# Patient Record
Sex: MALE | Race: WHITE | Employment: OTHER | URBAN - METROPOLITAN AREA
[De-identification: names, ages, dates, MRNs, and addresses within clinical notes are randomized per-mention and may not be internally consistent; named-entity substitution may affect disease eponyms.]

---

## 2017-08-02 ENCOUNTER — TELEPHONE (OUTPATIENT)
Dept: CARDIOLOGY CLINIC | Age: 67
End: 2017-08-02

## 2017-08-04 ENCOUNTER — OFFICE VISIT (OUTPATIENT)
Dept: CARDIOLOGY CLINIC | Age: 67
End: 2017-08-04

## 2017-08-04 VITALS
OXYGEN SATURATION: 92 % | BODY MASS INDEX: 42.66 KG/M2 | HEART RATE: 82 BPM | HEIGHT: 72 IN | WEIGHT: 315 LBS | DIASTOLIC BLOOD PRESSURE: 60 MMHG | SYSTOLIC BLOOD PRESSURE: 120 MMHG

## 2017-08-04 DIAGNOSIS — I50.9 CHF (CONGESTIVE HEART FAILURE), NYHA CLASS IV, UNSPECIFIED FAILURE CHRONICITY, UNSPECIFIED TYPE (HCC): ICD-10-CM

## 2017-08-04 DIAGNOSIS — I10 ESSENTIAL HYPERTENSION: Primary | ICD-10-CM

## 2017-08-04 DIAGNOSIS — Z95.1 S/P CABG X 2: ICD-10-CM

## 2017-08-04 DIAGNOSIS — E78.2 MIXED HYPERLIPIDEMIA: ICD-10-CM

## 2017-08-04 DIAGNOSIS — I25.10 CORONARY ARTERY DISEASE INVOLVING NATIVE CORONARY ARTERY OF NATIVE HEART WITHOUT ANGINA PECTORIS: ICD-10-CM

## 2017-08-04 PROCEDURE — 99214 OFFICE O/P EST MOD 30 MIN: CPT | Performed by: INTERNAL MEDICINE

## 2017-08-04 PROCEDURE — G8598 ASA/ANTIPLAT THER USED: HCPCS | Performed by: INTERNAL MEDICINE

## 2017-08-04 PROCEDURE — 3017F COLORECTAL CA SCREEN DOC REV: CPT | Performed by: INTERNAL MEDICINE

## 2017-08-04 PROCEDURE — G8427 DOCREV CUR MEDS BY ELIG CLIN: HCPCS | Performed by: INTERNAL MEDICINE

## 2017-08-04 PROCEDURE — 1036F TOBACCO NON-USER: CPT | Performed by: INTERNAL MEDICINE

## 2017-08-04 PROCEDURE — 4040F PNEUMOC VAC/ADMIN/RCVD: CPT | Performed by: INTERNAL MEDICINE

## 2017-08-04 PROCEDURE — G8417 CALC BMI ABV UP PARAM F/U: HCPCS | Performed by: INTERNAL MEDICINE

## 2017-08-04 PROCEDURE — 1123F ACP DISCUSS/DSCN MKR DOCD: CPT | Performed by: INTERNAL MEDICINE

## 2017-08-04 RX ORDER — PANTOPRAZOLE SODIUM 40 MG/1
40 GRANULE, DELAYED RELEASE ORAL
COMMUNITY

## 2017-08-04 RX ORDER — ACETAMINOPHEN 160 MG
TABLET,DISINTEGRATING ORAL
COMMUNITY
End: 2018-10-18

## 2017-08-04 RX ORDER — ASCORBIC ACID 500 MG
1000 TABLET ORAL DAILY
COMMUNITY

## 2017-08-04 RX ORDER — RAMIPRIL 1.25 MG/1
1.25 CAPSULE ORAL DAILY
COMMUNITY

## 2017-08-04 RX ORDER — ATORVASTATIN CALCIUM 40 MG/1
40 TABLET, FILM COATED ORAL DAILY
Qty: 30 TABLET | Refills: 3 | Status: SHIPPED | OUTPATIENT
Start: 2017-08-04

## 2017-08-04 RX ORDER — CLOPIDOGREL BISULFATE 75 MG/1
75 TABLET ORAL DAILY
COMMUNITY

## 2017-08-04 RX ORDER — PIOGLITAZONEHYDROCHLORIDE 30 MG/1
30 TABLET ORAL DAILY
COMMUNITY
End: 2019-05-09 | Stop reason: CLARIF

## 2017-08-22 ENCOUNTER — HOSPITAL ENCOUNTER (OUTPATIENT)
Dept: NON INVASIVE DIAGNOSTICS | Age: 67
Discharge: HOME | End: 2017-08-22
Attending: FAMILY MEDICINE | Admitting: FAMILY MEDICINE

## 2017-08-22 DIAGNOSIS — R06.09 OTHER DYSPNEA AND RESPIRATORY ABNORMALITY: ICD-10-CM

## 2017-08-22 DIAGNOSIS — R09.89 OTHER DYSPNEA AND RESPIRATORY ABNORMALITY: ICD-10-CM

## 2017-08-22 LAB
A/G RATIO: 1.1 (ref 1.1–2.2)
ALBUMIN SERPL-MCNC: 3.8 G/DL (ref 3.4–5)
ALP BLD-CCNC: 64 U/L (ref 40–129)
ALT SERPL-CCNC: 15 U/L (ref 10–40)
ANION GAP SERPL CALCULATED.3IONS-SCNC: 9 MMOL/L (ref 3–16)
AST SERPL-CCNC: 13 U/L (ref 15–37)
BILIRUB SERPL-MCNC: 0.4 MG/DL (ref 0–1)
BUN BLDV-MCNC: 17 MG/DL (ref 7–20)
CALCIUM SERPL-MCNC: 8.7 MG/DL (ref 8.3–10.6)
CHLORIDE BLD-SCNC: 94 MMOL/L (ref 99–110)
CHOLESTEROL, FASTING: 107 MG/DL (ref 0–199)
CO2: 37 MMOL/L (ref 21–32)
CREAT SERPL-MCNC: 1.2 MG/DL (ref 0.8–1.3)
GFR AFRICAN AMERICAN: >60
GFR NON-AFRICAN AMERICAN: >60
GLOBULIN: 3.6 G/DL
GLUCOSE FASTING: 143 MG/DL (ref 70–99)
HDLC SERPL-MCNC: 34 MG/DL (ref 40–60)
LDL CHOLESTEROL CALCULATED: 47 MG/DL
LV EF: 40 %
LVEF MODALITY: NORMAL
POTASSIUM SERPL-SCNC: 4.5 MMOL/L (ref 3.5–5.1)
SODIUM BLD-SCNC: 140 MMOL/L (ref 136–145)
TOTAL PROTEIN: 7.4 G/DL (ref 6.4–8.2)
TRIGLYCERIDE, FASTING: 129 MG/DL (ref 0–150)
VLDLC SERPL CALC-MCNC: 26 MG/DL

## 2017-08-23 ENCOUNTER — TELEPHONE (OUTPATIENT)
Dept: CARDIOLOGY CLINIC | Age: 67
End: 2017-08-23

## 2017-11-03 ENCOUNTER — OFFICE VISIT (OUTPATIENT)
Dept: CARDIOLOGY CLINIC | Age: 67
End: 2017-11-03

## 2017-11-03 VITALS
OXYGEN SATURATION: 90 % | SYSTOLIC BLOOD PRESSURE: 120 MMHG | WEIGHT: 315 LBS | DIASTOLIC BLOOD PRESSURE: 64 MMHG | HEART RATE: 82 BPM | HEIGHT: 72 IN | BODY MASS INDEX: 42.66 KG/M2

## 2017-11-03 DIAGNOSIS — Z95.1 S/P CABG X 2: ICD-10-CM

## 2017-11-03 DIAGNOSIS — E78.2 MIXED HYPERLIPIDEMIA: ICD-10-CM

## 2017-11-03 DIAGNOSIS — J44.9 CHRONIC OBSTRUCTIVE PULMONARY DISEASE, UNSPECIFIED COPD TYPE (HCC): ICD-10-CM

## 2017-11-03 DIAGNOSIS — G47.30 SLEEP APNEA, UNSPECIFIED TYPE: ICD-10-CM

## 2017-11-03 DIAGNOSIS — I10 ESSENTIAL HYPERTENSION: Primary | ICD-10-CM

## 2017-11-03 PROCEDURE — 4040F PNEUMOC VAC/ADMIN/RCVD: CPT | Performed by: INTERNAL MEDICINE

## 2017-11-03 PROCEDURE — G8598 ASA/ANTIPLAT THER USED: HCPCS | Performed by: INTERNAL MEDICINE

## 2017-11-03 PROCEDURE — 3023F SPIROM DOC REV: CPT | Performed by: INTERNAL MEDICINE

## 2017-11-03 PROCEDURE — 99214 OFFICE O/P EST MOD 30 MIN: CPT | Performed by: INTERNAL MEDICINE

## 2017-11-03 PROCEDURE — 1123F ACP DISCUSS/DSCN MKR DOCD: CPT | Performed by: INTERNAL MEDICINE

## 2017-11-03 PROCEDURE — G8926 SPIRO NO PERF OR DOC: HCPCS | Performed by: INTERNAL MEDICINE

## 2017-11-03 PROCEDURE — G8484 FLU IMMUNIZE NO ADMIN: HCPCS | Performed by: INTERNAL MEDICINE

## 2017-11-03 PROCEDURE — G8427 DOCREV CUR MEDS BY ELIG CLIN: HCPCS | Performed by: INTERNAL MEDICINE

## 2017-11-03 PROCEDURE — G8417 CALC BMI ABV UP PARAM F/U: HCPCS | Performed by: INTERNAL MEDICINE

## 2017-11-03 PROCEDURE — 1036F TOBACCO NON-USER: CPT | Performed by: INTERNAL MEDICINE

## 2017-11-03 PROCEDURE — 3017F COLORECTAL CA SCREEN DOC REV: CPT | Performed by: INTERNAL MEDICINE

## 2017-11-03 RX ORDER — TORSEMIDE 20 MG/1
20 TABLET ORAL 2 TIMES DAILY
Qty: 30 TABLET | Refills: 0 | Status: SHIPPED
Start: 2017-11-03 | End: 2017-11-03 | Stop reason: DRUGHIGH

## 2017-11-03 RX ORDER — HYDROCODONE BITARTRATE AND ACETAMINOPHEN 10; 325 MG/1; MG/1
1 TABLET ORAL EVERY 6 HOURS PRN
COMMUNITY
End: 2019-05-09 | Stop reason: CLARIF

## 2017-11-03 RX ORDER — TORSEMIDE 20 MG/1
20 TABLET ORAL 2 TIMES DAILY
Qty: 30 TABLET | Refills: 0 | Status: SHIPPED
Start: 2017-11-03 | End: 2021-03-05 | Stop reason: SDUPTHER
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
## 2017-11-03 NOTE — PATIENT INSTRUCTIONS
Plan:  1. Continue taking all other medications as prescribed  2. Lipid, CMP - Fasting - in 9 months   2.  Follow up with me in 9 months
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
## 2018-02-20 ENCOUNTER — HOSPITAL ENCOUNTER (OUTPATIENT)
Dept: OTHER | Age: 68
Discharge: HOME | End: 2018-02-20
Attending: INTERNAL MEDICINE | Admitting: INTERNAL MEDICINE

## 2018-02-20 ENCOUNTER — OFFICE VISIT (OUTPATIENT)
Dept: PULMONOLOGY | Age: 68
End: 2018-02-20

## 2018-02-20 VITALS
SYSTOLIC BLOOD PRESSURE: 102 MMHG | TEMPERATURE: 98 F | RESPIRATION RATE: 16 BRPM | OXYGEN SATURATION: 91 % | HEIGHT: 72 IN | DIASTOLIC BLOOD PRESSURE: 52 MMHG | HEART RATE: 117 BPM

## 2018-02-20 DIAGNOSIS — I27.81 COR PULMONALE (HCC): ICD-10-CM

## 2018-02-20 DIAGNOSIS — G47.10 HYPERSOMNIA: ICD-10-CM

## 2018-02-20 DIAGNOSIS — J44.9 CHRONIC OBSTRUCTIVE PULMONARY DISEASE, UNSPECIFIED COPD TYPE (HCC): ICD-10-CM

## 2018-02-20 DIAGNOSIS — G47.33 OSA (OBSTRUCTIVE SLEEP APNEA): ICD-10-CM

## 2018-02-20 DIAGNOSIS — J44.9 CHRONIC OBSTRUCTIVE PULMONARY DISEASE, UNSPECIFIED COPD TYPE (HCC): Primary | ICD-10-CM

## 2018-02-20 DIAGNOSIS — J96.11 CHRONIC HYPOXEMIC RESPIRATORY FAILURE (HCC): ICD-10-CM

## 2018-02-20 PROCEDURE — 3017F COLORECTAL CA SCREEN DOC REV: CPT | Performed by: INTERNAL MEDICINE

## 2018-02-20 PROCEDURE — 4040F PNEUMOC VAC/ADMIN/RCVD: CPT | Performed by: INTERNAL MEDICINE

## 2018-02-20 PROCEDURE — 99244 OFF/OP CNSLTJ NEW/EST MOD 40: CPT | Performed by: INTERNAL MEDICINE

## 2018-02-20 PROCEDURE — G8417 CALC BMI ABV UP PARAM F/U: HCPCS | Performed by: INTERNAL MEDICINE

## 2018-02-20 PROCEDURE — G8484 FLU IMMUNIZE NO ADMIN: HCPCS | Performed by: INTERNAL MEDICINE

## 2018-02-20 PROCEDURE — G8427 DOCREV CUR MEDS BY ELIG CLIN: HCPCS | Performed by: INTERNAL MEDICINE

## 2018-02-20 PROCEDURE — G8926 SPIRO NO PERF OR DOC: HCPCS | Performed by: INTERNAL MEDICINE

## 2018-02-20 PROCEDURE — 3023F SPIROM DOC REV: CPT | Performed by: INTERNAL MEDICINE

## 2018-02-20 RX ORDER — PREDNISONE 10 MG/1
10 TABLET ORAL DAILY
COMMUNITY
End: 2019-05-09 | Stop reason: CLARIF

## 2018-02-20 RX ORDER — KETOCONAZOLE 20 MG/G
CREAM TOPICAL DAILY
COMMUNITY
End: 2018-10-18

## 2018-02-20 RX ORDER — INSULIN GLARGINE 100 [IU]/ML
40 INJECTION, SOLUTION SUBCUTANEOUS NIGHTLY
COMMUNITY
End: 2018-04-12 | Stop reason: ALTCHOICE

## 2018-02-20 RX ORDER — IPRATROPIUM BROMIDE AND ALBUTEROL SULFATE 2.5; .5 MG/3ML; MG/3ML
1 SOLUTION RESPIRATORY (INHALATION) EVERY 4 HOURS
Qty: 360 ML | Refills: 5 | Status: SHIPPED | OUTPATIENT
Start: 2018-02-20 | End: 2019-05-09

## 2018-02-20 RX ORDER — ASPIRIN 325 MG
325 TABLET ORAL DAILY
COMMUNITY
End: 2020-02-17

## 2018-02-20 RX ORDER — LEVOFLOXACIN 500 MG/1
500 TABLET, FILM COATED ORAL DAILY
COMMUNITY
End: 2019-05-09 | Stop reason: CLARIF

## 2018-02-20 ASSESSMENT — SLEEP AND FATIGUE QUESTIONNAIRES
HOW LIKELY ARE YOU TO NOD OFF OR FALL ASLEEP WHEN YOU ARE A PASSENGER IN A CAR FOR AN HOUR WITHOUT A BREAK: 2
HOW LIKELY ARE YOU TO NOD OFF OR FALL ASLEEP WHILE SITTING QUIETLY AFTER LUNCH WITHOUT ALCOHOL: 3
HOW LIKELY ARE YOU TO NOD OFF OR FALL ASLEEP WHILE SITTING AND TALKING TO SOMEONE: 0
HOW LIKELY ARE YOU TO NOD OFF OR FALL ASLEEP WHILE SITTING INACTIVE IN A PUBLIC PLACE: 0
HOW LIKELY ARE YOU TO NOD OFF OR FALL ASLEEP WHILE LYING DOWN TO REST IN THE AFTERNOON WHEN CIRCUMSTANCES PERMIT: 3
HOW LIKELY ARE YOU TO NOD OFF OR FALL ASLEEP WHILE WATCHING TV: 3
ESS TOTAL SCORE: 11
NECK CIRCUMFERENCE (INCHES): 22.75
HOW LIKELY ARE YOU TO NOD OFF OR FALL ASLEEP IN A CAR, WHILE STOPPED FOR A FEW MINUTES IN TRAFFIC: 0
HOW LIKELY ARE YOU TO NOD OFF OR FALL ASLEEP WHILE SITTING AND READING: 0

## 2018-02-20 NOTE — COMMUNICATION BODY
Assessment:       · Severe COPD  · Chronic hypoxemic respiratory failure  · Cor pulmonale  · Severe CHRISTOFER on BiPAP with 5LPM    · Hypersomnia   · History of Tracheostomy 10/2013  · CHF (EF 40%) CAD post CABG  · 1-2 ppd for 45 years- quit 2010        Plan:       · 6MW- Patient not able to perform PFTs  · CXR PA and lateral   · Symbicort 160/4.5 2 puffs BID   · DuoNeb QID PRN   · D/C Brovana   · Obtain old records for prior PSG/PAP titration. · 3L O2 rest and 5L exertion. Advised to titrate O2 using her pulse oximeter- target O2 sat 90-92%. · Advised to use BiPAP 6-8 hrs at night and during naps. · Replacement of mask, tubing, head straps every 3-6 months or sooner if damaged. · Follow up BIPAP compliance and pressure adjustment if needed  · Sleep hygiene  · Avoid sedatives, alcohol and caffeinated drinks at bed time. · No driving motorized vehicles or operating heavy machinery while fatigue, drowsy or sleepy. · Weight loss is also recommended as a long-term intervention. · Complications of CHRISTOFER if not treated were discussed with patient patient to include systemic hypertension, pulmonary hypertension, cardiovascular morbidities, car accidents and all cause mortality.

## 2018-02-20 NOTE — PATIENT INSTRUCTIONS
Please keep all of your future appointments scheduled by [facility] Rene JUAREZ [facility] office. Sleep Hygiene. .. Tips for better sleep. .. Avoid naps. This will ensure you are sleepy at bedtime. If you have to take a nap, sleep less than 1 hour, before 3 pm.  Sleep only when sleepy; this reduces the time you are awake in bed. Regular exercise is recommended to help you deepen your sleep, but not within 4-6 hours of your bedtime. Timing of exercise is important, aim to exercise early in the morning or early afternoon. A light snack may help you fall asleep. Warm milk and foods high in the amino acid tryptophan, such as bananas, may help you to sleep  Be sure to avoid heavy, spicy or sugary foods 4-6 hours before bedtime and avoid at snack time. Stay away from stimulants such as caffeine and nicotine for at least 4-6 hours before bed. Stimulants can interfere with your ability to fall asleep. Caffeine is found in tea, cola, coffee, cocoa and chocolate and is best avoided at bedtime. Nicotine is found in tobacco products. Avoid alcohol 4-6 hours before bedtime. Alcohol has an immediate sleep-inducing effect, after a few hours when alcohol levels fall there is a stimulant or wake-up effect and will cause fragmented sleep. Sleep rituals are important. Give your body clues it is time to slow down and sleep. Examples include; yoga, deep breathing, listen to relaxing music, a hot bath or a few minutes of reading. Have a fixed bedtime and awakening time, Even on weekends! You will feel better keeping a regular sleep cycle, even if you are retired or not working. Get into your favorite sleep position. If not asleep in 30 minutes, get up and do something boring until you feel sleepy. Remember not to expose yourself to bright lights such as TV, phone or tablet screens. Only use your bed for sleeping. Do not use your bed as an office, workroom or recreation room. Use comfortable bedding. Uncomfortable bedding can prevent good sleep. Ensure your bedroom is quiet and comfortable. A cooler room along with enough blankets to stay warm is recommended. If your room is too noisy, try a white noise machine. If too bright, try black out shades or an eye mask. Dont take worries to bed. Leave worries about work, school etc. behind you when you go to bed. Some people find it helpful to assign a worry period in the evening or late afternoon to write down your worries and get them out of your system.

## 2018-02-20 NOTE — PROGRESS NOTES
[facility]                                                                  CHIEF COMPLAINT: COPD and CHRISTOFER    Consulting provider: Jack Nava M.D.    HPI:   Patient with history of severe COPD and obstructive sleep apnea followed by Dr. VERDE [facility].  Last seen in 2015. Admitted 3 week ago 1/23 for PNA and COPD AE. He was discharged on BiPAP. Before was using CPAP on and off, not very compliant. Now very compliant with his BiPAP, uses it 5-7 hrs. Mask an pressure better than the CPAP. Goes to bed 2 am and wakes up 10 am. Sleep onset 60 min. Takes 1 nap/day for about 30-60 min. Snoring, witnessed apnea and hypersomnia if not using BiPAP. ESS 11     Patient with COPD uses Symbicort. Dyspnea on exertion, able to walk only 50 feet. Dyspnea worse with exertion and better with resting. Associated with cough and no sputum. No hemoptysis. Has been on O2 >4 years. Quit smoking 2010, smoked  Up to 2 ppd for 45 years       Past Medical History:   Diagnosis Date    CAD (coronary artery disease)     MI    CHF (congestive heart failure) (HCC)     Chronic kidney disease     COPD (chronic obstructive pulmonary disease) ([address]     Diabetes mellitus ([address]     DVT of leg (deep venous thrombosis) ([address]     Hyperlipidemia     Hypertension     MRSA infection within last 3 months     Pneumonia     Unspecified diseases of blood and blood-forming organs        Past Surgical History:        Procedure Laterality Date    ANKLE FRACTURE SURGERY Left 2/22/15    ORIF; LEFT ANKLE OPEN REDUCTION INTERNAL FIXATION    BACK SURGERY      CARDIAC SURGERY      2 VESSEL CABG    CORONARY ARTERY BYPASS GRAFT      DIAGNOSTIC CARDIAC CATH LAB PROCEDURE      FRACTURE SURGERY      LEG SURGERY      VEIN REMOVED FROM L  LEG  PLACED IN RT LEG    [facility]  [facility]       Allergies:  has No Known Allergies.   Social History:    TOBACCO:   reports that he quit smoking about 7 years ago. His smoking use included Cigarettes. He has a 80.00 pack-year smoking history. He quit smokeless tobacco use about 5 years ago. His smokeless tobacco use included Chew. ETOH:   reports that he does not drink alcohol. Family History:       Problem Relation Age of Onset   [facility] Cancer Mother     Heart Failure Father     Emphysema Sister        Current Medications:    Current Outpatient Prescriptions:     ketoconazole (NIZORAL) 2 % cream, Apply topically daily Apply topically daily. , Disp: , Rfl:     mupirocin (BACTROBAN) 2 % nasal ointment, by Nasal route 2 times daily Take by Nasal route 2 times daily. , Disp: , Rfl:     betamethasone valerate (VALISONE) 0.1 % cream, Apply topically 2 times daily Apply topically 2 times daily. , Disp: , Rfl:     aspirin 325 MG tablet, Take 325 mg by mouth daily, Disp: , Rfl:     levofloxacin (LEVAQUIN) 500 MG tablet, Take 500 mg by mouth daily, Disp: , Rfl:     predniSONE (DELTASONE) 10 MG tablet, Take 10 mg by mouth daily, Disp: , Rfl:     insulin glargine (LANTUS) 100 UNIT/ML injection vial, Inject 40 Units into the skin nightly, Disp: , Rfl:     Budesonide-Formoterol Fumarate (SYMBICORT IN), Inhale into the lungs, Disp: , Rfl:     torsemide (DEMADEX) 20 MG tablet, Take 1 tablet by mouth 2 times daily Take 3 tablets twice daily, Disp: 30 tablet, Rfl: 0    Ascorbic Acid (VITAMIN C) 500 MG tablet, Take 1,000 mg by mouth daily, Disp: , Rfl:     Cholecalciferol (VITAMIN D3) 2000 units CAPS, Take by mouth, Disp: , Rfl:     clopidogrel (PLAVIX) 75 MG tablet, Take 75 mg by mouth daily, Disp: , Rfl:     pantoprazole sodium (PROTONIX) 40 MG PACK packet, Take 40 mg by mouth 2 times daily (before meals), Disp: , Rfl:     ramipril (ALTACE) 1.25 MG capsule, Take 1.25 mg by mouth daily, Disp: , Rfl:     atorvastatin (LIPITOR) 40 MG tablet, Take 1 tablet by mouth daily, Disp: 30 tablet, Rfl: 3    ferrous sulfate 325 (65 FE) MG tablet, Take 325 mg by mouth daily (with breakfast), Disp: , Rfl:     docusate sodium (COLACE) 100 MG capsule, Take 100 mg by mouth daily, Disp: , Rfl:     spironolactone (ALDACTONE) 25 MG tablet, Take 25 mg by mouth 2 times daily, Disp: , Rfl:     Arformoterol Tartrate (BROVANA IN), Inhale into the lungs 2 times daily. , Disp: , Rfl:     oxyCODONE HCl ER 10 MG CR tablet, Take 1 tablet by mouth every 12 hours. , Disp: 4 tablet, Rfl: 0    glimepiride (AMARYL) 2 MG tablet, Take 1 tablet by mouth 2 times daily (with meals). (Patient taking differently: Take 2 mg by mouth 2 times daily ), Disp: 1 tablet, Rfl: 0    ipratropium-albuterol (DUONEB) 0.5-2.5 (3) MG/3ML SOLN nebulizer solution, Inhale 3 mLs into the lungs every 4 hours. , Disp: 360 mL, Rfl: 3    metoprolol (TOPROL-XL) 25 MG XL tablet, Take 25 mg by mouth daily. , Disp: , Rfl:     amitriptyline (ELAVIL) 25 MG tablet, 25 mg nightly., Disp: , Rfl:     gabapentin (NEURONTIN) 800 MG tablet, 800 mg 3 times daily. , Disp: , Rfl:     tamsulosin (FLOMAX) 0.4 MG capsule, Take 0.4 mg by mouth daily. , Disp: , Rfl:     albuterol (PROVENTIL HFA;VENTOLIN HFA) 108 (90 BASE) MCG/ACT inhaler, Inhale 2 puffs into the lungs every 6 hours as needed for Wheezing., Disp: , Rfl:     potassium chloride SA (K-DUR;KLOR-CON) 10 MEQ tablet, Take 10 mEq by mouth 2 times daily , Disp: , Rfl:     aspirin 81 MG tablet, Take 81 mg by mouth daily, Disp: , Rfl:     HYDROcodone-acetaminophen (NORCO)  MG per tablet, Take 1 tablet by mouth every 6 hours as needed for Pain ., Disp: , Rfl:     pioglitazone (ACTOS) 30 MG tablet, Take 30 mg by mouth daily, Disp: , Rfl:     omeprazole (PRILOSEC) 20 MG capsule, Take 20 mg by mouth 2 tabs daily, Disp: , Rfl:     BUDESONIDE IN, Inhale into the lungs 2 times daily. , Disp: , Rfl:     triamcinolone (KENALOG) 0.025 % cream, Apply topically every 4 hours as needed.  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   Mild concentric left ventricular hypertrophy.   No regional wall motion abnormalities are seen.   Grade I diastolic dysfunction with normal filling pressure. Assessment:       · Severe COPD  · Chronic hypoxemic respiratory failure  · Cor pulmonale  · Severe CHRISTOFER on BiPAP with 5LPM    · Hypersomnia   · History of Tracheostomy 10/2013  · CHF (EF 40%) CAD post CABG  · 1-2 ppd for 45 years- quit 2010        Plan:       · 6MW- Patient not able to perform PFTs  · CXR PA and lateral   · Symbicort 160/4.5 2 puffs BID   · Trial of DuoNeb QID PRN   · D/C Brovana   · Obtain old records for prior PSG/PAP titration. · 3L O2 rest and 5L exertion. Advised to titrate O2 using her pulse oximeter- target O2 sat 90-92%. · Advised to use BiPAP 6-8 hrs at night and during naps. · Replacement of mask, tubing, head straps every 3-6 months or sooner if damaged. · Follow up BIPAP compliance and pressure adjustment if needed  · Sleep hygiene  · Avoid sedatives, alcohol and caffeinated drinks at bed time. · No driving motorized vehicles or operating heavy machinery while fatigue, drowsy or sleepy. · Weight loss is also recommended as a long-term intervention. · Complications of CHRISTOFER if not treated were discussed with patient patient to include systemic hypertension, pulmonary hypertension, cardiovascular morbidities, car accidents and all cause mortality.

## 2018-03-01 ENCOUNTER — HOSPITAL ENCOUNTER (OUTPATIENT)
Dept: PULMONOLOGY | Age: 68
Discharge: HOME | End: 2018-03-01
Attending: INTERNAL MEDICINE | Admitting: INTERNAL MEDICINE

## 2018-03-01 ENCOUNTER — OFFICE VISIT (OUTPATIENT)
Dept: PULMONOLOGY | Age: 68
End: 2018-03-01

## 2018-03-01 VITALS
SYSTOLIC BLOOD PRESSURE: 106 MMHG | HEIGHT: 72 IN | WEIGHT: 315 LBS | DIASTOLIC BLOOD PRESSURE: 72 MMHG | HEART RATE: 94 BPM | TEMPERATURE: 98.6 F | BODY MASS INDEX: 42.66 KG/M2 | OXYGEN SATURATION: 95 % | RESPIRATION RATE: 20 BRPM

## 2018-03-01 DIAGNOSIS — J44.9 CHRONIC OBSTRUCTIVE PULMONARY DISEASE (HCC): ICD-10-CM

## 2018-03-01 DIAGNOSIS — G47.33 OSA (OBSTRUCTIVE SLEEP APNEA): ICD-10-CM

## 2018-03-01 DIAGNOSIS — R93.89 ABNORMAL CXR: Primary | ICD-10-CM

## 2018-03-01 DIAGNOSIS — J96.11 CHRONIC HYPOXEMIC RESPIRATORY FAILURE (HCC): ICD-10-CM

## 2018-03-01 DIAGNOSIS — J44.9 STAGE 3 SEVERE COPD BY GOLD CLASSIFICATION (HCC): ICD-10-CM

## 2018-03-01 PROCEDURE — 3023F SPIROM DOC REV: CPT | Performed by: INTERNAL MEDICINE

## 2018-03-01 PROCEDURE — 4040F PNEUMOC VAC/ADMIN/RCVD: CPT | Performed by: INTERNAL MEDICINE

## 2018-03-01 PROCEDURE — 3017F COLORECTAL CA SCREEN DOC REV: CPT | Performed by: INTERNAL MEDICINE

## 2018-03-01 PROCEDURE — G8926 SPIRO NO PERF OR DOC: HCPCS | Performed by: INTERNAL MEDICINE

## 2018-03-01 PROCEDURE — 99214 OFFICE O/P EST MOD 30 MIN: CPT | Performed by: INTERNAL MEDICINE

## 2018-03-01 PROCEDURE — G8598 ASA/ANTIPLAT THER USED: HCPCS | Performed by: INTERNAL MEDICINE

## 2018-03-01 PROCEDURE — 1123F ACP DISCUSS/DSCN MKR DOCD: CPT | Performed by: INTERNAL MEDICINE

## 2018-03-01 PROCEDURE — 1036F TOBACCO NON-USER: CPT | Performed by: INTERNAL MEDICINE

## 2018-03-01 PROCEDURE — G8417 CALC BMI ABV UP PARAM F/U: HCPCS | Performed by: INTERNAL MEDICINE

## 2018-03-01 PROCEDURE — G8427 DOCREV CUR MEDS BY ELIG CLIN: HCPCS | Performed by: INTERNAL MEDICINE

## 2018-03-01 PROCEDURE — G8484 FLU IMMUNIZE NO ADMIN: HCPCS | Performed by: INTERNAL MEDICINE

## 2018-03-01 RX ORDER — BUDESONIDE AND FORMOTEROL FUMARATE DIHYDRATE 160; 4.5 UG/1; UG/1
2 AEROSOL RESPIRATORY (INHALATION) 2 TIMES DAILY
Qty: 1 INHALER | Refills: 5 | Status: SHIPPED | OUTPATIENT
Start: 2018-03-01 | End: 2018-10-18 | Stop reason: SDUPTHER

## 2018-03-01 ASSESSMENT — SLEEP AND FATIGUE QUESTIONNAIRES
NECK CIRCUMFERENCE (INCHES): 22.25
HOW LIKELY ARE YOU TO NOD OFF OR FALL ASLEEP WHILE SITTING INACTIVE IN A PUBLIC PLACE: 1
ESS TOTAL SCORE: 14
HOW LIKELY ARE YOU TO NOD OFF OR FALL ASLEEP WHILE WATCHING TV: 3
HOW LIKELY ARE YOU TO NOD OFF OR FALL ASLEEP WHILE LYING DOWN TO REST IN THE AFTERNOON WHEN CIRCUMSTANCES PERMIT: 3
HOW LIKELY ARE YOU TO NOD OFF OR FALL ASLEEP WHILE SITTING QUIETLY AFTER LUNCH WITHOUT ALCOHOL: 2
HOW LIKELY ARE YOU TO NOD OFF OR FALL ASLEEP WHEN YOU ARE A PASSENGER IN A CAR FOR AN HOUR WITHOUT A BREAK: 1
HOW LIKELY ARE YOU TO NOD OFF OR FALL ASLEEP IN A CAR, WHILE STOPPED FOR A FEW MINUTES IN TRAFFIC: 1
HOW LIKELY ARE YOU TO NOD OFF OR FALL ASLEEP WHILE SITTING AND READING: 2
HOW LIKELY ARE YOU TO NOD OFF OR FALL ASLEEP WHILE SITTING AND TALKING TO SOMEONE: 1

## 2018-03-01 NOTE — PROGRESS NOTES
[facility] Pulmonary, Critical Care and Sleep Specialists                                                                  CHIEF COMPLAINT: COPD and CHRISTOFER        HPI:   6MW walked only 100 feet and require O2 3LPM. Occasional cough with clear sputum. Patient is compliant with inhaled bronchodilators and O2. Overall feels better from last week. Uses BiPAP every night. Goes to bed 2 am and wakes 10 am. Sleep onset of 60 min. 1 nap/day for about 60 min. From prior visit:   Patient with history of severe COPD and obstructive sleep apnea followed by Dr. VERDE [facility].  Last seen in 2015. Admitted 3 week ago 1/23 for PNA and COPD AE. He was discharged on BiPAP. Before was using CPAP on and off, not very compliant. Now very compliant with his BiPAP, uses it 5-7 hrs. Mask an pressure better than the CPAP. Goes to bed 2 am and wakes up 10 am. Sleep onset 60 min. Takes 1 nap/day for about 30-60 min. Snoring, witnessed apnea and hypersomnia if not using BiPAP. ESS 11     Patient with COPD uses Symbicort. Dyspnea on exertion, able to walk only 50 feet. Dyspnea worse with exertion and better with resting. Associated with cough and no sputum. No hemoptysis. Has been on O2 >4 years.  Quit smoking 2010, smoked  Up to 2 ppd for 45 years       Past Medical History:   Diagnosis Date    CAD (coronary artery disease)     MI    [facility]est[facility]     Chronic kidney disease     COPD [facility]ch[facility] obstructive pulmonary disease) [facility]     Diabetes mellitus ([facility] [address]     DVT of leg (deep venous thrombosis) ([facility]     Hyperlipidemia     Hypertension     MRSA infection within last 3 months     Pneumonia     Unspecified diseases of blood and blood-forming organs        Past Surgical History:        Procedure Laterality Date    ANKLE FRACTURE SURGERY Left 2/22/15    ORIF; LEFT ANKLE OPEN REDUCTION INTERNAL FIXATION    BACK SURGERY      CARDIAC SURGERY      2 VESSEL CABG    CORONARY ARTERY BYPASS GRAFT      DIAGNOSTIC CARDIAC CATH LAB PROCEDURE      FRACTURE SURGERY      LEG SURGERY      VEIN REMOVED FROM L  LEG  PLACED IN RT LEG    LEG SURGERY  2016 [address]       Allergies:  has No Known Allergies. Social History:    TOBACCO:   reports that he quit smoking about 7 years ago. His smoking use included Cigarettes. He has a 80.00 pack-year smoking history. He quit smokeless tobacco use about 5 years ago. His smokeless tobacco use included Chew. ETOH:   reports that he does not drink alcohol. Family History:       Problem Relation Age of Onset   Argelia Maldonado Cancer Mother     Heart Failure Father     Emphysema Sister        Current Medications:    Current Outpatient Prescriptions:     ketoconazole (NIZORAL) 2 % cream, Apply topically daily Apply topically daily. , Disp: , Rfl:     mupirocin (BACTROBAN) 2 % nasal ointment, by Nasal route 2 times daily Take by Nasal route 2 times daily. , Disp: , Rfl:     betamethasone valerate (VALISONE) 0.1 % cream, Apply topically 2 times daily Apply topically 2 times daily. , Disp: , Rfl:     aspirin 325 MG tablet, Take 325 mg by mouth daily, Disp: , Rfl:     levofloxacin (LEVAQUIN) 500 MG tablet, Take 500 mg by mouth daily, Disp: , Rfl:     predniSONE (DELTASONE) 10 MG tablet, Take 10 mg by mouth daily, Disp: , Rfl:     insulin glargine (LANTUS) 100 UNIT/ML injection vial, Inject 40 Units into the skin nightly, Disp: , Rfl:     ipratropium-albuterol (DUONEB) 0.5-2.5 (3) MG/3ML SOLN nebulizer solution, Inhale 3 mLs into the lungs every 4 hours, Disp: 360 mL, Rfl: 5    aspirin 81 MG tablet, Take 81 mg by mouth daily, Disp: , Rfl:     HYDROcodone-acetaminophen (NORCO)  MG per tablet, Take 1 tablet by mouth every 6 hours as needed for Pain ., Disp: , Rfl:     Budesonide-Formoterol Fumarate (SYMBICORT IN), Inhale into the lungs, Disp: , Rfl:     torsemide (DEMADEX) 20 MG tablet, Take 1 tablet by mouth 2 times daily Take 3 tablets twice daily, Disp: 30 tablet, Rfl: 0    Ascorbic Acid (VITAMIN C) 500 MG tablet, Take 1,000 mg by mouth daily, Disp: , Rfl:     Cholecalciferol (VITAMIN D3) 2000 units CAPS, Take by mouth, Disp: , Rfl:     clopidogrel (PLAVIX) 75 MG tablet, Take 75 mg by mouth daily, Disp: , Rfl:     pantoprazole sodium (PROTONIX) 40 MG PACK packet, Take 40 mg by mouth 2 times daily (before meals), Disp: , Rfl:     pioglitazone (ACTOS) 30 MG tablet, Take 30 mg by mouth daily, Disp: , Rfl:     ramipril (ALTACE) 1.25 MG capsule, Take 1.25 mg by mouth daily, Disp: , Rfl:     atorvastatin (LIPITOR) 40 MG tablet, Take 1 tablet by mouth daily, Disp: 30 tablet, Rfl: 3    ferrous sulfate 325 (65 FE) MG tablet, Take 325 mg by mouth daily (with breakfast), Disp: , Rfl:     docusate sodium (COLACE) 100 MG capsule, Take 100 mg by mouth daily, Disp: , Rfl:     omeprazole (PRILOSEC) 20 MG capsule, Take 20 mg by mouth 2 tabs daily, Disp: , Rfl:     spironolactone (ALDACTONE) 25 MG tablet, Take 25 mg by mouth 2 times daily, Disp: , Rfl:     BUDESONIDE IN, Inhale into the lungs 2 times daily. , Disp: , Rfl:     triamcinolone (KENALOG) 0.025 % cream, Apply topically every 4 hours as needed. Apply Topically, Disp: , Rfl:     oxyCODONE HCl ER 10 MG CR tablet, Take 1 tablet by mouth every 12 hours. , Disp: 4 tablet, Rfl: 0    glimepiride (AMARYL) 2 MG tablet, Take 1 tablet by mouth 2 times daily (with meals). (Patient taking differently: Take 2 mg by mouth 2 times daily ), Disp: 1 tablet, Rfl: 0    metoprolol (TOPROL-XL) 25 MG XL tablet, Take 25 mg by mouth daily. , Disp: , Rfl:     amitriptyline (ELAVIL) 25 MG tablet, 25 mg nightly., Disp: , Rfl:     gabapentin (NEURONTIN) 800 MG tablet, 800 mg 3 times daily. , Disp: , Rfl:     tamsulosin (FLOMAX) 0.4 MG capsule, Take 0.4 mg by mouth daily. , Disp: , Rfl:     albuterol (PROVENTIL HFA;VENTOLIN HFA) 108 (90 BASE) MCG/ACT inhaler, Inhale 2 puffs into the lungs every 6 hours as needed for resolving PNA. We'll document resolution  · History of Tracheostomy 10/2013  · CHF (EF 40%) CAD post CABG  · 1-2 ppd for 45 years- quit 2010        Plan:       · CXR PA and lateral in 6 weeks   · Symbicort 160/4.5 2 puffs BID   · DuoNeb QID PRN   · Obtain old records for sleep studies  · 3L O2 rest and 5L exertion. Advised to titrate O2 using her pulse oximeter- target O2 sat 90-92%. · Advised to use BiPAP 6-8 hrs at night and during naps. · Replacement of mask, tubing, head straps every 3-6 months or sooner if damaged. · Follow up BIPAP compliance and pressure adjustment if needed  · Sleep hygiene  · Avoid sedatives, alcohol and caffeinated drinks at bed time. · No driving motorized vehicles or operating heavy machinery while fatigue, drowsy or sleepy.

## 2018-03-03 NOTE — PROCEDURES
[address]                  [address] [address]                                PULMONARY FUNCTION    PATIENT NAME: Allison Patel                :        1950  MED REC NO:   [id number]                          ROOM:  ACCOUNT NO:   [de-identified]                          ADMIT DATE: 2018  PROVIDER:     Pamela Schulz MD    DATE OF PROCEDURE:  2018    ATTENDING:  Zaynab Zavala MD    INDICATION:  COPD. FINDINGS:  Six minute walk test was conducted per [employer] protocol. The patient ambulated 100 feet. The patient stopped after two minutes due  to back pain and leg pain and shortness of breath. The patient 's resting  SpO2 is 87% on room air. Saturated to 3 L per minute while walking during  the shortened test.  The resting heart rate was 92 and the heart rate  maximum was 103.         Moshe Calles MD    D: 2018 16:09:28       T: 2018 0:50:08     [email or web address]  Job#: [id number]     Doc#: [id number]    CC:

## 2018-04-12 ENCOUNTER — OFFICE VISIT (OUTPATIENT)
Dept: PULMONOLOGY | Age: 68
End: 2018-04-12

## 2018-04-12 ENCOUNTER — HOSPITAL ENCOUNTER (OUTPATIENT)
Dept: OTHER | Age: 68
Discharge: HOME | End: 2018-04-12
Attending: INTERNAL MEDICINE | Admitting: INTERNAL MEDICINE

## 2018-04-12 ENCOUNTER — TELEPHONE (OUTPATIENT)
Dept: PULMONOLOGY | Age: 68
End: 2018-04-12

## 2018-04-12 VITALS
TEMPERATURE: 98.7 F | HEIGHT: 72 IN | DIASTOLIC BLOOD PRESSURE: 76 MMHG | SYSTOLIC BLOOD PRESSURE: 118 MMHG | OXYGEN SATURATION: 96 % | HEART RATE: 84 BPM | BODY MASS INDEX: 42.66 KG/M2 | WEIGHT: 315 LBS | RESPIRATION RATE: 20 BRPM

## 2018-04-12 DIAGNOSIS — R93.89 ABNORMAL CXR: ICD-10-CM

## 2018-04-12 DIAGNOSIS — J44.9 COPD, SEVERE (HCC): Primary | ICD-10-CM

## 2018-04-12 DIAGNOSIS — J96.11 CHRONIC HYPOXEMIC RESPIRATORY FAILURE (HCC): ICD-10-CM

## 2018-04-12 DIAGNOSIS — G47.33 SEVERE OBSTRUCTIVE SLEEP APNEA: ICD-10-CM

## 2018-04-12 DIAGNOSIS — J44.9 STAGE 3 SEVERE COPD BY GOLD CLASSIFICATION (HCC): Primary | ICD-10-CM

## 2018-04-12 DIAGNOSIS — R93.89 ABNORMAL CXR (CHEST X-RAY): ICD-10-CM

## 2018-04-12 PROCEDURE — 3023F SPIROM DOC REV: CPT | Performed by: INTERNAL MEDICINE

## 2018-04-12 PROCEDURE — G8427 DOCREV CUR MEDS BY ELIG CLIN: HCPCS | Performed by: INTERNAL MEDICINE

## 2018-04-12 PROCEDURE — G8417 CALC BMI ABV UP PARAM F/U: HCPCS | Performed by: INTERNAL MEDICINE

## 2018-04-12 PROCEDURE — G8598 ASA/ANTIPLAT THER USED: HCPCS | Performed by: INTERNAL MEDICINE

## 2018-04-12 PROCEDURE — G8926 SPIRO NO PERF OR DOC: HCPCS | Performed by: INTERNAL MEDICINE

## 2018-04-12 PROCEDURE — 1123F ACP DISCUSS/DSCN MKR DOCD: CPT | Performed by: INTERNAL MEDICINE

## 2018-04-12 PROCEDURE — 4040F PNEUMOC VAC/ADMIN/RCVD: CPT | Performed by: INTERNAL MEDICINE

## 2018-04-12 PROCEDURE — 1036F TOBACCO NON-USER: CPT | Performed by: INTERNAL MEDICINE

## 2018-04-12 PROCEDURE — 3017F COLORECTAL CA SCREEN DOC REV: CPT | Performed by: INTERNAL MEDICINE

## 2018-04-12 PROCEDURE — 99214 OFFICE O/P EST MOD 30 MIN: CPT | Performed by: INTERNAL MEDICINE

## 2018-04-12 ASSESSMENT — SLEEP AND FATIGUE QUESTIONNAIRES
HOW LIKELY ARE YOU TO NOD OFF OR FALL ASLEEP WHEN YOU ARE A PASSENGER IN A CAR FOR AN HOUR WITHOUT A BREAK: 1
NECK CIRCUMFERENCE (INCHES): 22.25
HOW LIKELY ARE YOU TO NOD OFF OR FALL ASLEEP WHILE SITTING AND TALKING TO SOMEONE: 1
HOW LIKELY ARE YOU TO NOD OFF OR FALL ASLEEP WHILE LYING DOWN TO REST IN THE AFTERNOON WHEN CIRCUMSTANCES PERMIT: 2
HOW LIKELY ARE YOU TO NOD OFF OR FALL ASLEEP WHILE WATCHING TV: 2
HOW LIKELY ARE YOU TO NOD OFF OR FALL ASLEEP WHILE SITTING QUIETLY AFTER LUNCH WITHOUT ALCOHOL: 1
ESS TOTAL SCORE: 12
HOW LIKELY ARE YOU TO NOD OFF OR FALL ASLEEP IN A CAR, WHILE STOPPED FOR A FEW MINUTES IN TRAFFIC: 2
HOW LIKELY ARE YOU TO NOD OFF OR FALL ASLEEP WHILE SITTING AND READING: 2
HOW LIKELY ARE YOU TO NOD OFF OR FALL ASLEEP WHILE SITTING INACTIVE IN A PUBLIC PLACE: 1

## 2018-05-05 ENCOUNTER — HOSPITAL ENCOUNTER (OUTPATIENT)
Dept: CT IMAGING | Facility: MEDICAL CENTER | Age: 68
Discharge: HOME | End: 2018-05-05

## 2018-05-05 DIAGNOSIS — R93.89 ABNORMAL CXR: ICD-10-CM

## 2018-10-18 ENCOUNTER — OFFICE VISIT (OUTPATIENT)
Dept: PULMONOLOGY | Age: 68
End: 2018-10-18
Payer: COMMERCIAL

## 2018-10-18 VITALS
OXYGEN SATURATION: 94 % | SYSTOLIC BLOOD PRESSURE: 124 MMHG | HEART RATE: 93 BPM | DIASTOLIC BLOOD PRESSURE: 68 MMHG | WEIGHT: 315 LBS | TEMPERATURE: 98.4 F | BODY MASS INDEX: 42.66 KG/M2 | HEIGHT: 72 IN | RESPIRATION RATE: 18 BRPM

## 2018-10-18 DIAGNOSIS — Z87.891 PERSONAL HISTORY OF TOBACCO USE: ICD-10-CM

## 2018-10-18 DIAGNOSIS — I27.81 COR PULMONALE (HCC): ICD-10-CM

## 2018-10-18 DIAGNOSIS — J44.1 COPD WITH ACUTE EXACERBATION (HCC): Primary | ICD-10-CM

## 2018-10-18 DIAGNOSIS — J96.11 CHRONIC HYPOXEMIC RESPIRATORY FAILURE (HCC): ICD-10-CM

## 2018-10-18 DIAGNOSIS — G47.33 SEVERE OBSTRUCTIVE SLEEP APNEA: ICD-10-CM

## 2018-10-18 PROCEDURE — G8427 DOCREV CUR MEDS BY ELIG CLIN: HCPCS | Performed by: INTERNAL MEDICINE

## 2018-10-18 PROCEDURE — 1036F TOBACCO NON-USER: CPT | Performed by: INTERNAL MEDICINE

## 2018-10-18 PROCEDURE — G8484 FLU IMMUNIZE NO ADMIN: HCPCS | Performed by: INTERNAL MEDICINE

## 2018-10-18 PROCEDURE — G8417 CALC BMI ABV UP PARAM F/U: HCPCS | Performed by: INTERNAL MEDICINE

## 2018-10-18 PROCEDURE — G8598 ASA/ANTIPLAT THER USED: HCPCS | Performed by: INTERNAL MEDICINE

## 2018-10-18 PROCEDURE — 3017F COLORECTAL CA SCREEN DOC REV: CPT | Performed by: INTERNAL MEDICINE

## 2018-10-18 PROCEDURE — 1101F PT FALLS ASSESS-DOCD LE1/YR: CPT | Performed by: INTERNAL MEDICINE

## 2018-10-18 PROCEDURE — G8926 SPIRO NO PERF OR DOC: HCPCS | Performed by: INTERNAL MEDICINE

## 2018-10-18 PROCEDURE — G0296 VISIT TO DETERM LDCT ELIG: HCPCS | Performed by: INTERNAL MEDICINE

## 2018-10-18 PROCEDURE — 3023F SPIROM DOC REV: CPT | Performed by: INTERNAL MEDICINE

## 2018-10-18 PROCEDURE — 1123F ACP DISCUSS/DSCN MKR DOCD: CPT | Performed by: INTERNAL MEDICINE

## 2018-10-18 PROCEDURE — 99214 OFFICE O/P EST MOD 30 MIN: CPT | Performed by: INTERNAL MEDICINE

## 2018-10-18 PROCEDURE — 4040F PNEUMOC VAC/ADMIN/RCVD: CPT | Performed by: INTERNAL MEDICINE

## 2018-10-18 RX ORDER — BUDESONIDE AND FORMOTEROL FUMARATE DIHYDRATE 160; 4.5 UG/1; UG/1
2 AEROSOL RESPIRATORY (INHALATION) 2 TIMES DAILY
Qty: 1 INHALER | Refills: 6 | Status: SHIPPED | OUTPATIENT
Start: 2018-10-18 | End: 2019-01-17 | Stop reason: SDUPTHER

## 2018-10-18 ASSESSMENT — SLEEP AND FATIGUE QUESTIONNAIRES
HOW LIKELY ARE YOU TO NOD OFF OR FALL ASLEEP WHILE SITTING INACTIVE IN A PUBLIC PLACE: 2
HOW LIKELY ARE YOU TO NOD OFF OR FALL ASLEEP WHILE SITTING QUIETLY AFTER LUNCH WITHOUT ALCOHOL: 2
HOW LIKELY ARE YOU TO NOD OFF OR FALL ASLEEP WHEN YOU ARE A PASSENGER IN A CAR FOR AN HOUR WITHOUT A BREAK: 0
HOW LIKELY ARE YOU TO NOD OFF OR FALL ASLEEP WHILE SITTING AND READING: 2
HOW LIKELY ARE YOU TO NOD OFF OR FALL ASLEEP WHILE SITTING AND TALKING TO SOMEONE: 0
HOW LIKELY ARE YOU TO NOD OFF OR FALL ASLEEP IN A CAR, WHILE STOPPED FOR A FEW MINUTES IN TRAFFIC: 0
HOW LIKELY ARE YOU TO NOD OFF OR FALL ASLEEP WHILE LYING DOWN TO REST IN THE AFTERNOON WHEN CIRCUMSTANCES PERMIT: 0
HOW LIKELY ARE YOU TO NOD OFF OR FALL ASLEEP WHILE WATCHING TV: 2
NECK CIRCUMFERENCE (INCHES): 22.25
ESS TOTAL SCORE: 8

## 2018-10-18 NOTE — PATIENT INSTRUCTIONS
better sleep. .. Avoid naps. This will ensure you are sleepy at bedtime. If you have to take a nap, sleep less than 1 hour, before 3 pm.  Sleep only when sleepy; this reduces the time you are awake in bed. Regular exercise is recommended to help you deepen your sleep, but not within 4-6 hours of your bedtime. Timing of exercise is important, aim to exercise early in the morning or early afternoon. A light snack may help you fall asleep. Warm milk and foods high in the amino acid tryptophan, such as bananas, may help you to sleep  Be sure to avoid heavy, spicy or sugary foods 4-6 hours before bedtime and avoid at snack time. Stay away from stimulants such as caffeine and nicotine for at least 4-6 hours before bed. Stimulants can interfere with your ability to fall asleep. Caffeine is found in tea, cola, coffee, cocoa and chocolate and is best avoided at bedtime. Nicotine is found in tobacco products. Avoid alcohol 4-6 hours before bedtime. Alcohol has an immediate sleep-inducing effect, after a few hours when alcohol levels fall there is a stimulant or wake-up effect and will cause fragmented sleep. Sleep rituals are important. Give your body clues it is time to slow down and sleep. Examples include; yoga, deep breathing, listen to relaxing music, a hot bath or a few minutes of reading. Have a fixed bedtime and awakening time, Even on weekends! You will feel better keeping a regular sleep cycle, even if you are retired or not working. Get into your favorite sleep position. If not asleep in 30 minutes, get up and do something boring until you feel sleepy. Remember not to expose yourself to bright lights such as TV, phone or tablet screens. Only use your bed for sleeping. Do not use your bed as an office, workroom or recreation room. Use comfortable bedding. Uncomfortable bedding can prevent good sleep. Ensure your bedroom is quiet and comfortable.  A cooler room along with enough blankets to stay warm

## 2018-11-21 ENCOUNTER — HOSPITAL ENCOUNTER (OUTPATIENT)
Age: 68
Discharge: HOME | End: 2018-11-21
Payer: COMMERCIAL

## 2018-11-21 DIAGNOSIS — I48.19 PERSISTENT ATRIAL FIBRILLATION (HCC): ICD-10-CM

## 2018-11-21 DIAGNOSIS — E78.2 MIXED HYPERLIPIDEMIA: ICD-10-CM

## 2018-11-21 DIAGNOSIS — I25.10 CORONARY ARTERY DISEASE INVOLVING NATIVE CORONARY ARTERY OF NATIVE HEART WITHOUT ANGINA PECTORIS: Primary | ICD-10-CM

## 2018-11-21 DIAGNOSIS — I10 ESSENTIAL HYPERTENSION: ICD-10-CM

## 2018-11-21 DIAGNOSIS — I25.10 CORONARY ARTERY DISEASE INVOLVING NATIVE CORONARY ARTERY OF NATIVE HEART WITHOUT ANGINA PECTORIS: ICD-10-CM

## 2018-11-21 LAB
A/G RATIO: 1.5 (ref 1.1–2.2)
ALBUMIN SERPL-MCNC: 4.3 G/DL (ref 3.4–5)
ALP BLD-CCNC: 59 U/L (ref 40–129)
ALT SERPL-CCNC: 21 U/L (ref 10–40)
ANION GAP SERPL CALCULATED.3IONS-SCNC: 13 MMOL/L (ref 3–16)
AST SERPL-CCNC: 16 U/L (ref 15–37)
BILIRUB SERPL-MCNC: <0.2 MG/DL (ref 0–1)
BUN BLDV-MCNC: 33 MG/DL (ref 7–20)
CALCIUM SERPL-MCNC: 9.3 MG/DL (ref 8.3–10.6)
CHLORIDE BLD-SCNC: 90 MMOL/L (ref 99–110)
CHOLESTEROL, FASTING: 110 MG/DL (ref 0–199)
CO2: 31 MMOL/L (ref 21–32)
CREAT SERPL-MCNC: 1.4 MG/DL (ref 0.8–1.3)
GFR AFRICAN AMERICAN: >60
GFR NON-AFRICAN AMERICAN: 51
GLOBULIN: 2.9 G/DL
GLUCOSE FASTING: 165 MG/DL (ref 70–99)
HDLC SERPL-MCNC: 29 MG/DL (ref 40–60)
LDL CHOLESTEROL CALCULATED: 44 MG/DL
POTASSIUM SERPL-SCNC: 4.6 MMOL/L (ref 3.5–5.1)
SODIUM BLD-SCNC: 134 MMOL/L (ref 136–145)
TOTAL PROTEIN: 7.2 G/DL (ref 6.4–8.2)
TRIGLYCERIDE, FASTING: 187 MG/DL (ref 0–150)
VLDLC SERPL CALC-MCNC: 37 MG/DL

## 2018-11-21 PROCEDURE — 80061 LIPID PANEL: CPT

## 2018-11-21 PROCEDURE — 36415 COLL VENOUS BLD VENIPUNCTURE: CPT

## 2018-11-21 PROCEDURE — 80053 COMPREHEN METABOLIC PANEL: CPT

## 2018-11-26 ENCOUNTER — TELEPHONE (OUTPATIENT)
Dept: CARDIOLOGY CLINIC | Age: 68
End: 2018-11-26

## 2018-11-26 NOTE — TELEPHONE ENCOUNTER
Created telephone encounter. Per Pt HIPAA from can leave results on machine. LMOM relaying message per [facility] regarding labs results. Pt to call the office with any concerns.

## 2018-11-26 NOTE — TELEPHONE ENCOUNTER
----- Message from Jorge Chavez MD sent at 11/22/2018  7:26 AM EST -----  Overall stable labs. Good lipid labs overall as well.  CPM

## 2018-12-03 NOTE — PROGRESS NOTES
11/21/2018    CL 90 (L) 11/21/2018    CO2 31 11/21/2018     Assessment:     1. CAD (coronary artery disease and mild ischemic cardiomyopathy:  Most recent LHC/RHC 10/3/14 showed severe native CAD with patent LIMA-LAD and SVG-PDA with no need for PCI. Mean PAP=37mmHg with wedge=25mmHg. Most recent ECHO 8/22/17 showed heart muscle strength is mildly weakened, EF 40%. Appears stable overall and there are no concerning symptoms for angina currently. Continue ramipril and toprol XL and aldactone for LV dysfunction. 2. Hyperlipidemia: I personally reviewed most recent labs from 11/21/18  showing well controlled lipids LDL 44 except chronically, mildly low HDL 29 and will continue current medical regimen. 3. HTN (hypertension): Well controlled and will continue current medical regimen. 4. S/P CABG x 2:  See #1 above. 5. LE edema: Will continue demadex 40mg BID. Overall baseline. Plan:  1. Continue taking all other medications as prescribed  2. Medications reviewed and refilled as warranted  3. Risk factor modification was discussed including the importance and management of lipids, BP, diet, exercise, and weight loss  4. Follow up with me in 12 months   5. Keep feet elevated while siting and educated on low salt diet  6. Check bmp in 2 weeks given mildly worsened BUN/Cr=33/1.4  (17/1.2 in August 2017). Cost of prescription medications and patient compliance have been reviewed with patient. All questions answered. Thank you for allowing me to participate in the care of this individual.    Joon Bolaños.  Venita Woodall M.D., [facility]

## 2018-12-04 ENCOUNTER — OFFICE VISIT (OUTPATIENT)
Dept: CARDIOLOGY CLINIC | Age: 68
End: 2018-12-04
Payer: COMMERCIAL

## 2018-12-04 VITALS
SYSTOLIC BLOOD PRESSURE: 134 MMHG | HEART RATE: 96 BPM | WEIGHT: 315 LBS | BODY MASS INDEX: 42.66 KG/M2 | OXYGEN SATURATION: 93 % | HEIGHT: 72 IN | DIASTOLIC BLOOD PRESSURE: 78 MMHG

## 2018-12-04 DIAGNOSIS — I25.5 ISCHEMIC CARDIOMYOPATHY: ICD-10-CM

## 2018-12-04 DIAGNOSIS — I25.10 CORONARY ARTERY DISEASE INVOLVING NATIVE CORONARY ARTERY OF NATIVE HEART WITHOUT ANGINA PECTORIS: ICD-10-CM

## 2018-12-04 DIAGNOSIS — I10 ESSENTIAL HYPERTENSION: ICD-10-CM

## 2018-12-04 DIAGNOSIS — E78.2 MIXED HYPERLIPIDEMIA: ICD-10-CM

## 2018-12-04 DIAGNOSIS — I48.19 PERSISTENT ATRIAL FIBRILLATION (HCC): Primary | ICD-10-CM

## 2018-12-04 PROCEDURE — 3017F COLORECTAL CA SCREEN DOC REV: CPT | Performed by: INTERNAL MEDICINE

## 2018-12-04 PROCEDURE — 1036F TOBACCO NON-USER: CPT | Performed by: INTERNAL MEDICINE

## 2018-12-04 PROCEDURE — G8417 CALC BMI ABV UP PARAM F/U: HCPCS | Performed by: INTERNAL MEDICINE

## 2018-12-04 PROCEDURE — 99214 OFFICE O/P EST MOD 30 MIN: CPT | Performed by: INTERNAL MEDICINE

## 2018-12-04 PROCEDURE — 4040F PNEUMOC VAC/ADMIN/RCVD: CPT | Performed by: INTERNAL MEDICINE

## 2018-12-04 PROCEDURE — G8484 FLU IMMUNIZE NO ADMIN: HCPCS | Performed by: INTERNAL MEDICINE

## 2018-12-04 PROCEDURE — 1123F ACP DISCUSS/DSCN MKR DOCD: CPT | Performed by: INTERNAL MEDICINE

## 2018-12-04 PROCEDURE — G8598 ASA/ANTIPLAT THER USED: HCPCS | Performed by: INTERNAL MEDICINE

## 2018-12-04 PROCEDURE — G8427 DOCREV CUR MEDS BY ELIG CLIN: HCPCS | Performed by: INTERNAL MEDICINE

## 2018-12-04 PROCEDURE — 1101F PT FALLS ASSESS-DOCD LE1/YR: CPT | Performed by: INTERNAL MEDICINE

## 2018-12-04 NOTE — COMMUNICATION BODY
11/21/2018    CL 90 (L) 11/21/2018    CO2 31 11/21/2018     Assessment:     1. CAD (coronary artery disease and mild ischemic cardiomyopathy:  Most recent LHC/RHC 10/3/14 showed severe native CAD with patent LIMA-LAD and SVG-PDA with no need for PCI. Mean PAP=37mmHg with wedge=25mmHg. Most recent ECHO 8/22/17 showed heart muscle strength is mildly weakened, EF 40%. Appears stable overall and there are no concerning symptoms for angina currently. Continue ramipril and toprol XL and aldactone for LV dysfunction. 2. Hyperlipidemia: I personally reviewed most recent labs from 11/21/18  showing well controlled lipids LDL 44 except chronically, mildly low HDL 29 and will continue current medical regimen. 3. HTN (hypertension): Well controlled and will continue current medical regimen. 4. S/P CABG x 2:  See #1 above. 5. LE edema: Will continue demadex 40mg BID. Overall baseline. Plan:  1. Continue taking all other medications as prescribed  2. Medications reviewed and refilled as warranted  3. Risk factor modification was discussed including the importance and management of lipids, BP, diet, exercise, and weight loss  4. Follow up with me in 12 months   5. Keep feet elevated while siting and educated on low salt diet  6. Check bmp in 2 weeks given mildly worsened BUN/Cr=33/1.4  (17/1.2 in August 2017). Cost of prescription medications and patient compliance have been reviewed with patient. All questions answered. Thank you for allowing me to participate in the care of this individual.    [place] .  Alfredo Wisdom M.D., [facility]

## 2018-12-15 LAB
AGE TIME: 66 YRS
ANION GAP SERPL CALCULATED.3IONS-SCNC: 9 MMOL/L
BASIC METABOLIC PANEL: ABNORMAL
BUN BLDV-MCNC: 25 MG/DL
CALCIUM SERPL-MCNC: 9.1 MG/DL
CHLORIDE BLD-SCNC: 97 MMOL/L
CO2: 34.1 MMOL/L
CREAT SERPL-MCNC: 1.65 MG/DL
GFR AFRICAN AMERICAN: 51 ML/MIN
GFR NON-AFRICAN AMERICAN: 42 ML/MIN
GLUCOSE BLD-MCNC: 151 MG/DL
POTASSIUM SERPL-SCNC: 5 MMOL/L
SODIUM BLD-SCNC: 140 MMOL/L

## 2018-12-17 ENCOUNTER — TELEPHONE (OUTPATIENT)
Dept: CARDIOLOGY CLINIC | Age: 68
End: 2018-12-17

## 2018-12-17 DIAGNOSIS — N28.9 RENAL INSUFFICIENCY: ICD-10-CM

## 2018-12-17 DIAGNOSIS — N17.9 ACUTE RENAL FAILURE, UNSPECIFIED ACUTE RENAL FAILURE TYPE (HCC): Primary | ICD-10-CM

## 2019-01-17 ENCOUNTER — OFFICE VISIT (OUTPATIENT)
Dept: PULMONOLOGY | Age: 69
End: 2019-01-17
Payer: COMMERCIAL

## 2019-01-17 VITALS
OXYGEN SATURATION: 91 % | RESPIRATION RATE: 20 BRPM | HEIGHT: 72 IN | BODY MASS INDEX: 42.66 KG/M2 | WEIGHT: 315 LBS | SYSTOLIC BLOOD PRESSURE: 117 MMHG | HEART RATE: 97 BPM | DIASTOLIC BLOOD PRESSURE: 56 MMHG | TEMPERATURE: 98.2 F

## 2019-01-17 DIAGNOSIS — G47.33 SEVERE OBSTRUCTIVE SLEEP APNEA: ICD-10-CM

## 2019-01-17 DIAGNOSIS — J44.9 COPD, SEVERE (HCC): Primary | ICD-10-CM

## 2019-01-17 DIAGNOSIS — I27.81 COR PULMONALE (HCC): ICD-10-CM

## 2019-01-17 DIAGNOSIS — J96.11 CHRONIC HYPOXEMIC RESPIRATORY FAILURE (HCC): ICD-10-CM

## 2019-01-17 PROCEDURE — 1101F PT FALLS ASSESS-DOCD LE1/YR: CPT | Performed by: INTERNAL MEDICINE

## 2019-01-17 PROCEDURE — 3023F SPIROM DOC REV: CPT | Performed by: INTERNAL MEDICINE

## 2019-01-17 PROCEDURE — G8482 FLU IMMUNIZE ORDER/ADMIN: HCPCS | Performed by: INTERNAL MEDICINE

## 2019-01-17 PROCEDURE — G8598 ASA/ANTIPLAT THER USED: HCPCS | Performed by: INTERNAL MEDICINE

## 2019-01-17 PROCEDURE — 1036F TOBACCO NON-USER: CPT | Performed by: INTERNAL MEDICINE

## 2019-01-17 PROCEDURE — 4040F PNEUMOC VAC/ADMIN/RCVD: CPT | Performed by: INTERNAL MEDICINE

## 2019-01-17 PROCEDURE — 3017F COLORECTAL CA SCREEN DOC REV: CPT | Performed by: INTERNAL MEDICINE

## 2019-01-17 PROCEDURE — 1123F ACP DISCUSS/DSCN MKR DOCD: CPT | Performed by: INTERNAL MEDICINE

## 2019-01-17 PROCEDURE — G8427 DOCREV CUR MEDS BY ELIG CLIN: HCPCS | Performed by: INTERNAL MEDICINE

## 2019-01-17 PROCEDURE — G8417 CALC BMI ABV UP PARAM F/U: HCPCS | Performed by: INTERNAL MEDICINE

## 2019-01-17 PROCEDURE — G8926 SPIRO NO PERF OR DOC: HCPCS | Performed by: INTERNAL MEDICINE

## 2019-01-17 PROCEDURE — 99214 OFFICE O/P EST MOD 30 MIN: CPT | Performed by: INTERNAL MEDICINE

## 2019-01-17 RX ORDER — DOXYCYCLINE HYCLATE 100 MG
100 TABLET ORAL 2 TIMES DAILY
Qty: 10 TABLET | Refills: 0 | Status: SHIPPED | OUTPATIENT
Start: 2019-01-17 | End: 2019-01-22

## 2019-01-17 RX ORDER — POLYETHYLENE GLYCOL 3350 17 G/17G
17 POWDER, FOR SOLUTION ORAL DAILY PRN
COMMUNITY

## 2019-01-17 RX ORDER — BUDESONIDE AND FORMOTEROL FUMARATE DIHYDRATE 160; 4.5 UG/1; UG/1
2 AEROSOL RESPIRATORY (INHALATION) 2 TIMES DAILY
Qty: 1 INHALER | Refills: 6 | Status: SHIPPED | OUTPATIENT
Start: 2019-01-17 | End: 2019-05-09 | Stop reason: SDUPTHER

## 2019-01-17 RX ORDER — TADALAFIL 5 MG/1
5 TABLET ORAL PRN
COMMUNITY
End: 2020-02-17 | Stop reason: ALTCHOICE

## 2019-01-17 RX ORDER — PREDNISONE 10 MG/1
TABLET ORAL
Qty: 30 TABLET | Refills: 0 | Status: SHIPPED | OUTPATIENT
Start: 2019-01-17 | End: 2019-01-27

## 2019-01-17 RX ORDER — ACETAMINOPHEN 160 MG
TABLET,DISINTEGRATING ORAL DAILY
COMMUNITY

## 2019-01-17 RX ORDER — AMITRIPTYLINE HYDROCHLORIDE 25 MG/1
25 TABLET, FILM COATED ORAL NIGHTLY
COMMUNITY
End: 2019-05-09 | Stop reason: CLARIF

## 2019-01-17 ASSESSMENT — SLEEP AND FATIGUE QUESTIONNAIRES
HOW LIKELY ARE YOU TO NOD OFF OR FALL ASLEEP WHILE SITTING AND TALKING TO SOMEONE: 0
HOW LIKELY ARE YOU TO NOD OFF OR FALL ASLEEP IN A CAR, WHILE STOPPED FOR A FEW MINUTES IN TRAFFIC: 0
NECK CIRCUMFERENCE (INCHES): 21
ESS TOTAL SCORE: 11
HOW LIKELY ARE YOU TO NOD OFF OR FALL ASLEEP WHILE LYING DOWN TO REST IN THE AFTERNOON WHEN CIRCUMSTANCES PERMIT: 3
HOW LIKELY ARE YOU TO NOD OFF OR FALL ASLEEP WHILE SITTING AND READING: 2
HOW LIKELY ARE YOU TO NOD OFF OR FALL ASLEEP WHEN YOU ARE A PASSENGER IN A CAR FOR AN HOUR WITHOUT A BREAK: 0
HOW LIKELY ARE YOU TO NOD OFF OR FALL ASLEEP WHILE SITTING INACTIVE IN A PUBLIC PLACE: 2
HOW LIKELY ARE YOU TO NOD OFF OR FALL ASLEEP WHILE WATCHING TV: 2
HOW LIKELY ARE YOU TO NOD OFF OR FALL ASLEEP WHILE SITTING QUIETLY AFTER LUNCH WITHOUT ALCOHOL: 2

## 2019-05-09 ENCOUNTER — OFFICE VISIT (OUTPATIENT)
Dept: PULMONOLOGY | Age: 69
End: 2019-05-09
Payer: COMMERCIAL

## 2019-05-09 ENCOUNTER — TELEPHONE (OUTPATIENT)
Dept: PULMONOLOGY | Age: 69
End: 2019-05-09

## 2019-05-09 ENCOUNTER — HOSPITAL ENCOUNTER (OUTPATIENT)
Dept: CT IMAGING | Age: 69
Discharge: HOME | End: 2019-05-09
Payer: COMMERCIAL

## 2019-05-09 VITALS
RESPIRATION RATE: 16 BRPM | DIASTOLIC BLOOD PRESSURE: 80 MMHG | WEIGHT: 315 LBS | OXYGEN SATURATION: 95 % | TEMPERATURE: 98.2 F | SYSTOLIC BLOOD PRESSURE: 134 MMHG | HEART RATE: 92 BPM | HEIGHT: 72 IN | BODY MASS INDEX: 42.66 KG/M2

## 2019-05-09 DIAGNOSIS — G47.33 SEVERE OBSTRUCTIVE SLEEP APNEA: ICD-10-CM

## 2019-05-09 DIAGNOSIS — I27.81 COR PULMONALE (HCC): ICD-10-CM

## 2019-05-09 DIAGNOSIS — Z87.891 PERSONAL HISTORY OF TOBACCO USE: ICD-10-CM

## 2019-05-09 DIAGNOSIS — J44.9 COPD, SEVERE (HCC): Primary | ICD-10-CM

## 2019-05-09 DIAGNOSIS — J96.11 CHRONIC HYPOXEMIC RESPIRATORY FAILURE (HCC): ICD-10-CM

## 2019-05-09 DIAGNOSIS — Z87.891 PERSONAL HISTORY OF TOBACCO USE: Primary | ICD-10-CM

## 2019-05-09 DIAGNOSIS — J44.9 CHRONIC OBSTRUCTIVE PULMONARY DISEASE, UNSPECIFIED COPD TYPE (HCC): ICD-10-CM

## 2019-05-09 PROCEDURE — G8417 CALC BMI ABV UP PARAM F/U: HCPCS | Performed by: INTERNAL MEDICINE

## 2019-05-09 PROCEDURE — G0297 LDCT FOR LUNG CA SCREEN: HCPCS

## 2019-05-09 PROCEDURE — 1123F ACP DISCUSS/DSCN MKR DOCD: CPT | Performed by: INTERNAL MEDICINE

## 2019-05-09 PROCEDURE — 4040F PNEUMOC VAC/ADMIN/RCVD: CPT | Performed by: INTERNAL MEDICINE

## 2019-05-09 PROCEDURE — G8926 SPIRO NO PERF OR DOC: HCPCS | Performed by: INTERNAL MEDICINE

## 2019-05-09 PROCEDURE — 3023F SPIROM DOC REV: CPT | Performed by: INTERNAL MEDICINE

## 2019-05-09 PROCEDURE — 3017F COLORECTAL CA SCREEN DOC REV: CPT | Performed by: INTERNAL MEDICINE

## 2019-05-09 PROCEDURE — G8598 ASA/ANTIPLAT THER USED: HCPCS | Performed by: INTERNAL MEDICINE

## 2019-05-09 PROCEDURE — 99214 OFFICE O/P EST MOD 30 MIN: CPT | Performed by: INTERNAL MEDICINE

## 2019-05-09 PROCEDURE — G8427 DOCREV CUR MEDS BY ELIG CLIN: HCPCS | Performed by: INTERNAL MEDICINE

## 2019-05-09 PROCEDURE — 1036F TOBACCO NON-USER: CPT | Performed by: INTERNAL MEDICINE

## 2019-05-09 RX ORDER — IPRATROPIUM BROMIDE AND ALBUTEROL SULFATE 2.5; .5 MG/3ML; MG/3ML
1 SOLUTION RESPIRATORY (INHALATION) EVERY 6 HOURS PRN
Qty: 120 VIAL | Refills: 6 | Status: SHIPPED | OUTPATIENT
Start: 2019-05-09 | End: 2021-12-16 | Stop reason: SDUPTHER

## 2019-05-09 RX ORDER — CALCITRIOL 0.25 UG/1
0.25 CAPSULE, LIQUID FILLED ORAL DAILY
COMMUNITY

## 2019-05-09 RX ORDER — BUDESONIDE AND FORMOTEROL FUMARATE DIHYDRATE 160; 4.5 UG/1; UG/1
2 AEROSOL RESPIRATORY (INHALATION) 2 TIMES DAILY
Qty: 1 INHALER | Refills: 6 | Status: SHIPPED | OUTPATIENT
Start: 2019-05-09 | End: 2020-05-28 | Stop reason: SDUPTHER

## 2019-05-09 ASSESSMENT — SLEEP AND FATIGUE QUESTIONNAIRES
NECK CIRCUMFERENCE (INCHES): 19.5
HOW LIKELY ARE YOU TO NOD OFF OR FALL ASLEEP WHILE SITTING INACTIVE IN A PUBLIC PLACE: 0
HOW LIKELY ARE YOU TO NOD OFF OR FALL ASLEEP WHILE SITTING QUIETLY AFTER LUNCH WITHOUT ALCOHOL: 2
HOW LIKELY ARE YOU TO NOD OFF OR FALL ASLEEP WHEN YOU ARE A PASSENGER IN A CAR FOR AN HOUR WITHOUT A BREAK: 0
ESS TOTAL SCORE: 9
HOW LIKELY ARE YOU TO NOD OFF OR FALL ASLEEP WHILE WATCHING TV: 2
HOW LIKELY ARE YOU TO NOD OFF OR FALL ASLEEP WHILE SITTING AND TALKING TO SOMEONE: 0
HOW LIKELY ARE YOU TO NOD OFF OR FALL ASLEEP WHILE LYING DOWN TO REST IN THE AFTERNOON WHEN CIRCUMSTANCES PERMIT: 3
HOW LIKELY ARE YOU TO NOD OFF OR FALL ASLEEP IN A CAR, WHILE STOPPED FOR A FEW MINUTES IN TRAFFIC: 0
HOW LIKELY ARE YOU TO NOD OFF OR FALL ASLEEP WHILE SITTING AND READING: 2

## 2019-05-09 NOTE — PROGRESS NOTES
[facility] Pulmonary, Critical Care and Sleep Specialists                                                                  CHIEF COMPLAINT: follow up CT COPD and CHRISTOFER         HPI:   CT chest reviewed by me and noted below. Results were dicussed with patient and multiple good questions were answered. Breathing is the same   No cough   No hemoptysis   Uses Neb once a week   No smoking   Patient is compliant with inhaled bronchodilators and O2. Uses BiPAP every night for 5-6 hrs. Mask and pressure are okay. No nodding off when driving. Goes to bed 2 am and wakes 10 am. Sleep onset of 30 min. 1 nap/day for about 1 hr.  ESS 9. Past Medical History:   Diagnosis Date    CAD (coronary artery disease)     MI    CHF (congestive heart failure) (HCC)     Chronic kidney disease     COPD (chronic obstructive pulmonary disease) ([address]     Diabetes mellitus ([address]     DVT of leg (deep venous thrombosis) ([address]     Hyperlipidemia     Hypertension     MRSA infection within last 3 months     Pneumonia     Unspecified diseases of blood and blood-forming organs        Past Surgical History:        Procedure Laterality Date    ANKLE FRACTURE SURGERY Left 2/22/15    ORIF; LEFT ANKLE OPEN REDUCTION INTERNAL FIXATION    BACK SURGERY      CARDIAC SURGERY      2 VESSEL CABG    CORONARY ARTERY BYPASS GRAFT      DIAGNOSTIC CARDIAC CATH LAB PROCEDURE      FRACTURE SURGERY      LEG SURGERY      VEIN REMOVED FROM L  LEG  PLACED IN RT LEG    LEG SURGERY  2016    [facility]       Allergies:  has No Known Allergies. Social History:    TOBACCO:   reports that he quit smoking about 9 years ago. His smoking use included cigarettes. He has a 80.00 pack-year smoking history. He quit smokeless tobacco use about 7 years ago. His smokeless tobacco use included chew. ETOH:   reports that he does not drink alcohol.       Family History:       Problem Relation Age of Onset    Cancer Mother  Heart Failure Father     Emphysema Sister        Current Medications:    Current Outpatient Medications:     vitamin A 76854 units capsule, Take 10,000 Units by mouth daily, Disp: , Rfl:     calcitRIOL (ROCALTROL) 0.25 MCG capsule, Take 0.25 mcg by mouth daily, Disp: , Rfl:     tadalafil (CIALIS) 5 MG tablet, Take 5 mg by mouth as needed for Erectile Dysfunction, Disp: , Rfl:     CALCIUM-MAGNESIUM-ZINC PO, Take by mouth daily, Disp: , Rfl:     Cholecalciferol (VITAMIN D3) 2000 units CAPS, Take by mouth daily, Disp: , Rfl:     Docusate Calcium (STOOL SOFTENER PO), Take by mouth daily, Disp: , Rfl:     metFORMIN (GLUCOPHAGE) 500 MG tablet, Take 500 mg by mouth 3 times daily, Disp: , Rfl:     polyethylene glycol (GLYCOLAX) packet, Take 17 g by mouth daily as needed for Constipation, Disp: , Rfl:     Dulaglutide (TRULICITY) 1.5 BA/0.6JT SOPN, Inject into the skin once a week, Disp: , Rfl:     budesonide-formoterol (SYMBICORT) 160-4.5 MCG/ACT AERO, Inhale 2 puffs into the lungs 2 times daily, Disp: 1 Inhaler, Rfl: 6    insulin glargine (BASAGLAR KWIKPEN) 100 UNIT/ML injection pen, Basaglar KwikPen U-100 Insulin 100 unit/mL (3 mL) subcutaneous, Disp: , Rfl:     betamethasone valerate (VALISONE) 0.1 % cream, Apply topically 2 times daily Apply topically 2 times daily. , Disp: , Rfl:     aspirin 325 MG tablet, Take 325 mg by mouth daily, Disp: , Rfl:     ipratropium-albuterol (DUONEB) 0.5-2.5 (3) MG/3ML SOLN nebulizer solution, Inhale 3 mLs into the lungs every 4 hours, Disp: 360 mL, Rfl: 5    torsemide (DEMADEX) 20 MG tablet, Take 1 tablet by mouth 2 times daily Take 3 tablets twice daily (Patient taking differently: Take 40 mg by mouth 2 times daily Take 2 tablets twice daily), Disp: 30 tablet, Rfl: 0    Ascorbic Acid (VITAMIN C) 500 MG tablet, Take 1,000 mg by mouth daily, Disp: , Rfl:     clopidogrel (PLAVIX) 75 MG tablet, Take 75 mg by mouth daily, Disp: , Rfl:     pantoprazole sodium (PROTONIX) 40 MG PACK packet, Take 40 mg by mouth 2 times daily (before meals), Disp: , Rfl:     ramipril (ALTACE) 1.25 MG capsule, Take 1.25 mg by mouth daily, Disp: , Rfl:     atorvastatin (LIPITOR) 40 MG tablet, Take 1 tablet by mouth daily, Disp: 30 tablet, Rfl: 3    ferrous sulfate 325 (65 FE) MG tablet, Take 325 mg by mouth daily (with breakfast), Disp: , Rfl:     oxyCODONE HCl ER 10 MG CR tablet, Take 1 tablet by mouth every 12 hours. , Disp: 4 tablet, Rfl: 0    metoprolol (TOPROL-XL) 25 MG XL tablet, Take 25 mg by mouth daily. , Disp: , Rfl:     albuterol (PROVENTIL HFA;VENTOLIN HFA) 108 (90 BASE) MCG/ACT inhaler, Inhale 2 puffs into the lungs every 6 hours as needed for Wheezing., Disp: , Rfl:     potassium chloride SA (K-DUR;KLOR-CON) 10 MEQ tablet, Take 10 mEq by mouth 2 times daily , Disp: , Rfl:     BUDESONIDE IN, Inhale into the lungs 2 times daily. , Disp: , Rfl:       Objective:   PHYSICAL EXAM:    Blood pressure 134/80, pulse 92, temperature 98.2 °F (36.8 °C), temperature source Oral, resp. rate 16, height 6' (1.829 m), weight (!) 334 lb (151.5 kg), SpO2 95 %.' on 3LPM   Gen: No distress. Obese. BMI of 45.3  Eyes: PERRL. No sclera icterus. No conjunctival injection. ENT: No discharge. Pharynx clear. Mallampati class IV. Neck: Trachea midline. No obvious mass. Neck circumference 19.5\"  Resp: No accessory muscle use. No crackles. No wheezes. No rhonchi. No dullness on percussion. Good air entry. CV: Regular rate. Regular rhythm. No murmur or rub. LE edema. GI: Non-tender. Non-distended. No hernia. Skin: Warm and dry. No nodule on exposed extremities. Lymph: No cervical LAD. No supraclavicular LAD. M/S: No cyanosis. No joint deformity. No clubbing. Neuro: Awake. Alert. Moves all four extremities. Psych: Oriented x 3. No anxiety. DATA reviewed by me:     CT chest 5/5/18 Bilateral linear opacity-atelectasis versus scarring  No suspicious pulmonary nodules.   Continued active surveillance

## 2019-09-30 ENCOUNTER — TELEPHONE (OUTPATIENT)
Dept: CARDIOLOGY CLINIC | Age: 69
End: 2019-09-30

## 2019-10-10 ENCOUNTER — TELEPHONE (OUTPATIENT)
Dept: CARDIOLOGY CLINIC | Age: 69
End: 2019-10-10

## 2019-11-08 ENCOUNTER — TELEPHONE (OUTPATIENT)
Dept: PULMONOLOGY | Age: 69
End: 2019-11-08

## 2019-12-31 ENCOUNTER — TELEPHONE (OUTPATIENT)
Dept: CARDIOLOGY CLINIC | Age: 69
End: 2019-12-31

## 2019-12-31 NOTE — TELEPHONE ENCOUNTER
Rowena Seymour called to give her phone # in case pt would like to reach out to her and the [facility] CHF team. She has been unsuccessful at reaching the pt. [phone]

## 2020-02-17 ENCOUNTER — OFFICE VISIT (OUTPATIENT)
Dept: CARDIOLOGY CLINIC | Age: 70
End: 2020-02-17
Payer: COMMERCIAL

## 2020-02-17 VITALS
SYSTOLIC BLOOD PRESSURE: 108 MMHG | HEIGHT: 72 IN | BODY MASS INDEX: 42.66 KG/M2 | OXYGEN SATURATION: 96 % | DIASTOLIC BLOOD PRESSURE: 56 MMHG | WEIGHT: 315 LBS | HEART RATE: 86 BPM

## 2020-02-17 PROCEDURE — 4040F PNEUMOC VAC/ADMIN/RCVD: CPT | Performed by: INTERNAL MEDICINE

## 2020-02-17 PROCEDURE — G8417 CALC BMI ABV UP PARAM F/U: HCPCS | Performed by: INTERNAL MEDICINE

## 2020-02-17 PROCEDURE — 99214 OFFICE O/P EST MOD 30 MIN: CPT | Performed by: INTERNAL MEDICINE

## 2020-02-17 PROCEDURE — 1036F TOBACCO NON-USER: CPT | Performed by: INTERNAL MEDICINE

## 2020-02-17 PROCEDURE — G8427 DOCREV CUR MEDS BY ELIG CLIN: HCPCS | Performed by: INTERNAL MEDICINE

## 2020-02-17 PROCEDURE — 1123F ACP DISCUSS/DSCN MKR DOCD: CPT | Performed by: INTERNAL MEDICINE

## 2020-02-17 PROCEDURE — 3017F COLORECTAL CA SCREEN DOC REV: CPT | Performed by: INTERNAL MEDICINE

## 2020-02-17 PROCEDURE — G8484 FLU IMMUNIZE NO ADMIN: HCPCS | Performed by: INTERNAL MEDICINE

## 2020-02-17 RX ORDER — EMPAGLIFLOZIN 25 MG/1
TABLET, FILM COATED ORAL
COMMUNITY
Start: 2019-12-20 | End: 2021-03-05

## 2020-02-17 RX ORDER — ASPIRIN 81 MG/1
81 TABLET ORAL DAILY
Qty: 90 TABLET | Refills: 1
Start: 2020-02-17 | End: 2021-03-05 | Stop reason: DRUGHIGH

## 2020-02-17 RX ORDER — AMITRIPTYLINE HYDROCHLORIDE 25 MG/1
25 TABLET, FILM COATED ORAL NIGHTLY
COMMUNITY

## 2020-02-17 RX ORDER — METOLAZONE 5 MG/1
5 TABLET ORAL PRN
Status: SHIPPED | COMMUNITY
Start: 2020-02-17 | End: 2021-03-05

## 2020-02-17 RX ORDER — METOLAZONE 5 MG/1
5 TABLET ORAL
COMMUNITY
End: 2020-02-17 | Stop reason: DRUGHIGH

## 2020-02-17 RX ORDER — ALLOPURINOL 300 MG/1
TABLET ORAL
COMMUNITY

## 2020-02-17 NOTE — LETTER
[address] [place]  [address] [id number]  [address] [address]  Phone: [phone]  Fax: [phone]    Jannis Cranker, MD        February 17, 2020    [address]  [address] [id number]      To whom it may concern: It is my medical opinion that Madalynn Grade is considered an intermediate risk clinically for a lower risk surgery. Proceed as planned and ok to hold plavix for least amount of time per physician performing procedure. If you have any questions or concerns, please don't hesitate to call.     Sincerely,        Jannis Cranker, MD

## 2020-02-17 NOTE — LETTER
family history includes Cancer in his mother; Emphysema in his sister; Heart Failure in his father.      Home Medications:  Outpatient Encounter Medications as of 2/17/2020   Medication Sig Dispense Refill    metOLazone (ZAROXOLYN) 5 MG tablet Take 5 mg by mouth twice a week      amitriptyline (ELAVIL) 25 MG tablet Take 25 mg by mouth nightly      allopurinol (ZYLOPRIM) 300 MG tablet allopurinol 300 mg tablet      JARDIANCE 25 MG tablet TAKE 1 TABLET BY MOUTH ONCE DAILY      calcitRIOL (ROCALTROL) 0.25 MCG capsule Take 0.25 mcg by mouth daily      ipratropium-albuterol (DUONEB) 0.5-2.5 (3) MG/3ML SOLN nebulizer solution Inhale 3 mLs into the lungs every 6 hours as needed for Shortness of Breath DX COPD J44.9 120 vial 6    budesonide-formoterol (SYMBICORT) 160-4.5 MCG/ACT AERO Inhale 2 puffs into the lungs 2 times daily 1 Inhaler 6    Cholecalciferol (VITAMIN D3) 2000 units CAPS Take by mouth daily      Docusate Calcium (STOOL SOFTENER PO) Take by mouth daily      metFORMIN (GLUCOPHAGE) 500 MG tablet Take 500 mg by mouth 3 times daily      polyethylene glycol (GLYCOLAX) packet Take 17 g by mouth daily as needed for Constipation      insulin glargine (BASAGLAR KWIKPEN) 100 UNIT/ML injection pen Basaglar KwikPen U-100 Insulin 100 unit/mL (3 mL) subcutaneous      aspirin 325 MG tablet Take 325 mg by mouth daily      torsemide (DEMADEX) 20 MG tablet Take 1 tablet by mouth 2 times daily Take 3 tablets twice daily (Patient taking differently: Take 40 mg by mouth 2 times daily Take 2 tablets twice daily) 30 tablet 0    Ascorbic Acid (VITAMIN C) 500 MG tablet Take 1,000 mg by mouth daily      clopidogrel (PLAVIX) 75 MG tablet Take 75 mg by mouth daily      pantoprazole sodium (PROTONIX) 40 MG PACK packet Take 40 mg by mouth 2 times daily (before meals)      ramipril (ALTACE) 1.25 MG capsule Take 1.25 mg by mouth daily      atorvastatin (LIPITOR) 40 MG tablet Take 1 tablet by mouth daily 30 tablet 3  ferrous sulfate 325 (65 FE) MG tablet Take 325 mg by mouth daily (with breakfast)      oxyCODONE HCl ER 10 MG CR tablet Take 1 tablet by mouth every 12 hours. 4 tablet 0    metoprolol (TOPROL-XL) 25 MG XL tablet Take 25 mg by mouth daily.  albuterol (PROVENTIL HFA;VENTOLIN HFA) 108 (90 BASE) MCG/ACT inhaler Inhale 2 puffs into the lungs every 6 hours as needed for Wheezing.  potassium chloride SA (K-DUR;KLOR-CON) 10 MEQ tablet Take 10 mEq by mouth 2 times daily       [DISCONTINUED] vitamin A 86653 units capsule Take 10,000 Units by mouth daily      [DISCONTINUED] tadalafil (CIALIS) 5 MG tablet Take 5 mg by mouth as needed for Erectile Dysfunction      [DISCONTINUED] CALCIUM-MAGNESIUM-ZINC PO Take by mouth daily      [DISCONTINUED] Dulaglutide (TRULICITY) 1.5 EP/2.1QI SOPN Inject into the skin once a week      [DISCONTINUED] betamethasone valerate (VALISONE) 0.1 % cream Apply topically 2 times daily Apply topically 2 times daily.  [DISCONTINUED] BUDESONIDE IN Inhale into the lungs 2 times daily. No facility-administered encounter medications on file as of 2/17/2020. Allergies:  Patient has no known allergies. Review of Systems:   · Constitutional: there has been no unanticipated weight loss. There's been no change in energy level, sleep pattern, or activity level. · Eyes: No visual changes or diplopia. No scleral icterus. · ENT: No Headaches, hearing loss or vertigo. No mouth sores or sore throat. · Cardiovascular: Reviewed in HPI  · Respiratory: No cough or wheezing, no sputum production. No hematemesis. · Gastrointestinal: No abdominal pain, appetite loss, blood in stools. No change in bowel or bladder habits. · Genitourinary: No dysuria, trouble voiding, or hematuria. · Musculoskeletal:  No gait disturbance, weakness or joint complaints. · Integumentary: No rash or pruritis.   · Neurological: No headache, diplopia, change in muscle strength, numbness or tingling. No change in gait, balance, coordination, mood, affect, memory, mentation, behavior. · Psychiatric: No anxiety, no depression. · Endocrine: No malaise, fatigue or temperature intolerance. No excessive thirst, fluid intake, or urination. No tremor. · Hematologic/Lymphatic: No abnormal bruising or bleeding, blood clots or swollen lymph nodes. · Allergic/Immunologic: No nasal congestion or hives. Physical Examination:    Vitals:    02/17/20 1355   BP: (!) 108/56   Pulse: 86   SpO2: 96%        Constitutional and General Appearance: NAD; obese in wheelchair  Respiratory:  · Normal excursion and expansion without use of accessory muscles  · Resp Auscultation: soft crackles right base otherwise normal breath sounds  Cardiovascular:  · The apical impulses not displaced  · Heart tones are crisp and normal  · Cervical veins are not engorged  · The carotid upstroke is normal in amplitude and contour without delay or bruit  · Normal S1S2, No S3, ? soft LEFTY   · Peripheral pulses are symmetrical and full  · There is no clubbing, cyanosis of the extremities.   · 1+ BLE edema; thick skin   · Femoral Arteries: 2+ and equal  · Pedal Pulses: 2+ and equal   Abdomen:  · No masses or tenderness  · Liver/Spleen: No Abnormalities Noted  Neurological/Psychiatric:  · Alert and oriented in all spheres  · Moves all extremities well  · Exhibits normal gait balance and coordination  · No abnormalities of mood, affect, memory, mentation, or behavior are noted    Lab Results   Component Value Date    CHOL 103 05/29/2015    CHOL 107 10/03/2014     Lab Results   Component Value Date    TRIG 154 (H) 05/29/2015    TRIG 114 10/03/2014     Lab Results   Component Value Date    HDL 29 (L) 11/21/2018    HDL 34 (L) 08/22/2017    HDL 34 (L) 05/29/2015     Lab Results   Component Value Date    LDLCALC 44 11/21/2018    LDLCALC 47 08/22/2017    LDLCALC 52 10/03/2014     Lab Results   Component Value Date    LABVLDL 37 11/21/2018 LABVLDL 26 08/22/2017    LABVLDL 23 10/03/2014    VLDL 31 05/29/2015     Lab Results   Component Value Date    CREATININE 1.65 (H) 12/15/2018    BUN 25 (H) 12/15/2018     12/15/2018    K 5.0 12/15/2018    CL 97 (L) 12/15/2018    CO2 34.1 (H) 12/15/2018     Assessment:     1. CAD (coronary artery disease and mild ischemic cardiomyopathy:  Most recent LHC/RHC 10/3/14 showed severe native CAD with patent LIMA-LAD and SVG-PDA with no need for PCI. Most recent ECHO 8/22/17 showed heart muscle strength is mildly weakened, EF 40%. Continue ramipril and toprol XL for LV dysfunction. There are no concerning symptoms for angina currently. 2. Hyperlipidemia: I personally reviewed most recent labs from 11/21/18  showing well controlled lipids LDL 44 except chronically, mildly low HDL 29 and will continue current medical regimen. 3. HTN (hypertension): Well controlled and will continue current medical regimen. 4. S/P CABG x 2:  See #1 above. 5. LE edema: Will continue demadex 60mg BID. Overall baseline. Plan:  1. Meds reviewed. Refills with PCP per pt request.   2. Ok to take aspirin 81mg (in place of current adult dose 325mg) to decrease bleeding risk since you are on plavix. 3. Will call to obtain labs from PCP. 4. Follow up with me in 1 year   5. It is my medical opinion that Sury Silverman is considered an intermediate risk clinically for a lower risk type of surgery. Proceed as planned and ok to hold plavix for least amount of time per physician performing procedure. This note was scribed in the presence of Kandis Hitchcock MD by Kwame Wells, RN    I, Dr. Lawrence Correa, personally performed the services described in this documentation, as scribed by the above signed scribe in my presence. It is both accurate and complete to my knowledge.  I agree with the details independently gathered by the clinical support staff, while the remaining

## 2020-02-17 NOTE — PROGRESS NOTES
nebulizer solution Inhale 3 mLs into the lungs every 6 hours as needed for Shortness of Breath DX COPD J44.9 120 vial 6    budesonide-formoterol (SYMBICORT) 160-4.5 MCG/ACT AERO Inhale 2 puffs into the lungs 2 times daily 1 Inhaler 6    Cholecalciferol (VITAMIN D3) 2000 units CAPS Take by mouth daily      Docusate Calcium (STOOL SOFTENER PO) Take by mouth daily      metFORMIN (GLUCOPHAGE) 500 MG tablet Take 500 mg by mouth 3 times daily      polyethylene glycol (GLYCOLAX) packet Take 17 g by mouth daily as needed for Constipation      insulin glargine (BASAGLAR KWIKPEN) 100 UNIT/ML injection pen Basaglar KwikPen U-100 Insulin 100 unit/mL (3 mL) subcutaneous      aspirin 325 MG tablet Take 325 mg by mouth daily      torsemide (DEMADEX) 20 MG tablet Take 1 tablet by mouth 2 times daily Take 3 tablets twice daily (Patient taking differently: Take 40 mg by mouth 2 times daily Take 2 tablets twice daily) 30 tablet 0    Ascorbic Acid (VITAMIN C) 500 MG tablet Take 1,000 mg by mouth daily      clopidogrel (PLAVIX) 75 MG tablet Take 75 mg by mouth daily      pantoprazole sodium (PROTONIX) 40 MG PACK packet Take 40 mg by mouth 2 times daily (before meals)      ramipril (ALTACE) 1.25 MG capsule Take 1.25 mg by mouth daily      atorvastatin (LIPITOR) 40 MG tablet Take 1 tablet by mouth daily 30 tablet 3    ferrous sulfate 325 (65 FE) MG tablet Take 325 mg by mouth daily (with breakfast)      oxyCODONE HCl ER 10 MG CR tablet Take 1 tablet by mouth every 12 hours. 4 tablet 0    metoprolol (TOPROL-XL) 25 MG XL tablet Take 25 mg by mouth daily.  albuterol (PROVENTIL HFA;VENTOLIN HFA) 108 (90 BASE) MCG/ACT inhaler Inhale 2 puffs into the lungs every 6 hours as needed for Wheezing.       potassium chloride SA (K-DUR;KLOR-CON) 10 MEQ tablet Take 10 mEq by mouth 2 times daily       [DISCONTINUED] vitamin A 40194 units capsule Take 10,000 Units by mouth daily      [DISCONTINUED] tadalafil (CIALIS) 5 MG tablet without use of accessory muscles  · Resp Auscultation: soft crackles right base otherwise normal breath sounds  Cardiovascular:  · The apical impulses not displaced  · Heart tones are crisp and normal  · Cervical veins are not engorged  · The carotid upstroke is normal in amplitude and contour without delay or bruit  · Normal S1S2, No S3, ? soft LEFTY   · Peripheral pulses are symmetrical and full  · There is no clubbing, cyanosis of the extremities. · 1+ BLE edema; thick skin   · Femoral Arteries: 2+ and equal  · Pedal Pulses: 2+ and equal   Abdomen:  · No masses or tenderness  · Liver/Spleen: No Abnormalities Noted  Neurological/Psychiatric:  · Alert and oriented in all spheres  · Moves all extremities well  · Exhibits normal gait balance and coordination  · No abnormalities of mood, affect, memory, mentation, or behavior are noted    Lab Results   Component Value Date    CHOL 103 05/29/2015    CHOL 107 10/03/2014     Lab Results   Component Value Date    TRIG 154 (H) 05/29/2015    TRIG 114 10/03/2014     Lab Results   Component Value Date    HDL 29 (L) 11/21/2018    HDL 34 (L) 08/22/2017    HDL 34 (L) 05/29/2015     Lab Results   Component Value Date    LDLCALC 44 11/21/2018    LDLCALC 47 08/22/2017    LDLCALC 52 10/03/2014     Lab Results   Component Value Date    LABVLDL 37 11/21/2018    LABVLDL 26 08/22/2017    LABVLDL 23 10/03/2014    VLDL 31 05/29/2015     Lab Results   Component Value Date    CREATININE 1.65 (H) 12/15/2018    BUN 25 (H) 12/15/2018     12/15/2018    K 5.0 12/15/2018    CL 97 (L) 12/15/2018    CO2 34.1 (H) 12/15/2018     Assessment:     1. CAD (coronary artery disease and mild ischemic cardiomyopathy:  Most recent LHC/RHC 10/3/14 showed severe native CAD with patent LIMA-LAD and SVG-PDA with no need for PCI. Most recent ECHO 8/22/17 showed heart muscle strength is mildly weakened, EF 40%. Continue ramipril and toprol XL for LV dysfunction.  There are no concerning symptoms for angina currently. 2. Hyperlipidemia: I personally reviewed most recent labs from 11/21/18  showing well controlled lipids LDL 44 except chronically, mildly low HDL 29 and will continue current medical regimen. 3. HTN (hypertension): Well controlled and will continue current medical regimen. 4. S/P CABG x 2:  See #1 above. 5. LE edema: Will continue demadex 60mg BID. Overall baseline. Plan:  1. Meds reviewed. Refills with PCP per pt request.   2. Ok to take aspirin 81mg (in place of current adult dose 325mg) to decrease bleeding risk since you are on plavix. 3. Will call to obtain labs from PCP. 4. Follow up with me in 1 year   5. It is my medical opinion that Juliano Sheppard is considered an intermediate risk clinically for a lower risk type of surgery. Proceed as planned and ok to hold plavix for least amount of time per physician performing procedure. This note was scribed in the presence of Dana Marie MD by Macy Goodwin RN    I, Dr. Jelly Ortiz, personally performed the services described in this documentation, as scribed by the above signed scribe in my presence. It is both accurate and complete to my knowledge. I agree with the details independently gathered by the clinical support staff, while the remaining scribed note accurately describes my personal service to the patient. Cost of prescription medications and patient compliance have been reviewed with patient. All questions answered. Thank you for allowing me to participate in the care of this individual.    Sarah Correa.  Roberto Patel M.D., [facility]

## 2020-05-28 ENCOUNTER — VIRTUAL VISIT (OUTPATIENT)
Dept: PULMONOLOGY | Age: 70
End: 2020-05-28
Payer: MEDICARE

## 2020-05-28 ENCOUNTER — TELEPHONE (OUTPATIENT)
Dept: PULMONOLOGY | Age: 70
End: 2020-05-28

## 2020-05-28 ENCOUNTER — HOSPITAL ENCOUNTER (OUTPATIENT)
Dept: CT IMAGING | Age: 70
Discharge: HOME | End: 2020-05-28
Payer: MEDICARE

## 2020-05-28 VITALS — BODY MASS INDEX: 42.66 KG/M2 | HEIGHT: 72 IN | WEIGHT: 315 LBS

## 2020-05-28 PROCEDURE — G0297 LDCT FOR LUNG CA SCREEN: HCPCS

## 2020-05-28 PROCEDURE — 99214 OFFICE O/P EST MOD 30 MIN: CPT | Performed by: INTERNAL MEDICINE

## 2020-05-28 RX ORDER — BUDESONIDE AND FORMOTEROL FUMARATE DIHYDRATE 160; 4.5 UG/1; UG/1
2 AEROSOL RESPIRATORY (INHALATION) 2 TIMES DAILY
Qty: 1 INHALER | Refills: 6 | Status: SHIPPED | OUTPATIENT
Start: 2020-05-28 | End: 2021-06-03 | Stop reason: SDUPTHER

## 2020-05-28 RX ORDER — PREDNISONE 10 MG/1
TABLET ORAL
Qty: 30 TABLET | Refills: 0 | Status: SHIPPED | OUTPATIENT
Start: 2020-05-28 | End: 2020-06-07

## 2020-05-28 RX ORDER — DOXYCYCLINE HYCLATE 100 MG
100 TABLET ORAL 2 TIMES DAILY
Qty: 10 TABLET | Refills: 0 | Status: SHIPPED | OUTPATIENT
Start: 2020-05-28 | End: 2020-06-02

## 2020-05-28 ASSESSMENT — SLEEP AND FATIGUE QUESTIONNAIRES
HOW LIKELY ARE YOU TO NOD OFF OR FALL ASLEEP WHILE SITTING AND TALKING TO SOMEONE: 0
HOW LIKELY ARE YOU TO NOD OFF OR FALL ASLEEP WHEN YOU ARE A PASSENGER IN A CAR FOR AN HOUR WITHOUT A BREAK: 0
ESS TOTAL SCORE: 6
HOW LIKELY ARE YOU TO NOD OFF OR FALL ASLEEP WHILE SITTING INACTIVE IN A PUBLIC PLACE: 0
HOW LIKELY ARE YOU TO NOD OFF OR FALL ASLEEP WHILE WATCHING TV: 2
HOW LIKELY ARE YOU TO NOD OFF OR FALL ASLEEP WHILE SITTING AND READING: 2
HOW LIKELY ARE YOU TO NOD OFF OR FALL ASLEEP IN A CAR, WHILE STOPPED FOR A FEW MINUTES IN TRAFFIC: 0
HOW LIKELY ARE YOU TO NOD OFF OR FALL ASLEEP WHILE SITTING QUIETLY AFTER LUNCH WITHOUT ALCOHOL: 0
HOW LIKELY ARE YOU TO NOD OFF OR FALL ASLEEP WHILE LYING DOWN TO REST IN THE AFTERNOON WHEN CIRCUMSTANCES PERMIT: 2

## 2020-05-28 NOTE — TELEPHONE ENCOUNTER
Need to call [place] for BIPAP comp      5/28/2020    Assessment:       · Severe COPD   · Chronic hypoxemic respiratory failure  · Cor pulmonale  · Pulmonary nodules- stable on repeat CT 5/28/2020. · Severe CHRISTOFER on BiPAP 17/12 cmH2O with 5LPM. Optimal compliance and efficacy upon review today. · History of Tracheostomy 10/2013  · CHF (EF 40%) CAD post CABG  · 1-2 ppd for 45 years- quit 2010- unremarkable LDCT 5/9/2019  · Wheel chair since 2015         Plan:       · Continue Symbicort 160/4.5 2 puffs BID and DuoNeb QID PRN   · 3L O2 rest and 5L exertion. Advised to titrate O2 using her pulse oximeter- target O2 sat 90-92%. · Prednisone taper and Doxycycline 100 mg BID x 5 days for COPD AE self management if needed. · Patient is up to date with Pneumococcal vaccine and influenza vaccine   · CT chest, low dose protocol, screening for lung cancer 5/2021. Risks, benefits and alternatives including doing nothing were discussed with patient. · Obtain BiPAP download   · Advised to use BiPAP 6-8 hrs at night and during naps. · Replacement of mask, tubing, head straps every 3-6 months or sooner if damaged. · Follow up BIPAP compliance and pressure adjustment if needed  · Sleep hygiene  · Avoid sedatives, alcohol and caffeinated drinks at bed time. · No driving motorized vehicles or operating heavy machinery while fatigue, drowsy or sleepy. · Weight loss is also recommended as a long-term intervention.     · Follow up in 6 months

## 2020-05-28 NOTE — PROGRESS NOTES
chew.  ETOH:   reports no history of alcohol use.       Family History:       Problem Relation Age of Onset    Cancer Mother     Heart Failure Father     Emphysema Sister        Current Medications:    Current Outpatient Medications:     amitriptyline (ELAVIL) 25 MG tablet, Take 25 mg by mouth nightly, Disp: , Rfl:     allopurinol (ZYLOPRIM) 300 MG tablet, allopurinol 300 mg tablet, Disp: , Rfl:     JARDIANCE 25 MG tablet, TAKE 1 TABLET BY MOUTH ONCE DAILY, Disp: , Rfl:     metOLazone (ZAROXOLYN) 5 MG tablet, Take 1 tablet by mouth as needed, Disp: , Rfl:     aspirin EC 81 MG EC tablet, Take 1 tablet by mouth daily, Disp: 90 tablet, Rfl: 1    calcitRIOL (ROCALTROL) 0.25 MCG capsule, Take 0.25 mcg by mouth daily, Disp: , Rfl:     ipratropium-albuterol (DUONEB) 0.5-2.5 (3) MG/3ML SOLN nebulizer solution, Inhale 3 mLs into the lungs every 6 hours as needed for Shortness of Breath DX COPD J44.9, Disp: 120 vial, Rfl: 6    budesonide-formoterol (SYMBICORT) 160-4.5 MCG/ACT AERO, Inhale 2 puffs into the lungs 2 times daily, Disp: 1 Inhaler, Rfl: 6    Cholecalciferol (VITAMIN D3) 2000 units CAPS, Take by mouth daily, Disp: , Rfl:     Docusate Calcium (STOOL SOFTENER PO), Take by mouth daily, Disp: , Rfl:     metFORMIN (GLUCOPHAGE) 500 MG tablet, Take 500 mg by mouth 3 times daily, Disp: , Rfl:     polyethylene glycol (GLYCOLAX) packet, Take 17 g by mouth daily as needed for Constipation, Disp: , Rfl:     insulin glargine (BASAGLAR KWIKPEN) 100 UNIT/ML injection pen, Basaglar KwikPen U-100 Insulin 100 unit/mL (3 mL) subcutaneous, Disp: , Rfl:     torsemide (DEMADEX) 20 MG tablet, Take 1 tablet by mouth 2 times daily Take 3 tablets twice daily (Patient taking differently: Take 40 mg by mouth 2 times daily Take 2 tablets twice daily), Disp: 30 tablet, Rfl: 0    Ascorbic Acid (VITAMIN C) 500 MG tablet, Take 1,000 mg by mouth daily, Disp: , Rfl:     clopidogrel (PLAVIX) 75 MG tablet, Take 75 mg by mouth daily, Disp: , Rfl:     pantoprazole sodium (PROTONIX) 40 MG PACK packet, Take 40 mg by mouth 2 times daily (before meals), Disp: , Rfl:     ramipril (ALTACE) 1.25 MG capsule, Take 1.25 mg by mouth daily, Disp: , Rfl:     atorvastatin (LIPITOR) 40 MG tablet, Take 1 tablet by mouth daily, Disp: 30 tablet, Rfl: 3    ferrous sulfate 325 (65 FE) MG tablet, Take 325 mg by mouth daily (with breakfast), Disp: , Rfl:     oxyCODONE HCl ER 10 MG CR tablet, Take 1 tablet by mouth every 12 hours. , Disp: 4 tablet, Rfl: 0    metoprolol (TOPROL-XL) 25 MG XL tablet, Take 25 mg by mouth daily. , Disp: , Rfl:     metolazone (ZAROXOLYN) 2.5 MG tablet, Take 1 tablet by mouth daily as needed for up to 30 days. For weight gain of 2 lbs and worsening leg swelling, Disp: 30 tablet, Rfl: 3    albuterol (PROVENTIL HFA;VENTOLIN HFA) 108 (90 BASE) MCG/ACT inhaler, Inhale 2 puffs into the lungs every 6 hours as needed for Wheezing., Disp: , Rfl:     potassium chloride SA (K-DUR;KLOR-CON) 10 MEQ tablet, Take 10 mEq by mouth 2 times daily , Disp: , Rfl:       Objective:   PHYSICAL EXAM:    Height 6' (1.829 m), weight (!) 344 lb (156 kg). '   O2 Sat:94% on 3LPM   HR:  BP:  RR:  Temperature:  Neck size:  inches   Mallampati class IV. Constitutional:  No acute distress. Appears well developed and nourished. Eyes: No sclera icterus. EOM intact. No visible discharge. HENT: Head is normocephalic and atraumatic. Mucus membranes are moist and the tongue appears normal. Normal appearing nose. External Ears normal.   Neck: No visualized mass. Danice Jose is midline   Resp: No accessory muscle use. Respiratory effort normal. No visualized signs of difficulty breathing or respiratory distress. Cardiovascular: No LE edema. Musculoskeletal: Normal gait with no signs of ataxia. Normal range of motion of the neck. Skin: No significant exanthematous lesions or discoloration noted on facial skin    Neuro: Awake. Alert. Able to follow commands.   No facial

## 2020-05-28 NOTE — TELEPHONE ENCOUNTER
limited to the following:    Your Provider(s) may not able to provide medical treatment for your particular condition and you may be required to seek alternative healthcare or emergency care services.  The electronic systems or other security protocols or safeguards used in the Service could fail, causing a breach of privacy of your medical or other information.  Given regulatory requirements in certain jurisdictions, your Provider(s) diagnosis and/or treatment options, especially pertaining to certain prescriptions, may be limited. Acceptance   1. You understand that Services will be provided via Telehealth. This process involves the use of HIPAA compliant and secure, real-time audio-visual interfacing with a qualified and appropriately trained provider located at [facility]. 2. You understand that, under no circumstances, will this session be recorded. 3. You understand that the Provider(s) at [facility] and other clinical participants will be party to the information obtained during the Telehealth session in accordance with best medical practices. 4. You understand that the information obtained during the Telehealth session will be used to help determine the most appropriate treatment options. 5. You understand that You have the right to revoke this consent at any point in time. 6. You understand that Telehealth is voluntary, and that continued treatment is not dependent upon consent. 7. You understand that, in the event of non-consent to Telehealth services and/or technical difficulties, you will obtain services as typically provided in the absence of Telehealth technology. 8. You understand that this consent will be kept in Your medical record. 9. No potential benefits from the use of Telehealth or specific results can be guaranteed. Your condition may not be cured or improved and, in some cases, may get worse.    10. There are limitations in the provision of medical care and treatment via Telehealth and the Service and you may not be able to receive diagnosis and/or treatment through the Service for every condition for which you seek diagnosis and/or treatment. 11. There are potential risks to the use of Telehealth, including but not limited to the risks described in this Telehealth Consent. 12. Your Provider(s) have discussed the use of Telehealth and the Service with you, including the benefits and risks of such and you have provided oral consent to your Provider(s) for the use of Telehealth and the Service. 15. You understand that it is your duty to provide your Provider(s) truthful, accurate and complete information, including all relevant information regarding care that you may have received or may be receiving from other healthcare providers outside of the Service. 14. You understand that each of your Provider(s) may determine in his or sole discretion that your condition is not suitable for diagnosis and/or treatment using the Service, and that you may need to seek medical care and treatment a specialist or other healthcare provider, outside of the Service. 15. You understand that you are fully responsible for payment for all services provided by Provider(s) or through use of the Service and that you may not be able to use third-party insurance. 16. You represent that (a) you have read this Telehealth Consent carefully, (b) you understand the risks and benefits of the Service and the use of Telehealth in the medical care and treatment provided to you by Provider(s) using the Service, and (c) you have the legal capacity and authority to provide this consent for yourself and/or the minor for which you are consenting under applicable federal and state laws, including laws relating to the age of [de-identified] and/or parental/guardian consent.    17. You give your informed consent to the use of Telehealth by Provider(s) using the Service under

## 2020-12-02 ENCOUNTER — TELEPHONE (OUTPATIENT)
Dept: PULMONOLOGY | Age: 70
End: 2020-12-02

## 2020-12-02 NOTE — TELEPHONE ENCOUNTER
Patient cancelled appointment on 12/3/20 with Dr. Paolo Abreu for 6 month fua. Reason: pt has an appt at 3 same day    Patient did not reschedule appointment. Appointment rescheduled for pt states he'll just keep his already scheduled appt in June. Last OV 5/28/20      Assessment:       · Severe COPD   · Chronic hypoxemic respiratory failure  · Cor pulmonale  · Pulmonary nodules- stable on repeat CT 5/28/2020. · Severe CHRISTOFER on BiPAP 17/12 cmH2O with 5LPM. Optimal compliance and efficacy upon review today. · History of Tracheostomy 10/2013  · CHF (EF 40%) CAD post CABG  · 1-2 ppd for 45 years- quit 2010- unremarkable LDCT 5/9/2019  · Wheel chair since 2015         Plan:       · Continue Symbicort 160/4.5 2 puffs BID and DuoNeb QID PRN   · 3L O2 rest and 5L exertion. Advised to titrate O2 using her pulse oximeter- target O2 sat 90-92%. · Prednisone taper and Doxycycline 100 mg BID x 5 days for COPD AE self management if needed. · Patient is up to date with Pneumococcal vaccine and influenza vaccine   · CT chest, low dose protocol, screening for lung cancer 5/2021. Risks, benefits and alternatives including doing nothing were discussed with patient. · Obtain BiPAP download   · Advised to use BiPAP 6-8 hrs at night and during naps. · Replacement of mask, tubing, head straps every 3-6 months or sooner if damaged. · Follow up BIPAP compliance and pressure adjustment if needed  · Sleep hygiene  · Avoid sedatives, alcohol and caffeinated drinks at bed time. · No driving motorized vehicles or operating heavy machinery while fatigue, drowsy or sleepy. · Weight loss is also recommended as a long-term intervention.     · Follow up in 6 months
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
## 2021-03-05 ENCOUNTER — OFFICE VISIT (OUTPATIENT)
Dept: CARDIOLOGY CLINIC | Age: 71
End: 2021-03-05
Payer: MEDICARE

## 2021-03-05 VITALS
WEIGHT: 315 LBS | HEART RATE: 96 BPM | BODY MASS INDEX: 42.66 KG/M2 | OXYGEN SATURATION: 99 % | SYSTOLIC BLOOD PRESSURE: 130 MMHG | HEIGHT: 72 IN | DIASTOLIC BLOOD PRESSURE: 60 MMHG

## 2021-03-05 DIAGNOSIS — E78.2 MIXED HYPERLIPIDEMIA: ICD-10-CM

## 2021-03-05 DIAGNOSIS — Z87.891 HISTORY OF TOBACCO ABUSE: ICD-10-CM

## 2021-03-05 DIAGNOSIS — I25.10 CORONARY ARTERY DISEASE INVOLVING NATIVE CORONARY ARTERY OF NATIVE HEART WITHOUT ANGINA PECTORIS: Primary | ICD-10-CM

## 2021-03-05 DIAGNOSIS — I25.5 ISCHEMIC CARDIOMYOPATHY: ICD-10-CM

## 2021-03-05 DIAGNOSIS — I10 ESSENTIAL HYPERTENSION: ICD-10-CM

## 2021-03-05 PROCEDURE — G8484 FLU IMMUNIZE NO ADMIN: HCPCS | Performed by: INTERNAL MEDICINE

## 2021-03-05 PROCEDURE — 3017F COLORECTAL CA SCREEN DOC REV: CPT | Performed by: INTERNAL MEDICINE

## 2021-03-05 PROCEDURE — 1036F TOBACCO NON-USER: CPT | Performed by: INTERNAL MEDICINE

## 2021-03-05 PROCEDURE — 99214 OFFICE O/P EST MOD 30 MIN: CPT | Performed by: INTERNAL MEDICINE

## 2021-03-05 PROCEDURE — 1123F ACP DISCUSS/DSCN MKR DOCD: CPT | Performed by: INTERNAL MEDICINE

## 2021-03-05 PROCEDURE — G8417 CALC BMI ABV UP PARAM F/U: HCPCS | Performed by: INTERNAL MEDICINE

## 2021-03-05 PROCEDURE — 4040F PNEUMOC VAC/ADMIN/RCVD: CPT | Performed by: INTERNAL MEDICINE

## 2021-03-05 PROCEDURE — 93000 ELECTROCARDIOGRAM COMPLETE: CPT | Performed by: INTERNAL MEDICINE

## 2021-03-05 PROCEDURE — G8427 DOCREV CUR MEDS BY ELIG CLIN: HCPCS | Performed by: INTERNAL MEDICINE

## 2021-03-05 RX ORDER — TORSEMIDE 20 MG/1
40 TABLET ORAL 2 TIMES DAILY
Qty: 90 TABLET | Refills: 3
Start: 2021-03-05 | End: 2022-08-15 | Stop reason: SDUPTHER

## 2021-03-05 RX ORDER — METOLAZONE 5 MG/1
5 TABLET ORAL PRN
Qty: 30 TABLET | Refills: 3 | Status: SHIPPED | OUTPATIENT
Start: 2021-03-05 | End: 2022-05-31
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
## 2021-03-05 NOTE — PATIENT INSTRUCTIONS
Plan:  1. Meds reviewed. Refills with PCP per pt request    2. OK to take metolazone 5mg a day as needed up to 3 times a week for increase swelling, weight gain, or shortness of breath. New Rx sent to pharmacy. 3. No recent labs. Will obtain labs from Dr. Arlyn Levy for review  4. Follow up with me in 3 months  5.  Recommend COVID vaccine when available to you

## 2021-03-11 ENCOUNTER — HOSPITAL ENCOUNTER (OUTPATIENT)
Age: 71
Discharge: HOME | End: 2021-03-11
Payer: MEDICARE

## 2021-03-11 DIAGNOSIS — I25.10 CORONARY ARTERY DISEASE INVOLVING NATIVE CORONARY ARTERY OF NATIVE HEART WITHOUT ANGINA PECTORIS: ICD-10-CM

## 2021-03-11 DIAGNOSIS — I25.5 ISCHEMIC CARDIOMYOPATHY: ICD-10-CM

## 2021-03-11 LAB
A/G RATIO: 1.2 (ref 1.1–2.2)
ALBUMIN SERPL-MCNC: 3.9 G/DL (ref 3.4–5)
ALP BLD-CCNC: 71 U/L (ref 40–129)
ALT SERPL-CCNC: 13 U/L (ref 10–40)
ANION GAP SERPL CALCULATED.3IONS-SCNC: 9 MMOL/L (ref 3–16)
AST SERPL-CCNC: 12 U/L (ref 15–37)
BILIRUB SERPL-MCNC: 0.3 MG/DL (ref 0–1)
BUN BLDV-MCNC: 35 MG/DL (ref 7–20)
CALCIUM SERPL-MCNC: 9.5 MG/DL (ref 8.3–10.6)
CHLORIDE BLD-SCNC: 94 MMOL/L (ref 99–110)
CO2: 38 MMOL/L (ref 21–32)
CREAT SERPL-MCNC: 1.6 MG/DL (ref 0.8–1.3)
GFR AFRICAN AMERICAN: 52
GFR NON-AFRICAN AMERICAN: 43
GLOBULIN: 3.2 G/DL
GLUCOSE BLD-MCNC: 146 MG/DL (ref 70–99)
HCT VFR BLD CALC: 30.4 % (ref 40.5–52.5)
HEMOGLOBIN: 9.6 G/DL (ref 13.5–17.5)
MCH RBC QN AUTO: 28.1 PG (ref 26–34)
MCHC RBC AUTO-ENTMCNC: 31.7 G/DL (ref 31–36)
MCV RBC AUTO: 88.6 FL (ref 80–100)
PDW BLD-RTO: 17.5 % (ref 12.4–15.4)
PLATELET # BLD: 204 K/UL (ref 135–450)
PMV BLD AUTO: 7.3 FL (ref 5–10.5)
POTASSIUM SERPL-SCNC: 4.5 MMOL/L (ref 3.5–5.1)
RBC # BLD: 3.43 M/UL (ref 4.2–5.9)
SODIUM BLD-SCNC: 141 MMOL/L (ref 136–145)
TOTAL PROTEIN: 7.1 G/DL (ref 6.4–8.2)
WBC # BLD: 10.7 K/UL (ref 4–11)

## 2021-03-11 PROCEDURE — 80053 COMPREHEN METABOLIC PANEL: CPT

## 2021-03-11 PROCEDURE — 36415 COLL VENOUS BLD VENIPUNCTURE: CPT

## 2021-03-11 PROCEDURE — 85027 COMPLETE CBC AUTOMATED: CPT

## 2021-03-11 PROCEDURE — 80061 LIPID PANEL: CPT

## 2021-03-12 LAB
CHOLESTEROL, FASTING: 108 MG/DL (ref 0–199)
HDLC SERPL-MCNC: 26 MG/DL (ref 40–60)
LDL CHOLESTEROL CALCULATED: 49 MG/DL
TRIGLYCERIDE, FASTING: 165 MG/DL (ref 0–150)
VLDLC SERPL CALC-MCNC: 33 MG/DL

## 2021-06-03 ENCOUNTER — VIRTUAL VISIT (OUTPATIENT)
Dept: PULMONOLOGY | Age: 71
End: 2021-06-03
Payer: MEDICARE

## 2021-06-03 ENCOUNTER — HOSPITAL ENCOUNTER (OUTPATIENT)
Dept: CT IMAGING | Age: 71
Discharge: HOME | End: 2021-06-03
Payer: MEDICARE

## 2021-06-03 ENCOUNTER — TELEPHONE (OUTPATIENT)
Dept: PULMONOLOGY | Age: 71
End: 2021-06-03

## 2021-06-03 DIAGNOSIS — Z87.891 HISTORY OF TOBACCO USE: Primary | ICD-10-CM

## 2021-06-03 DIAGNOSIS — J44.1 COPD EXACERBATION (HCC): ICD-10-CM

## 2021-06-03 DIAGNOSIS — Z87.891 PERSONAL HISTORY OF TOBACCO USE: ICD-10-CM

## 2021-06-03 DIAGNOSIS — R91.8 PULMONARY NODULES: ICD-10-CM

## 2021-06-03 DIAGNOSIS — J96.11 CHRONIC HYPOXEMIC RESPIRATORY FAILURE (HCC): ICD-10-CM

## 2021-06-03 PROCEDURE — 99443 PR PHYS/QHP TELEPHONE EVALUATION 21-30 MIN: CPT | Performed by: INTERNAL MEDICINE

## 2021-06-03 PROCEDURE — 71271 CT THORAX LUNG CANCER SCR C-: CPT

## 2021-06-03 RX ORDER — PREDNISONE 10 MG/1
TABLET ORAL
Qty: 30 TABLET | Refills: 0 | Status: SHIPPED | OUTPATIENT
Start: 2021-06-03 | End: 2021-06-15

## 2021-06-03 RX ORDER — BUDESONIDE AND FORMOTEROL FUMARATE DIHYDRATE 160; 4.5 UG/1; UG/1
2 AEROSOL RESPIRATORY (INHALATION) 2 TIMES DAILY
Qty: 1 INHALER | Refills: 6 | Status: SHIPPED | OUTPATIENT
Start: 2021-06-03 | End: 2021-12-16 | Stop reason: SDUPTHER

## 2021-06-03 ASSESSMENT — SLEEP AND FATIGUE QUESTIONNAIRES
HOW LIKELY ARE YOU TO NOD OFF OR FALL ASLEEP WHILE LYING DOWN TO REST IN THE AFTERNOON WHEN CIRCUMSTANCES PERMIT: 2
HOW LIKELY ARE YOU TO NOD OFF OR FALL ASLEEP WHILE SITTING QUIETLY AFTER LUNCH WITHOUT ALCOHOL: 2
HOW LIKELY ARE YOU TO NOD OFF OR FALL ASLEEP WHILE SITTING AND READING: 2
HOW LIKELY ARE YOU TO NOD OFF OR FALL ASLEEP WHEN YOU ARE A PASSENGER IN A CAR FOR AN HOUR WITHOUT A BREAK: 2
HOW LIKELY ARE YOU TO NOD OFF OR FALL ASLEEP WHILE WATCHING TV: 2
HOW LIKELY ARE YOU TO NOD OFF OR FALL ASLEEP WHILE SITTING AND TALKING TO SOMEONE: 3
HOW LIKELY ARE YOU TO NOD OFF OR FALL ASLEEP IN A CAR, WHILE STOPPED FOR A FEW MINUTES IN TRAFFIC: 3
ESS TOTAL SCORE: 18
HOW LIKELY ARE YOU TO NOD OFF OR FALL ASLEEP WHILE SITTING INACTIVE IN A PUBLIC PLACE: 2

## 2021-06-03 NOTE — TELEPHONE ENCOUNTER
Within this Telehealth Consent, the terms you and yours refer to the person using the Telehealth Service (Service), or in the case of a use of the Service by or on behalf of a minor, you and yours refer to and include (i) the parent or legal guardian who provides consent to the use of the Service by such minor or uses the Service on behalf of such minor, and (ii) the minor for whom consent is being provided or on whose behalf the Service is being utilized. When using Service, you will be consulting with your health care providers via the use of Telehealth.   Telehealth involves the delivery of healthcare services using electronic communications, information technology or other means between a healthcare provider and a patient who are not in the same physical location. Telehealth may be used for diagnosis, treatment, follow-up and/or patient education, and may include, but is not limited to, one or more of the following:    Electronic transmission of medical records, photo images, personal health information or other data between a patient and a healthcare provider    Interactions between a patient and healthcare provider via audio, video and/or data communications    Use of output data from medical devices, sound and video files    Anticipated Benefits   The use of Telehealth by your Provider(s) through the Service may have the following possible benefits:    Making it easier and more efficient for you to access medical care and treatment for the conditions treated by such Provider(s) utilizing the Service    Allowing you to obtain medical care and treatment by Provider(s) at times that are convenient for you    Enabling you to interact with Provider(s) without the necessity of an in-office appointment     Possible Risks   While the use of Telehealth can provide potential benefits for you, there are also potential risks associated with the use of Telehealth.  These risks include, but may not be limited to the following:    Your Provider(s) may not able to provide medical treatment for your particular condition and you may be required to seek alternative healthcare or emergency care services.  The electronic systems or other security protocols or safeguards used in the Service could fail, causing a breach of privacy of your medical or other information.  Given regulatory requirements in certain jurisdictions, your Provider(s) diagnosis and/or treatment options, especially pertaining to certain prescriptions, may be limited. Acceptance   1. You understand that Services will be provided via Telehealth. This process involves the use of HIPAA compliant and secure, real-time audio-visual interfacing with a qualified and appropriately trained provider located at [facility]. 2. You understand that, under no circumstances, will this session be recorded. 3. You understand that the Provider(s) at [facility] and other clinical participants will be party to the information obtained during the Telehealth session in accordance with best medical practices. 4. You understand that the information obtained during the Telehealth session will be used to help determine the most appropriate treatment options. 5. You understand that You have the right to revoke this consent at any point in time. 6. You understand that Telehealth is voluntary, and that continued treatment is not dependent upon consent. 7. You understand that, in the event of non-consent to Telehealth services and/or technical difficulties, you will obtain services as typically provided in the absence of Telehealth technology. 8. You understand that this consent will be kept in Your medical record. 9. No potential benefits from the use of Telehealth or specific results can be guaranteed. Your condition may not be cured or improved and, in some cases, may get worse.    10. There are limitations in the provision of medical care and treatment via Telehealth and the Service and you may not be able to receive diagnosis and/or treatment through the Service for every condition for which you seek diagnosis and/or treatment. 11. There are potential risks to the use of Telehealth, including but not limited to the risks described in this Telehealth Consent. 12. Your Provider(s) have discussed the use of Telehealth and the Service with you, including the benefits and risks of such and you have provided oral consent to your Provider(s) for the use of Telehealth and the Service. 15. You understand that it is your duty to provide your Provider(s) truthful, accurate and complete information, including all relevant information regarding care that you may have received or may be receiving from other healthcare providers outside of the Service. 14. You understand that each of your Provider(s) may determine in his or sole discretion that your condition is not suitable for diagnosis and/or treatment using the Service, and that you may need to seek medical care and treatment a specialist or other healthcare provider, outside of the Service. 15. You understand that you are fully responsible for payment for all services provided by Provider(s) or through use of the Service and that you may not be able to use third-party insurance. 16. You represent that (a) you have read this Telehealth Consent carefully, (b) you understand the risks and benefits of the Service and the use of Telehealth in the medical care and treatment provided to you by Provider(s) using the Service, and (c) you have the legal capacity and authority to provide this consent for yourself and/or the minor for which you are consenting under applicable federal and state laws, including laws relating to the age of [de-identified] and/or parental/guardian consent.    17. You give your informed consent to the use of Telehealth by Provider(s) using the Service under the terms described in the Terms of Service and this Telehealth Consent. The patient was read the following statement and has consented to the visit as of 6/3/21. The patient has been scheduled for their first telehealth visit on 6/3/21 with .

## 2021-06-03 NOTE — PROGRESS NOTES
[facility]  TELEHEALTH EVALUATION: Service performed was Audio ([place] and not a face-to-face visit         CHIEF COMPLAINT: Follow-up CT chest/COPD        HPI:   CT chest reviewed by me and noted below. Results were dicussed with patient and multiple good questions were answered. Feels more SOB lately   Uses Neb 2-3 times/day   Some cough with white sputum  No hemoptysis   O2- 3LPM - sat is 92%     Uses BiPAP every night. Uses full face mask. + dry mouth at times. Uses BiPAP every night for 5-7 hrs. No nodding off when driving. Goes to bed 2 am and wakes 10 am. Sleep onset of 30 min. Past Medical History:   Diagnosis Date    CAD (coronary artery disease)     MI    [facility]     Chronic kidney disease     COPD (chronic obstructive pulmonary disease) ([address]     Diabetes mellitus ([address]     DVT of leg (deep venous thrombosis) ([address]     Hyperlipidemia     Hypertension     MRSA infection within last 3 months     Pneumonia     Unspecified diseases of blood and blood-forming organs        Past Surgical History:        Procedure Laterality Date    ANKLE FRACTURE SURGERY Left 2/22/15    ORIF; LEFT ANKLE OPEN REDUCTION INTERNAL FIXATION    BACK SURGERY      CARDIAC SURGERY      2 VESSEL CABG    CORONARY ARTERY BYPASS GRAFT      DIAGNOSTIC CARDIAC CATH LAB PROCEDURE      FRACTURE SURGERY      LEG SURGERY      VEIN REMOVED FROM L  LEG  PLACED IN RT LEG    LEG SURGERY  2016    [facility]       Allergies:  has No Known Allergies. Social History:    TOBACCO:   reports that he quit smoking about 11 years ago. His smoking use included cigarettes. He has a 80.00 pack-year smoking history. He quit smokeless tobacco use about 9 years ago. His smokeless tobacco use included chew. ETOH:   reports no history of alcohol use.       Family History:       Problem Relation MG tablet, Take 1 tablet by mouth daily, Disp: 30 tablet, Rfl: 3    ferrous sulfate 325 (65 FE) MG tablet, Take 325 mg by mouth daily (with breakfast), Disp: , Rfl:     oxyCODONE HCl ER 10 MG CR tablet, Take 1 tablet by mouth every 12 hours. (Patient not taking: Reported on 3/5/2021), Disp: 4 tablet, Rfl: 0    metoprolol (TOPROL-XL) 25 MG XL tablet, Take 25 mg by mouth daily. , Disp: , Rfl:     albuterol (PROVENTIL HFA;VENTOLIN HFA) 108 (90 BASE) MCG/ACT inhaler, Inhale 2 puffs into the lungs every 6 hours as needed for Wheezing., Disp: , Rfl:     potassium chloride SA (K-DUR;KLOR-CON) 10 MEQ tablet, Take 10 mEq by mouth 2 times daily , Disp: , Rfl:       Objective:   PHYSICAL EXAM:    There were no vitals taken for this visit.'   Telephone visit not able to obtain physical exam               DATA reviewed by me:     CT chest 5/5/18   No suspicious pulmonary nodules. Continued active surveillance is  recommended including a low-dose lung cancer screening chest CT examination  in 12 months, as referenced below. CT chest 5/28/2020 imaging reviewed by me and showed  Unchanged solid subcentimeter pulmonary nodules    CT chest 6/3/21  images reviewed by me and showed:   Unchanged solid subcentimeter pulmonary nodules        Split-night 1/31/14 AHI 69 CPAP 15 some ear H2O with 3 L O2    BiPAP data 12/18-01/16 2019 reviewed by me. 6-7 hrs/night with 96% compliance and AHI of  0.2. BiPAP 17/12  BiPAP data 04/09-05/08 2019 reviewed by me. 4-5 hrs/night with 70% compliance and AHI of  0.1.   BiPAP 17/12      Assessment:       · Severe COPD with AE  · Chronic hypoxemic respiratory failure  · Cor pulmonale  · Pulmonary nodules stable on repeat CT 6/3/2021  · Severe CHRISTOFER on BiPAP 17/12 cmH2O with 5LPM. Heated tubing and full face mask   · History of Tracheostomy 10/2013  · CHF (EF 40%) CAD post CABG  · 1-2 ppd for 45 years- quit 2010- unremarkable LDCT 5/9/2019  · Wheel chair since 2015         Plan:       · Prednisone taper   · Refill on Symbicort 160/4.5 2 puffs BID   · Continue DuoNeb QID PRN   · 3L O2 rest and 5L exertion. Advised to titrate O2 using her pulse oximeter- target O2 sat 90-92%. · Patient is up to date with Covid, pneumococcal vaccine and influenza vaccine   · CT chest, low dose protocol, screening for lung cancer May 2022. Risks, benefits and alternatives including doing nothing were discussed with patient. · Continue BiPAP 6-8 hrs at night and during naps. · Replacement of mask, tubing, head straps every 3-6 months or sooner if damaged. · Follow up BIPAP compliance and pressure adjustment if needed  · Sleep hygiene  · Avoid sedatives, alcohol and caffeinated drinks at bed time. · No driving motorized vehicles or operating heavy machinery while fatigue, drowsy or sleepy. · Weight loss is also recommended as a long-term intervention. · Follow up in 6 months           Kimberly Perkins is a 73 y.o. male evaluated via telephone on 6/3/2021. Consent:  He and/or health care decision maker is aware that that he may receive a bill for this telephone service, depending on his insurance coverage, and has provided verbal consent to proceed: Yes       Documentation:  I communicated with the patient and/or health care decision maker about: See above   Details of this discussion including any medical advice provided: See above       I Affirm this is a Patient Initiated Episode with an Established Patient who has not had a related appointment within my department in the past 7 days or scheduled within the next 24 hours.     Total Time: 21-30 minutes     Note: not billable if this call serves to triage the patient into an appointment for the relevant concern      Adina Das MD

## 2021-06-11 ENCOUNTER — OFFICE VISIT (OUTPATIENT)
Dept: CARDIOLOGY CLINIC | Age: 71
End: 2021-06-11
Payer: MEDICARE

## 2021-06-11 VITALS
TEMPERATURE: 97.7 F | SYSTOLIC BLOOD PRESSURE: 132 MMHG | HEIGHT: 71 IN | HEART RATE: 86 BPM | BODY MASS INDEX: 44.1 KG/M2 | DIASTOLIC BLOOD PRESSURE: 60 MMHG | OXYGEN SATURATION: 93 % | WEIGHT: 315 LBS

## 2021-06-11 DIAGNOSIS — I10 ESSENTIAL HYPERTENSION: ICD-10-CM

## 2021-06-11 DIAGNOSIS — I25.10 CORONARY ARTERY DISEASE INVOLVING NATIVE CORONARY ARTERY OF NATIVE HEART WITHOUT ANGINA PECTORIS: ICD-10-CM

## 2021-06-11 DIAGNOSIS — Z99.89 OSA ON CPAP: ICD-10-CM

## 2021-06-11 DIAGNOSIS — J44.9 CHRONIC OBSTRUCTIVE PULMONARY DISEASE, UNSPECIFIED COPD TYPE (HCC): ICD-10-CM

## 2021-06-11 DIAGNOSIS — I48.19 PERSISTENT ATRIAL FIBRILLATION (HCC): Primary | ICD-10-CM

## 2021-06-11 DIAGNOSIS — I50.9 CONGESTIVE HEART FAILURE, NYHA CLASS 4, UNSPECIFIED CONGESTIVE HEART FAILURE TYPE (HCC): ICD-10-CM

## 2021-06-11 DIAGNOSIS — I25.5 ISCHEMIC CARDIOMYOPATHY: ICD-10-CM

## 2021-06-11 DIAGNOSIS — Z87.891 HISTORY OF TOBACCO ABUSE: ICD-10-CM

## 2021-06-11 DIAGNOSIS — G47.33 OSA ON CPAP: ICD-10-CM

## 2021-06-11 DIAGNOSIS — E78.2 MIXED HYPERLIPIDEMIA: ICD-10-CM

## 2021-06-11 PROCEDURE — 99214 OFFICE O/P EST MOD 30 MIN: CPT | Performed by: INTERNAL MEDICINE

## 2021-06-11 PROCEDURE — G8926 SPIRO NO PERF OR DOC: HCPCS | Performed by: INTERNAL MEDICINE

## 2021-06-11 PROCEDURE — 3017F COLORECTAL CA SCREEN DOC REV: CPT | Performed by: INTERNAL MEDICINE

## 2021-06-11 PROCEDURE — G8427 DOCREV CUR MEDS BY ELIG CLIN: HCPCS | Performed by: INTERNAL MEDICINE

## 2021-06-11 PROCEDURE — 1036F TOBACCO NON-USER: CPT | Performed by: INTERNAL MEDICINE

## 2021-06-11 PROCEDURE — 3023F SPIROM DOC REV: CPT | Performed by: INTERNAL MEDICINE

## 2021-06-11 PROCEDURE — 1123F ACP DISCUSS/DSCN MKR DOCD: CPT | Performed by: INTERNAL MEDICINE

## 2021-06-11 PROCEDURE — G8417 CALC BMI ABV UP PARAM F/U: HCPCS | Performed by: INTERNAL MEDICINE

## 2021-06-11 PROCEDURE — 4040F PNEUMOC VAC/ADMIN/RCVD: CPT | Performed by: INTERNAL MEDICINE

## 2021-06-11 RX ORDER — DULAGLUTIDE 1.5 MG/.5ML
1.5 INJECTION, SOLUTION SUBCUTANEOUS WEEKLY
COMMUNITY

## 2021-06-11 NOTE — PATIENT INSTRUCTIONS
1. Continue Aspirin (can cut in half or take a baby aspirin daily), Plavix, metoprolol, metolazone as needed, ramipril, and torsemide. 2. Bring copy of your labs with you at your next office visit. 3. No cardiac testing warranted at this time.    4. Follow up in 6 months

## 2021-06-11 NOTE — PROGRESS NOTES
[facility]   Cardiac Followup    Referring Provider:  Reynaldo Domínguez MD     Chief Complaint   Patient presents with    3 Month Follow-Up    Coronary Artery Disease    Congestive Heart Failure    Other     short of breath no more than usual      Subjective: Mr Nellie Perdue presents today for cardiology follow up of CAD s/p 2V CABG, CMP, HTN, HLD, CHF; c/o baseline SOB today    History of Present Illness:   Mr. Nellie Perdue is a 73 y.o. male here for routine cardiac f/u. Last OV 3/5/21 He has PMH of HTN, HLD, s/p back surgery 12/12, PVD s/p right fem-pop bypass surgery 10/10, hx CAD s/p 2V CABG in 2010, mild ischemic CM EF=40% Feb 2015, hx MI, severe COPD, and severe CHRISTOFER on Bipap. Carotid study 10/2012  Bilateral <50%. Most recent stress test 4/11 showed moderate perfusion defect involving the inferior wall of the LV from apex to base concerning for ischemia; small perfusion defect anteroseptal wall of the LV apex also concerning for ischemia. LVEF 51%. Note ECHO 10/2012 showed EF of 55% with grade I DD. Admitted 48 days for pneumonia in 10/13 at [facility] for Klebsiella PNA with complications including PEA cardiac arrest and tracheostomy temporarily. Also had 2 DVT in the LLE and placed on coumadin. Most recent LHC/RHC 10/3/14 showed severe native CAD with patent LIMA-LAD and SVG-PDA with no need for PCI. Mean PAP=37mmHg with wedge=25mmHg. He wears 3L O2 qd and uses Bipap during night along with 5L NC oxygen. He did NOT have chest pain prior to CABG and had SOB. Most recent EKG 3/5/21 NSR 93bpm; septal infarct; low voltage; nonspecific ST change (no change 4/15). Follows nephrologist, Dr. Marta Faulkner, in [place]. Most recent ECHO 3/26/21 showed 35-40% EF at [facility] (EF=40%in 8/17). Today he reports being more SOB and Dr. Linette Vyas started him on prednisone. He presents in wheelchair on 3L NC. He has gotten Yessenia Masoud vaccines. Takes medications as prescribed.  Diuretic regimen includes torsemide 40mg BID and metolazone 5mg PRN which he uses 1-2x per week. Tragically he lost his son in 2015 house fire while hospitalized at [facility]. He built a new house in [place]. He also lost 2 of his brothers in 2016    Past Medical History:   has a past medical history of CAD (coronary artery disease), CHF (congestive heart failure) ([facility] Chronic kidney disease, COPD (chronic obstructive pulmonary disease) ([facility] DVT of leg (deep venous thrombosis) ([facility] Hyperlipidemia, Hypertension, MRSA infection within last 3 months, Pneumonia, and Unspecified diseases of blood and blood-forming organs. Surgical History:   has a past surgical history that includes Cardiac surgery; Leg Surgery; Coronary artery bypass graft; Diagnostic Cardiac Cath Lab Procedure; Ankle fracture surgery (Left, 2/22/15); fracture surgery; back surgery; and Leg Surgery (2016). Social History:   reports that he quit smoking about 2010. His smoking use included cigarettes. He has a 80.00 pack-year smoking history. He quit smokeless tobacco use about 9 years ago. His smokeless tobacco use included chew. He reports that he does not drink alcohol and does not use drugs. Family History:  family history includes Cancer in his mother; Emphysema in his sister; Heart Failure in his father. Home Medications:  Outpatient Encounter Medications as of 6/11/2021   Medication Sig Dispense Refill    Dulaglutide (TRULICITY) 1.5 EN/0.8CD SOPN Inject 1.5 mg into the skin once a week      predniSONE (DELTASONE) 10 MG tablet Take 40 mg by mouth for 3 days 30 mg for 3 days 20 mg for 3 days 10 mg for 3 days.  30 tablet 0    budesonide-formoterol (SYMBICORT) 160-4.5 MCG/ACT AERO Inhale 2 puffs into the lungs 2 times daily 1 Inhaler 6    torsemide (DEMADEX) 20 MG tablet Take 2 tablets by mouth 2 times daily Take 2 tablets twice daily 90 tablet 3    metOLazone (ZAROXOLYN) 5 MG tablet Take 1 tablet by mouth as needed (swelling) 30 tablet 3  aspirin 325 MG EC tablet Take 1 tablet by mouth daily 90 tablet 3    amitriptyline (ELAVIL) 25 MG tablet Take 25 mg by mouth nightly      allopurinol (ZYLOPRIM) 300 MG tablet allopurinol 300 mg tablet      calcitRIOL (ROCALTROL) 0.25 MCG capsule Take 0.25 mcg by mouth daily      ipratropium-albuterol (DUONEB) 0.5-2.5 (3) MG/3ML SOLN nebulizer solution Inhale 3 mLs into the lungs every 6 hours as needed for Shortness of Breath DX COPD J44.9 120 vial 6    Cholecalciferol (VITAMIN D3) 2000 units CAPS Take by mouth daily      Docusate Calcium (STOOL SOFTENER PO) Take by mouth daily      polyethylene glycol (GLYCOLAX) packet Take 17 g by mouth daily as needed for Constipation      insulin glargine (BASAGLAR KWIKPEN) 100 UNIT/ML injection pen Basaglar KwikPen U-100 Insulin 100 unit/mL (3 mL) subcutaneous      Ascorbic Acid (VITAMIN C) 500 MG tablet Take 1,000 mg by mouth daily      clopidogrel (PLAVIX) 75 MG tablet Take 75 mg by mouth daily      pantoprazole sodium (PROTONIX) 40 MG PACK packet Take 40 mg by mouth 2 times daily (before meals)      atorvastatin (LIPITOR) 40 MG tablet Take 1 tablet by mouth daily 30 tablet 3    ferrous sulfate 325 (65 FE) MG tablet Take 325 mg by mouth daily (with breakfast)      oxyCODONE HCl ER 10 MG CR tablet Take 1 tablet by mouth every 12 hours. 4 tablet 0    metoprolol (TOPROL-XL) 25 MG XL tablet Take 25 mg by mouth daily.  albuterol (PROVENTIL HFA;VENTOLIN HFA) 108 (90 BASE) MCG/ACT inhaler Inhale 2 puffs into the lungs every 6 hours as needed for Wheezing.  potassium chloride SA (K-DUR;KLOR-CON) 10 MEQ tablet Take 10 mEq by mouth 2 times daily       metFORMIN (GLUCOPHAGE) 500 MG tablet Take 500 mg by mouth 3 times daily (Patient not taking: Reported on 6/3/2021)      ramipril (ALTACE) 1.25 MG capsule Take 1.25 mg by mouth daily (Patient not taking: Reported on 6/3/2021)       No facility-administered encounter medications on file as of 6/11/2021. Allergies:  Patient has no known allergies. Review of Systems:   · Constitutional: there has been no unanticipated weight loss. There's been no change in energy level, sleep pattern, or activity level. · Eyes: No visual changes or diplopia. No scleral icterus. · ENT: No Headaches, hearing loss or vertigo. No mouth sores or sore throat. · Cardiovascular: Reviewed in HPI  · Respiratory: No cough or wheezing, no sputum production. No hematemesis. · Gastrointestinal: No abdominal pain, appetite loss, blood in stools. No change in bowel or bladder habits. · Genitourinary: No dysuria, trouble voiding, or hematuria. · Musculoskeletal:  No gait disturbance, weakness or joint complaints. · Integumentary: No rash or pruritis. · Neurological: No headache, diplopia, change in muscle strength, numbness or tingling. No change in gait, balance, coordination, mood, affect, memory, mentation, behavior. · Psychiatric: No anxiety, no depression. · Endocrine: No malaise, fatigue or temperature intolerance. No excessive thirst, fluid intake, or urination. No tremor. · Hematologic/Lymphatic: No abnormal bruising or bleeding, blood clots or swollen lymph nodes. · Allergic/Immunologic: No nasal congestion or hives. Physical Examination:    Vitals:    06/11/21 1442   BP: 132/60   Pulse: 86   Temp: 97.7 °F (36.5 °C)   SpO2: 93%        Constitutional and General Appearance: NAD; obese in wheelchair  Respiratory:  · Normal excursion and expansion without use of accessory muscles  · Resp Auscultation: soft crackles bibasilar R>L otherwise normal breath sounds  Cardiovascular:  · The apical impulses not displaced  · Heart tones are crisp and normal  · Cervical veins are not engorged  · The carotid upstroke is normal in amplitude and contour without delay or bruit  · Normal S1S2, No S3, ? soft LEFTY, RRR  · Peripheral pulses are symmetrical and full  · There is no clubbing, cyanosis of the extremities.   · Firm 1+ BLE x 2:  See #1 above. 5. LE edema:  Improved from last OV. Chronic issue. Will continue demadex 40 BID and metolazone 5mg PRN maximum 3x per week. Note he follows nephrologist, Dr. Yaa Baptiste, in [place]. Plan:  1. Continue Aspirin (can cut in half or take a baby aspirin daily), Plavix, metoprolol, metolazone as needed, ramipril, and torsemide. 2. Bring copy of your labs with you at your next office visit. 3. No cardiac testing warranted at this time. 4. Follow up in 6 months    This note was scribed in the presence of Riccardo Senior MD by Carlos Weinstein RN. I, Dr. Riccardo Senior, personally performed the services described in this documentation, as scribed by the above signed scribe in my presence. It is both accurate and complete to my knowledge. I agree with the details independently gathered by the clinical support staff, while the remaining scribed note accurately describes my personal service to the patient. Cost of prescription medications and patient compliance have been reviewed with patient. All questions answered. Thank you for allowing me to participate in the care of this individual.    Erling Sicard.  Aletha Esposito M.D., [facility]

## 2021-07-01 ENCOUNTER — TELEPHONE (OUTPATIENT)
Dept: CARDIOLOGY CLINIC | Age: 71
End: 2021-07-01

## 2021-07-01 NOTE — TELEPHONE ENCOUNTER
Intermediate risk clinically for low risk procedure. Proceed as planned. OK to hold plavix. Ideally would recommend continuing baby aspirin but if GI doc insists on stopping both then minimize time off blood thinners. Thanks.

## 2021-07-01 NOTE — TELEPHONE ENCOUNTER
Robertokelley Atkins, 1950    Cardiac Risk Assessment    What type of procedure are you having? EGD/COLONOSCOPY WITH MAC    When is your procedure scheduled for?  7.15.21    Medications to be stopped. REQUESTING ASA AND PLAVIX TO BE STOPPED 5 DAYS PRIOR TO PROCEDURE. IS THIS OK? What physician is performing your procedure? DR. Maura Cortes [facility]    Phone Number:   [phone]    Fax number to send the letter:   [phone]    LAST OV 6.11.21  Assessment:      1. CAD (coronary artery disease and mild ischemic cardiomyopathy:  Most recent LHC/RHC 10/3/14 showed severe native CAD with patent LIMA-LAD and SVG-PDA with no need for PCI. Most recent ECHO 8/22/17 showed mildly weakened EF 40%. Continue ramipril and toprol XL for LV dysfcn. There are no concerning symptoms for angina currently. Continue DAPT given PVD, CAD, and diabetic making higher risk for CV events.       2. Hyperlipidemia: I personally reviewed most recent labs from 11/21/18  showing well controlled lipids LDL 44 except chronically, mildly low HDL 29 and will continue current medical regimen. He has labs drawn in [facility] and no recent labs in [facility] system. I will check labs and adjust accordingly.      3. HTN (hypertension): Well controlled and will continue current medical regimen. 4. S/P CABG x 2:  See #1 above.         5. LE edema:  Improved from last OV. Chronic issue. Will continue demadex 40 BID and metolazone 5mg PRN maximum 3x per week. Note he follows nephrologist, Dr. Aye Small, in [address] [place] [address]:  1. Continue Aspirin (can cut in half or take a baby aspirin daily), Plavix, metoprolol, metolazone as needed, ramipril, and torsemide. 2. Bring copy of your labs with you at your next office visit. 3. No cardiac testing warranted at this time.    4. Follow up in 6 months

## 2021-07-02 ENCOUNTER — TELEPHONE (OUTPATIENT)
Dept: PULMONOLOGY | Age: 71
End: 2021-07-02

## 2021-07-02 NOTE — TELEPHONE ENCOUNTER
Letter printed and faxed to number provided. Called pt and also let him know that since he is on Plavix then all he needs to take is the 81mg of ASA as well.  Mario GARRISON

## 2021-07-08 ENCOUNTER — OFFICE VISIT (OUTPATIENT)
Dept: PULMONOLOGY | Age: 71
End: 2021-07-08
Payer: MEDICARE

## 2021-07-08 VITALS
HEART RATE: 91 BPM | BODY MASS INDEX: 44.1 KG/M2 | DIASTOLIC BLOOD PRESSURE: 62 MMHG | SYSTOLIC BLOOD PRESSURE: 125 MMHG | OXYGEN SATURATION: 91 % | HEIGHT: 71 IN | TEMPERATURE: 94.5 F | WEIGHT: 315 LBS

## 2021-07-08 DIAGNOSIS — J96.11 CHRONIC HYPOXEMIC RESPIRATORY FAILURE (HCC): ICD-10-CM

## 2021-07-08 DIAGNOSIS — Z01.818 PREOPERATIVE CLEARANCE: Primary | ICD-10-CM

## 2021-07-08 DIAGNOSIS — J44.9 STAGE 3 SEVERE COPD BY GOLD CLASSIFICATION (HCC): ICD-10-CM

## 2021-07-08 PROCEDURE — G8926 SPIRO NO PERF OR DOC: HCPCS | Performed by: INTERNAL MEDICINE

## 2021-07-08 PROCEDURE — 4040F PNEUMOC VAC/ADMIN/RCVD: CPT | Performed by: INTERNAL MEDICINE

## 2021-07-08 PROCEDURE — 3017F COLORECTAL CA SCREEN DOC REV: CPT | Performed by: INTERNAL MEDICINE

## 2021-07-08 PROCEDURE — G8427 DOCREV CUR MEDS BY ELIG CLIN: HCPCS | Performed by: INTERNAL MEDICINE

## 2021-07-08 PROCEDURE — 99214 OFFICE O/P EST MOD 30 MIN: CPT | Performed by: INTERNAL MEDICINE

## 2021-07-08 PROCEDURE — 3023F SPIROM DOC REV: CPT | Performed by: INTERNAL MEDICINE

## 2021-07-08 PROCEDURE — G8417 CALC BMI ABV UP PARAM F/U: HCPCS | Performed by: INTERNAL MEDICINE

## 2021-07-08 PROCEDURE — 1036F TOBACCO NON-USER: CPT | Performed by: INTERNAL MEDICINE

## 2021-07-08 PROCEDURE — 1123F ACP DISCUSS/DSCN MKR DOCD: CPT | Performed by: INTERNAL MEDICINE

## 2021-07-08 RX ORDER — PREDNISONE 10 MG/1
TABLET ORAL
Qty: 30 TABLET | Refills: 0 | Status: SHIPPED | OUTPATIENT
Start: 2021-07-08 | End: 2021-07-20

## 2021-07-08 ASSESSMENT — SLEEP AND FATIGUE QUESTIONNAIRES
HOW LIKELY ARE YOU TO NOD OFF OR FALL ASLEEP WHILE SITTING AND TALKING TO SOMEONE: 0
HOW LIKELY ARE YOU TO NOD OFF OR FALL ASLEEP WHILE WATCHING TV: 1
HOW LIKELY ARE YOU TO NOD OFF OR FALL ASLEEP WHEN YOU ARE A PASSENGER IN A CAR FOR AN HOUR WITHOUT A BREAK: 0
HOW LIKELY ARE YOU TO NOD OFF OR FALL ASLEEP WHILE SITTING QUIETLY AFTER LUNCH WITHOUT ALCOHOL: 1
HOW LIKELY ARE YOU TO NOD OFF OR FALL ASLEEP IN A CAR, WHILE STOPPED FOR A FEW MINUTES IN TRAFFIC: 0
NECK CIRCUMFERENCE (INCHES): 22
HOW LIKELY ARE YOU TO NOD OFF OR FALL ASLEEP WHILE LYING DOWN TO REST IN THE AFTERNOON WHEN CIRCUMSTANCES PERMIT: 1
HOW LIKELY ARE YOU TO NOD OFF OR FALL ASLEEP WHILE SITTING INACTIVE IN A PUBLIC PLACE: 0
ESS TOTAL SCORE: 4
HOW LIKELY ARE YOU TO NOD OFF OR FALL ASLEEP WHILE SITTING AND READING: 1

## 2021-07-08 NOTE — PROGRESS NOTES
[facility]  TELEHEALTH EVALUATION: Service performed was Audio (During RGUM-32 public health emergency) and not a face-to-face visit         CHIEF COMPLAINT: preop clearance for coloscopy       HPI:   Patient is scheduled for Thursday next week colonoscopy for follow up polyps   Feels about the same   Uses Neb 2 times/day   No cough or sputum   O2- 3LPM - sat is 92%   Uses BiPAP every night, 5 hrs/night. Uses full face mask. No nodding off when driving. Goes to bed 2 am and wakes 10 am. Sleep onset of 30 min. ESS 4. Past Medical History:   Diagnosis Date    CAD (coronary artery disease)     MI    [facility]est[facility] failure) ([facility])     Chronic kidney disease     [facility] [address]     Diabetes mellitus [facility] [facility]     DVT of leg (deep venous thrombosis) ([facility] [address]     Hyperlipidemia     Hypertension     MRSA infection within last 3 months     Pneumonia     Unspecified diseases of blood and blood-forming organs        Past Surgical History:        Procedure Laterality Date    ANKLE FRACTURE SURGERY Left 2/22/15    ORIF; LEFT ANKLE OPEN REDUCTION INTERNAL FIXATION    BACK SURGERY      CARDIAC SURGERY      2 VESSEL CABG    CORONARY ARTERY BYPASS GRAFT      DIAGNOSTIC CARDIAC CATH LAB PROCEDURE      FRACTURE SURGERY      LEG SURGERY      VEIN REMOVED FROM L  LEG  PLACED IN RT LEG    LEG SURGERY  2016    [facility]       Allergies:  has No Known Allergies. Social History:    TOBACCO:   reports that he quit smoking about 11 years ago. His smoking use included cigarettes. He has a 80.00 pack-year smoking history. He quit smokeless tobacco use about 9 years ago. His smokeless tobacco use included chew. ETOH:   reports no history of alcohol use.       Family History:       Problem Relation Age of Onset    Cancer Mother     Heart Failure Father     Emphysema Sister        Current Medications:    Current Outpatient Medications:     predniSONE (DELTASONE) 10 MG tablet, Take 40 mg by mouth for 3 days 30 mg for 3 days 20 mg for 3 days 10 mg for 3 days. , Disp: 30 tablet, Rfl: 0    Dulaglutide (TRULICITY) 1.5 SA/9.5VA SOPN, Inject 1.5 mg into the skin once a week, Disp: , Rfl:     budesonide-formoterol (SYMBICORT) 160-4.5 MCG/ACT AERO, Inhale 2 puffs into the lungs 2 times daily, Disp: 1 Inhaler, Rfl: 6    torsemide (DEMADEX) 20 MG tablet, Take 2 tablets by mouth 2 times daily Take 2 tablets twice daily, Disp: 90 tablet, Rfl: 3    metOLazone (ZAROXOLYN) 5 MG tablet, Take 1 tablet by mouth as needed (swelling), Disp: 30 tablet, Rfl: 3    aspirin 325 MG EC tablet, Take 1 tablet by mouth daily (Patient taking differently: Take 81 mg by mouth daily ), Disp: 90 tablet, Rfl: 3    amitriptyline (ELAVIL) 25 MG tablet, Take 25 mg by mouth nightly, Disp: , Rfl:     allopurinol (ZYLOPRIM) 300 MG tablet, allopurinol 300 mg tablet, Disp: , Rfl:     calcitRIOL (ROCALTROL) 0.25 MCG capsule, Take 0.25 mcg by mouth daily, Disp: , Rfl:     ipratropium-albuterol (DUONEB) 0.5-2.5 (3) MG/3ML SOLN nebulizer solution, Inhale 3 mLs into the lungs every 6 hours as needed for Shortness of Breath DX COPD J44.9, Disp: 120 vial, Rfl: 6    Cholecalciferol (VITAMIN D3) 2000 units CAPS, Take by mouth daily, Disp: , Rfl:     polyethylene glycol (GLYCOLAX) packet, Take 17 g by mouth daily as needed for Constipation, Disp: , Rfl:     insulin glargine (BASAGLAR KWIKPEN) 100 UNIT/ML injection pen, Basaglar KwikPen U-100 Insulin 100 unit/mL (3 mL) subcutaneous, Disp: , Rfl:     Ascorbic Acid (VITAMIN C) 500 MG tablet, Take 1,000 mg by mouth daily, Disp: , Rfl:     clopidogrel (PLAVIX) 75 MG tablet, Take 75 mg by mouth daily, Disp: , Rfl:     pantoprazole sodium (PROTONIX) 40 MG PACK packet, Take 40 mg by mouth 2 times daily (before meals), Disp: , Rfl:     ramipril (ALTACE) 1.25 MG capsule, Take 1.25 mg by mouth daily , Disp: , Rfl:     atorvastatin (LIPITOR) 40 MG tablet, Take 1 tablet by mouth daily, Disp: 30 tablet, Rfl: 3    ferrous sulfate 325 (65 FE) MG tablet, Take 325 mg by mouth daily (with breakfast), Disp: , Rfl:     oxyCODONE HCl ER 10 MG CR tablet, Take 1 tablet by mouth every 12 hours. , Disp: 4 tablet, Rfl: 0    metoprolol (TOPROL-XL) 25 MG XL tablet, Take 25 mg by mouth daily. , Disp: , Rfl:     albuterol (PROVENTIL HFA;VENTOLIN HFA) 108 (90 BASE) MCG/ACT inhaler, Inhale 2 puffs into the lungs every 6 hours as needed for Wheezing., Disp: , Rfl:     potassium chloride SA (K-DUR;KLOR-CON) 10 MEQ tablet, Take 10 mEq by mouth 2 times daily , Disp: , Rfl:     Docusate Calcium (STOOL SOFTENER PO), Take by mouth daily, Disp: , Rfl:     metFORMIN (GLUCOPHAGE) 500 MG tablet, Take 500 mg by mouth 3 times daily (Patient not taking: Reported on 6/3/2021), Disp: , Rfl:       Objective:   PHYSICAL EXAM:    Blood pressure 125/62, pulse 91, temperature 94.5 °F (34.7 °C), height 5' 11\" (1.803 m), weight (!) 335 lb (152 kg), SpO2 91 %.' on 3LPM   Gen: No distress. Eyes: PERRL. No sclera icterus. No conjunctival injection. ENT: No discharge. Pharynx clear. Neck: Trachea midline. No obvious mass. Resp: No accessory muscle use. No crackles. Few wheezes. No rhonchi. No dullness on percussion. Good air entry. CV: Regular rate. Regular rhythm. No murmur or rub. No edema. GI: Non-tender. Non-distended. No hernia. Skin: Warm and dry. No nodule on exposed extremities. Lymph: No cervical LAD. No supraclavicular LAD. M/S: No cyanosis. No joint deformity. No clubbing. Neuro: Awake. Alert. Moves all four extremities. Psych: Oriented x 3. No anxiety. DATA reviewed by me:     CT chest 5/5/18   No suspicious pulmonary nodules. Continued active surveillance is  recommended including a low-dose lung cancer screening chest CT examination  in 12 months, as referenced below.     CT chest 5/28/2020  Unchanged solid subcentimeter pulmonary nodules    CT chest 6/3/21    Unchanged solid subcentimeter pulmonary nodules        Split-night 1/31/14 AHI 69 CPAP 15 some ear H2O with 3 L O2    BiPAP data 12/18-01/16 2019 reviewed by me. 6-7 hrs/night with 96% compliance and AHI of  0.2. BiPAP 17/12  BiPAP data 04/09-05/08 2019 reviewed by me. 4-5 hrs/night with 70% compliance and AHI of  0.1. BiPAP 17/12      Assessment:       · Preoperative clearance for colonoscopy  · Severe COPD   · Chronic hypoxemic respiratory failure  · Cor pulmonale  · Pulmonary nodules stable on repeat CT 6/3/2021  · Severe CHRISTOFER on BiPAP 17/12 cmH2O with 5LPM. Heated tubing and full face mask   · History of Tracheostomy 10/2013  · CHF (EF 40%) CAD post CABG  · 1-2 ppd for 45 years- quit 2010- unremarkable LDCT 5/9/2019  · Wheel chair since 2015         Plan:       · Continue Symbicort 160/4.5 2 puffs BID and DuoNeb QID PRN   · 3L O2 rest and 5L exertion. Advised to titrate O2 using her pulse oximeter- target O2 sat 90-92%. · Patient is up to date with Covid, pneumococcal vaccine and influenza vaccine   · CT chest, low dose protocol, screening for lung cancer May 2022. · Continue BiPAP 6-8 hrs at night and during naps. · Replacement of mask, tubing, head straps every 3-6 months or sooner if damaged. · Follow up BIPAP compliance and pressure adjustment if needed  · Sleep hygiene  · Avoid sedatives, alcohol and caffeinated drinks at bed time. · No driving motorized vehicles or operating heavy machinery while fatigue, drowsy or sleepy. · Weight loss is also recommended as a long-term intervention. · Follow up in 6 months         Upon examination and discussion I have determined that, from a pulmonary standpoint, patient is clear for surgery. Patient has increased risk of postoperative pulmonary complications based on age and history of COPD and CHRISTOFER.   Perioperative pulmonary complications were discussed with the patient including atelectasis, infection, prolonged mechanical ventilation, and exacerbation of underlying chronic lung disease. To minimize the perioperative pulmonary complications I recommend:   · Prednisone taper to optimize pulmonary function  · Continue O2, inhaled bronchodilators and BiPAP perioperatively-advised to take his inhalers and BiPAP with him. · Delay elective surgery and antibiotics if respiratory infection present    · Deep breathing exercises or incentive spirometry  · Minimize duration of anesthesia    · Early ambulation and DVT prophylaxis.

## 2021-09-03 ENCOUNTER — TELEPHONE (OUTPATIENT)
Dept: CARDIOLOGY CLINIC | Age: 71
End: 2021-09-03

## 2021-12-16 ENCOUNTER — VIRTUAL VISIT (OUTPATIENT)
Dept: PULMONOLOGY | Age: 71
End: 2021-12-16
Payer: MEDICARE

## 2021-12-16 DIAGNOSIS — Z98.890 HX OF TRACHEOSTOMY: ICD-10-CM

## 2021-12-16 DIAGNOSIS — I27.81 COR PULMONALE (CHRONIC) (HCC): ICD-10-CM

## 2021-12-16 DIAGNOSIS — J44.9 COPD, SEVERE (HCC): Primary | ICD-10-CM

## 2021-12-16 DIAGNOSIS — G47.33 SEVERE OBSTRUCTIVE SLEEP APNEA: ICD-10-CM

## 2021-12-16 DIAGNOSIS — J96.11 CHRONIC HYPOXEMIC RESPIRATORY FAILURE (HCC): ICD-10-CM

## 2021-12-16 DIAGNOSIS — R91.8 PULMONARY NODULES: ICD-10-CM

## 2021-12-16 PROCEDURE — 99442 PR PHYS/QHP TELEPHONE EVALUATION 11-20 MIN: CPT | Performed by: INTERNAL MEDICINE

## 2021-12-16 RX ORDER — IPRATROPIUM BROMIDE AND ALBUTEROL SULFATE 2.5; .5 MG/3ML; MG/3ML
1 SOLUTION RESPIRATORY (INHALATION) EVERY 6 HOURS PRN
Qty: 120 ML | Refills: 5 | Status: SHIPPED | OUTPATIENT
Start: 2021-12-16

## 2021-12-16 RX ORDER — DOXYCYCLINE HYCLATE 100 MG/1
100 CAPSULE ORAL 2 TIMES DAILY
Qty: 10 CAPSULE | Refills: 0 | Status: SHIPPED | OUTPATIENT
Start: 2021-12-16 | End: 2021-12-21

## 2021-12-16 RX ORDER — BUDESONIDE AND FORMOTEROL FUMARATE DIHYDRATE 160; 4.5 UG/1; UG/1
2 AEROSOL RESPIRATORY (INHALATION) 2 TIMES DAILY
Qty: 10.2 G | Refills: 5 | Status: SHIPPED | OUTPATIENT
Start: 2021-12-16

## 2021-12-16 RX ORDER — PREDNISONE 10 MG/1
TABLET ORAL
Qty: 30 TABLET | Refills: 0 | Status: SHIPPED | OUTPATIENT
Start: 2021-12-16 | End: 2021-12-28

## 2021-12-16 NOTE — PATIENT INSTRUCTIONS
Remember to bring all pulmonary medications to your next appointment with the office. Please keep all of your future appointments scheduled by [address], [facility] office. Out of respect for other patients and providers, you may be asked to reschedule your appointment if you arrive later than your scheduled appointment time. Appointments cancelled less than 24hrs in advance will be considered a no show. Patients with three missed appointments within 1 year or four missed appointments within 2 years can be dismissed from the practice.

## 2021-12-16 NOTE — PROGRESS NOTES
P Pulmonary, Critical Care [facility] Sleep Specialists    TELEHEALTH EVALUATION: Service performed was Audio (During [facility]-18 public health emergency) and not a face-to-face visit       CHIEF COMPLAINT: COPD      HPI:   Treated last week for COPD AE- completed steroids and Abx  Feeling better   Uses Neb 2 times/day   No cough   O2- 3.5 LPM - sat is 91%   Uses BiPAP every night, 5-8 hrs/night. Uses humidifier. Uses full face mask. No nodding off when driving. Goes to bed 2 am and wakes 10-11 am. Sleep onset of 30 min. Past Medical History:   Diagnosis Date    CAD (coronary artery disease)     MI    CHF (congestive heart failure) (HCC)     Chronic kidney disease     [facility] [address]     Diabetes mellitus [facility] [address]     DVT of leg (deep venous thrombosis) ([facility] [address]     Hyperlipidemia     Hypertension     MRSA infection within last 3 months     Pneumonia     Unspecified diseases of blood and blood-forming organs        Past Surgical History:        Procedure Laterality Date    ANKLE FRACTURE SURGERY Left 2/22/15    ORIF; LEFT ANKLE OPEN REDUCTION INTERNAL FIXATION    BACK SURGERY      CARDIAC SURGERY      2 VESSEL CABG    CORONARY ARTERY BYPASS GRAFT      DIAGNOSTIC CARDIAC CATH LAB PROCEDURE      FRACTURE SURGERY      LEG SURGERY      VEIN REMOVED FROM L  LEG  PLACED IN RT LEG    LE[facility] SURGERY  2016    [facility]       Allergies:  has No Known Allergies. Social History:    TOBACCO:   reports that he quit smoking about 11 years ago. His smoking use included cigarettes. He has a 80.00 pack-year smoking history. He quit smokeless tobacco use about 9 years ago. His smokeless tobacco use included chew. ETOH:   reports no history of alcohol use.       Family History:       Problem Relation Age of Onset    Cancer Mother     Heart Failure Father     Emphysema Sister        Current Medications:    Current Outpatient Medications:    325 (65 FE) MG tablet, Take 325 mg by mouth daily (with breakfast), Disp: , Rfl:     oxyCODONE HCl ER 10 MG CR tablet, Take 1 tablet by mouth every 12 hours. , Disp: 4 tablet, Rfl: 0    metoprolol (TOPROL-XL) 25 MG XL tablet, Take 25 mg by mouth daily. , Disp: , Rfl:     albuterol (PROVENTIL HFA;VENTOLIN HFA) 108 (90 BASE) MCG/ACT inhaler, Inhale 2 puffs into the lungs every 6 hours as needed for Wheezing., Disp: , Rfl:     potassium chloride SA (K-DUR;KLOR-CON) 10 MEQ tablet, Take 10 mEq by mouth 2 times daily , Disp: , Rfl:     Dulaglutide (TRULICITY) 1.5 XI/1.1CV SOPN, Inject 1.5 mg into the skin once a week (Patient not taking: Reported on 12/16/2021), Disp: , Rfl:       Objective:   PHYSICAL EXAM:    Telephone visit not able to obtain physical exam               DATA reviewed by me:     CT chest 5/5/18   No suspicious pulmonary nodules. Continued active surveillance is  recommended including a low-dose lung cancer screening chest CT examination  in 12 months, as referenced below. CT chest 5/28/2020  Unchanged solid subcentimeter pulmonary nodules    CT chest 6/3/21    Unchanged solid subcentimeter pulmonary nodules        Split-night 1/31/14 AHI 69 CPAP 15 some ear H2O with 3 L O2    BiPAP data 12/18-01/16 2019 reviewed by me. 6-7 hrs/night with 96% compliance and AHI of  0.2. BiPAP 17/12  BiPAP data 04/09-05/08 2019 reviewed by me. 4-5 hrs/night with 70% compliance and AHI of  0.1. BiPAP 17/12      Assessment:       · Severe COPD  · Chronic hypoxemic respiratory failure  · Cor pulmonale  · Pulmonary nodules- stable on repeat CT 6/3/2021  · Severe CHRISTOFER on BiPAP 17/12 cmH2O with 5LPM. Heated tubing and full face mask.   Compliant per patient's report  · History of Tracheostomy 10/2013  · CHF (EF 40%) CAD post CABG  · 1-2 ppd for 45 years- quit 2010- unremarkable LDCT 5/9/2019  · Wheel chair since 2015         Plan:       · Continue Symbicort 160/4.5 2 puffs BID and DuoNeb QID PRN   · 3L O2 rest and 5L exertion. Advised to titrate O2 using her pulse oximeter- target O2 sat 90-92%. · Patient is up to date with Covid, pneumococcal vaccine and influenza vaccine   · CT chest, low dose protocol, screening for lung cancer May 2022. · Prednisone taper and Doxycycline 100 mg BID x 5 days for COPD AE self management if needed. · Continue BiPAP 6-8 hrs at night and during naps. · Follow up in 6 months       Андрей James is a 73 y.o. male evaluated via telephone on 12/16/2021. Consent:  He and/or health care decision maker is aware that that he may receive a bill for this telephone service, depending on his insurance coverage, and has provided verbal consent to proceed: Yes       Documentation:  I communicated with the patient and/or health care decision maker about: See above   Details of this discussion including any medical advice provided: See above       I Affirm this is a Patient Initiated Episode with an Established Patient who has not had a related appointment within my department in the past 7 days or scheduled within the next 24 hours.     Total Time: 11-20 minutes     Note: not billable if this call serves to triage the patient into an appointment for the relevant concern      Jeremi Weller MD

## 2022-01-07 ENCOUNTER — TELEPHONE (OUTPATIENT)
Dept: PULMONOLOGY | Age: 72
End: 2022-01-07

## 2022-01-07 RX ORDER — PREDNISONE 10 MG/1
TABLET ORAL
Qty: 30 TABLET | Refills: 0 | Status: SHIPPED | OUTPATIENT
Start: 2022-01-07

## 2022-01-07 RX ORDER — DOXYCYCLINE HYCLATE 100 MG
100 TABLET ORAL 2 TIMES DAILY
Qty: 14 TABLET | Refills: 0 | Status: SHIPPED | OUTPATIENT
Start: 2022-01-07 | End: 2022-01-14

## 2022-02-07 ENCOUNTER — OFFICE VISIT (OUTPATIENT)
Dept: CARDIOLOGY CLINIC | Age: 72
End: 2022-02-07
Payer: MEDICARE

## 2022-02-07 VITALS
DIASTOLIC BLOOD PRESSURE: 68 MMHG | HEART RATE: 95 BPM | HEIGHT: 71 IN | SYSTOLIC BLOOD PRESSURE: 134 MMHG | OXYGEN SATURATION: 95 % | WEIGHT: 315 LBS | TEMPERATURE: 98.3 F | BODY MASS INDEX: 44.1 KG/M2

## 2022-02-07 DIAGNOSIS — Z87.891 HISTORY OF TOBACCO ABUSE: ICD-10-CM

## 2022-02-07 DIAGNOSIS — I25.5 ISCHEMIC CARDIOMYOPATHY: ICD-10-CM

## 2022-02-07 DIAGNOSIS — I25.10 CORONARY ARTERY DISEASE INVOLVING NATIVE CORONARY ARTERY OF NATIVE HEART WITHOUT ANGINA PECTORIS: ICD-10-CM

## 2022-02-07 DIAGNOSIS — I10 PRIMARY HYPERTENSION: Primary | ICD-10-CM

## 2022-02-07 DIAGNOSIS — E78.2 MIXED HYPERLIPIDEMIA: ICD-10-CM

## 2022-02-07 DIAGNOSIS — R06.02 SOB (SHORTNESS OF BREATH): ICD-10-CM

## 2022-02-07 DIAGNOSIS — I50.9 CONGESTIVE HEART FAILURE, NYHA CLASS 4, UNSPECIFIED CONGESTIVE HEART FAILURE TYPE (HCC): ICD-10-CM

## 2022-02-07 DIAGNOSIS — Z79.899 MEDICATION MANAGEMENT: ICD-10-CM

## 2022-02-07 DIAGNOSIS — I48.19 PERSISTENT ATRIAL FIBRILLATION (HCC): ICD-10-CM

## 2022-02-07 PROCEDURE — G8484 FLU IMMUNIZE NO ADMIN: HCPCS | Performed by: INTERNAL MEDICINE

## 2022-02-07 PROCEDURE — 1036F TOBACCO NON-USER: CPT | Performed by: INTERNAL MEDICINE

## 2022-02-07 PROCEDURE — G8427 DOCREV CUR MEDS BY ELIG CLIN: HCPCS | Performed by: INTERNAL MEDICINE

## 2022-02-07 PROCEDURE — 3017F COLORECTAL CA SCREEN DOC REV: CPT | Performed by: INTERNAL MEDICINE

## 2022-02-07 PROCEDURE — 1123F ACP DISCUSS/DSCN MKR DOCD: CPT | Performed by: INTERNAL MEDICINE

## 2022-02-07 PROCEDURE — 4040F PNEUMOC VAC/ADMIN/RCVD: CPT | Performed by: INTERNAL MEDICINE

## 2022-02-07 PROCEDURE — 99214 OFFICE O/P EST MOD 30 MIN: CPT | Performed by: INTERNAL MEDICINE

## 2022-02-07 PROCEDURE — G8417 CALC BMI ABV UP PARAM F/U: HCPCS | Performed by: INTERNAL MEDICINE

## 2022-02-07 RX ORDER — ASPIRIN 81 MG/1
81 TABLET ORAL DAILY
Qty: 90 TABLET | Refills: 1 | COMMUNITY
Start: 2022-02-07

## 2022-02-07 RX ORDER — POTASSIUM CHLORIDE 20 MEQ/1
20 TABLET, EXTENDED RELEASE ORAL 3 TIMES DAILY
Status: SHIPPED | COMMUNITY
Start: 2022-02-07

## 2022-02-07 NOTE — PROGRESS NOTES
[address]   Cardiac Followup    Referring Provider:  Shady Parson MD     No chief complaint on file. Subjective: Mr Miracle Tamez presents today for cardiology follow up of CAD s/p 2V CABG, CMP, HTN, HLD, CHF; c/o ***    History of Present Illness:   Mr. Miracle Tamez is a 73 y.o. male here for routine cardiac f/u. Last OV 3/5/21 He has PMH of HTN, HLD, s/p back surgery 12/12, PVD s/p right fem-pop bypass surgery 10/10, hx CAD s/p 2V CABG in 2010, mild ischemic CM EF=40% Feb 2015, hx MI, severe COPD, and severe CHRISTOFER on Bipap. Carotid study 10/2012  Bilateral <50%. Most recent stress test 4/11 showed moderate perfusion defect involving the inferior wall of the LV from apex to base concerning for ischemia; small perfusion defect anteroseptal wall of the LV apex also concerning for ischemia. LVEF 51%. Note ECHO 10/2012 showed EF of 55% with grade I DD. Admitted 48 days for pneumonia in 10/13 at [facility] for Klebsiella PNA with complications including PEA cardiac arrest and tracheostomy temporarily. Also had 2 DVT in the LLE and placed on coumadin. Most recent LHC/RHC 10/3/14 showed severe native CAD with patent LIMA-LAD and SVG-PDA with no need for PCI. Mean PAP=37mmHg with wedge=25mmHg. He wears 3L O2 qd and uses Bipap during night along with 5L NC oxygen. He did NOT have chest pain prior to CABG and had SOB. Most recent EKG 3/5/21 NSR 93bpm; septal infarct; low voltage; nonspecific ST change (no change 4/15). Follows nephrologist, Dr. Disha Pugh, in [place]. Most recent ECHO 3/26/21 showed 35-40% EF at [facility] (EF=40%in 8/17).     Today, he reports ***     Past Medical History:   has a past medical history of CAD (coronary artery disease), CHF (congestive heart failure) ([facility] Chronic kidney disease, COPD (chronic obstructive pulmonary disease) ([facility] [address] Diabetes mellitus ([facility] [address] [facility] leg [facility]deep venous thrombosis) [facility] Hyperlipidemia, Hypertension, MRSA infection within last 3 months, Pneumonia, and Unspecified diseases of blood and blood-forming organs. Surgical History:   has a past surgical history that includes Cardiac surgery; Leg Surgery; Coronary artery bypass graft; Diagnostic Cardiac Cath Lab Procedure; Ankle fracture surgery (Left, 2/22/15); fracture surgery; back surgery; and Leg Surgery (2016). Social History:   reports that he quit smoking about 2010. His smoking use included cigarettes. He has a 80.00 pack-year smoking history. He quit smokeless tobacco use about 9 years ago. His smokeless tobacco use included chew. He reports that he does not drink alcohol and does not use drugs. Family History:  family history includes Cancer in his mother; Emphysema in his sister; Heart Failure in his father.      Home Medications:  Outpatient Encounter Medications as of 2/7/2022   Medication Sig Dispense Refill    predniSONE (DELTASONE) 10 MG tablet 3 tabs daily for 10 days 30 tablet 0    budesonide-formoterol (SYMBICORT) 160-4.5 MCG/ACT AERO Inhale 2 puffs into the lungs 2 times daily 10.2 g 5    ipratropium-albuterol (DUONEB) 0.5-2.5 (3) MG/3ML SOLN nebulizer solution Inhale 3 mLs into the lungs every 6 hours as needed for Shortness of Breath DX COPD J44.9 120 mL 5    Dulaglutide (TRULICITY) 1.5 UY/0.9LI SOPN Inject 1.5 mg into the skin once a week (Patient not taking: Reported on 12/16/2021)      torsemide (DEMADEX) 20 MG tablet Take 2 tablets by mouth 2 times daily Take 2 tablets twice daily 90 tablet 3    metOLazone (ZAROXOLYN) 5 MG tablet Take 1 tablet by mouth as needed (swelling) 30 tablet 3    aspirin 325 MG EC tablet Take 1 tablet by mouth daily (Patient taking differently: Take 81 mg by mouth daily ) 90 tablet 3    amitriptyline (ELAVIL) 25 MG tablet Take 25 mg by mouth nightly      allopurinol (ZYLOPRIM) 300 MG tablet allopurinol 300 mg tablet      calcitRIOL (ROCALTROL) 0.25 MCG capsule Take 0.25 mcg by mouth daily      Cholecalciferol (VITAMIN D3) 2000 units CAPS Take by mouth daily      Docusate Calcium (STOOL SOFTENER PO) Take by mouth daily      metFORMIN (GLUCOPHAGE) 500 MG tablet Take 500 mg by mouth 3 times daily       polyethylene glycol (GLYCOLAX) packet Take 17 g by mouth daily as needed for Constipation      insulin glargine (BASAGLAR KWIKPEN) 100 UNIT/ML injection pen Basaglar KwikPen U-100 Insulin 100 unit/mL (3 mL) subcutaneous      Ascorbic Acid (VITAMIN C) 500 MG tablet Take 1,000 mg by mouth daily      clopidogrel (PLAVIX) 75 MG tablet Take 75 mg by mouth daily      pantoprazole sodium (PROTONIX) 40 MG PACK packet Take 40 mg by mouth 2 times daily (before meals)      ramipril (ALTACE) 1.25 MG capsule Take 1.25 mg by mouth daily       atorvastatin (LIPITOR) 40 MG tablet Take 1 tablet by mouth daily 30 tablet 3    ferrous sulfate 325 (65 FE) MG tablet Take 325 mg by mouth daily (with breakfast)      oxyCODONE HCl ER 10 MG CR tablet Take 1 tablet by mouth every 12 hours. 4 tablet 0    metoprolol (TOPROL-XL) 25 MG XL tablet Take 25 mg by mouth daily.  albuterol (PROVENTIL HFA;VENTOLIN HFA) 108 (90 BASE) MCG/ACT inhaler Inhale 2 puffs into the lungs every 6 hours as needed for Wheezing.  potassium chloride SA (K-DUR;KLOR-CON) 10 MEQ tablet Take 10 mEq by mouth 2 times daily        No facility-administered encounter medications on file as of 2/7/2022. Allergies:  Patient has no known allergies. Review of Systems:   · Constitutional: there has been no unanticipated weight loss. There's been no change in energy level, sleep pattern, or activity level. · Eyes: No visual changes or diplopia. No scleral icterus. · ENT: No Headaches, hearing loss or vertigo. No mouth sores or sore throat. · Cardiovascular: Reviewed in HPI  · Respiratory: No cough or wheezing, no sputum production. No hematemesis. · Gastrointestinal: No abdominal pain, appetite loss, blood in stools. No change in bowel or bladder habits.   · Genitourinary: No dysuria, trouble voiding, or hematuria. · Musculoskeletal:  No gait disturbance, weakness or joint complaints. · Integumentary: No rash or pruritis. · Neurological: No headache, diplopia, change in muscle strength, numbness or tingling. No change in gait, balance, coordination, mood, affect, memory, mentation, behavior. · Psychiatric: No anxiety, no depression. · Endocrine: No malaise, fatigue or temperature intolerance. No excessive thirst, fluid intake, or urination. No tremor. · Hematologic/Lymphatic: No abnormal bruising or bleeding, blood clots or swollen lymph nodes. · Allergic/Immunologic: No nasal congestion or hives. Physical Examination:    There were no vitals filed for this visit. Constitutional and General Appearance: NAD; obese in wheelchair  Respiratory:  · Normal excursion and expansion without use of accessory muscles  · Resp Auscultation: soft crackles bibasilar R>L otherwise normal breath sounds  Cardiovascular:  · The apical impulses not displaced  · Heart tones are crisp and normal  · Cervical veins are not engorged  · The carotid upstroke is normal in amplitude and contour without delay or bruit  · Normal S1S2, No S3, ? soft LEFTY, RRR  · Peripheral pulses are symmetrical and full  · There is no clubbing, cyanosis of the extremities.   · Firm 1+ BLE edema; thick skin   · Femoral Arteries: 2+ and equal  · Pedal Pulses: 2+ and equal   Abdomen:  · No masses or tenderness  · Liver/Spleen: No Abnormalities Noted  Neurological/Psychiatric:  · Alert and oriented in all spheres  · Moves all extremities well  · Exhibits normal gait balance and coordination  · No abnormalities of mood, affect, memory, mentation, or behavior are noted    Lab Results   Component Value Date     03/11/2021    K 4.5 03/11/2021    CL 94 (L) 03/11/2021    CO2 38 (H) 03/11/2021    BUN 35 (H) 03/11/2021    CREATININE 1.6 (H) 03/11/2021    GLUCOSE 146 (H) 03/11/2021    CALCIUM 9.5 03/11/2021    PROT 7.1 03/11/2021 LABALBU 3.9 03/11/2021    BILITOT 0.3 03/11/2021    ALKPHOS 71 03/11/2021    AST 12 (L) 03/11/2021    ALT 13 03/11/2021    LABGLOM 43 (A) 03/11/2021    GFRAA 52 (A) 03/11/2021    AGRATIO 1.2 03/11/2021    GLOB 3.2 03/11/2021       Lab Results   Component Value Date    WBC 10.7 03/11/2021    HGB 9.6 (L) 03/11/2021    HCT 30.4 (L) 03/11/2021    MCV 88.6 03/11/2021     03/11/2021       CHOL 3/11/2021   108  TRIG   3/11/2021   165  Lab Results   Component Value Date    CHOL 103 05/29/2015    CHOL 107 10/03/2014     Lab Results   Component Value Date    TRIG 154 (H) 05/29/2015    TRIG 114 10/03/2014     Lab Results   Component Value Date    HDL 26 (L) 03/11/2021    HDL 29 (L) 11/21/2018    HDL 34 (L) 08/22/2017     Lab Results   Component Value Date    LDLCALC 49 03/11/2021    LDLCALC 44 11/21/2018    LDLCALC 47 08/22/2017     Lab Results   Component Value Date    LABVLDL 33 03/11/2021    LABVLDL 37 11/21/2018    LABVLDL 26 08/22/2017    VLDL 31 05/29/2015     Assessment:     1. CAD (coronary artery disease and mild ischemic cardiomyopathy:  Most recent LHC/RHC 10/3/14 showed severe native CAD with patent LIMA-LAD and SVG-PDA with no need for PCI. Most recent ECHO 8/22/17 showed mildly weakened EF 40%. Continue ramipril and toprol XL for LV dysfcn. There are no concerning symptoms for angina currently. Continue DAPT given PVD, CAD, and diabetic making higher risk for CV events. 2. Hyperlipidemia: I personally reviewed most recent labs from 11/21/18  showing well controlled lipids LDL 44 except chronically, mildly low HDL 29 and will continue current medical regimen. He has labs drawn in [facility] and no recent labs in [facility] system. I will check labs and adjust accordingly. 3. HTN (hypertension): Well controlled and will continue current medical regimen. 4. S/P CABG x 2:  See #1 above. 5. LE edema:  Improved from last OV. Chronic issue.  Will continue demadex 40 BID and metolazone 5mg PRN maximum 3x per week. Note he follows nephrologist, Dr. Mehdi Montenegro, in [place]. Plan:  1.      ***    ***    Cost of prescription medications and patient compliance have been reviewed with patient. All questions answered. Thank you for allowing me to participate in the care of this individual.    Don Morrison.  Karyle Skene, M.D., [facility]

## 2022-02-07 NOTE — PATIENT INSTRUCTIONS
Plan:  1. Continue working on getting medications covered so they are more affordable  2. I would like for you to continue taking dual blood thinners   3. Continue to follow with kidney doctor to monitor potassium levels  4. Current medications reviewed. No changes at this time. Refills given as warranted. 5. No cardiac testing at this time. 6. Repeat cholesterol, CMP and CBC blood work the morning before your stress test   -call pcp to see if they want any other blood work done  7. Call to schedule a Lexiscan Stress Test to look for blockages and the blood flow through the heart  -A small IV will be placed into your arm to administer a radioisotope (myoview) to visualize your heart. .   -You will then have a waiting period to allow the tracer to be absorbed by the heart muscle. After the waiting period, we will take pictures of the blood flow to your heart muscle with the nuclear camera. -Following your pictures, we will perform your stress test. We can do your stress test by having you walk on the treadmill or by giving you a medication ([place]) which makes your body think it is exercising.   -We will monitor you before, during, and after your stress test to make sure you are safe and comfortable. 8. I will personally review your tests and then I will have my staff call you with the results. Follow up with me in 6 months    Your provider has ordered testing for further evaluation. An order/prescription has been included in your paper work.  To schedule outpatient testing, contact Central Scheduling by calling [address] [place], [place] [address]Y ([phone]).

## 2022-02-07 NOTE — PROGRESS NOTES
[facility]   Cardiac Followup    Referring Provider:  Mauro Seymour MD     Chief Complaint   Patient presents with    6 Month Follow-Up    Coronary Artery Disease    Other     leg (both) and finger cramping      Subjective: Mr Dashawn Albert presents today for cardiology follow up of CAD s/p 2V CABG, CMP, HTN, HLD, CHF; c/o SOB today    History of Present Illness:   Mr. Dashawn Albert is a 73 y.o. male here for routine cardiac f/u. Last OV 3/5/21 He has PMH HTN, HLD, s/p back surgery 12/12, PVD s/p right fem-pop bypass surgery 10/10, hx CAD s/p 2V CABG in 2010, mild-mod ischemic CM EF=40% Feb 2015, hx MI, severe COPD, severe CHRISTOFER on Bipap, and CRI   (nephrologist, Dr. Rick Leonardo, in [place]). Note carotid study 10/2012  Bilateral <50%     Admitted 48 days for pneumonia in 10/13 at [facility] for Klebsiella PNA with complications including PEA cardiac arrest and tracheostomy temporarily. Also had 2 DVT in the LLE and placed on coumadin. Most recent LHC/RHC 10/3/14 showed severe native CAD with patent LIMA-LAD and SVG-PDA with no need for PCI. He wears 3L O2 qd and uses Bipap during night along with 5L NC oxygen. He did NOT have chest pain prior to CABG and had SOB. Most recent EKG 3/5/21 NSR 93bpm; septal infarct; low voltage; nonspecific ST change (no change 4/15). Most recent ECHO 3/26/21 showed 35-40% EF at [facility] (EF=40% in 8/17 and 55% in 10/12). Today, he presents in a wheelchair and on his home 3L of oxygen. He reports he is doing OK overall today. He c/o SOB today at rest and with exertion. Reports being mainly sedentary. Patient denies current edema, chest pain,  palpitations, dizziness or syncope. Patient is taking all cardiac medications as prescribed and tolerates them well. Weight today is 334# (down 16# from 3/2021)    Patient is vaccinated against Covid.  Aurther Dyefabi 2/2    Past Medical History:   has a past medical history of CAD (coronary artery disease), CHF (congestive heart failure) (ZYLOPRIM) 300 MG tablet allopurinol 300 mg tablet      calcitRIOL (ROCALTROL) 0.25 MCG capsule Take 0.25 mcg by mouth daily      Cholecalciferol (VITAMIN D3) 2000 units CAPS Take by mouth daily      Docusate Calcium (STOOL SOFTENER PO) Take by mouth daily      metFORMIN (GLUCOPHAGE) 500 MG tablet Take 500 mg by mouth 3 times daily       polyethylene glycol (GLYCOLAX) packet Take 17 g by mouth daily as needed for Constipation      insulin glargine (BASAGLAR KWIKPEN) 100 UNIT/ML injection pen Basaglar KwikPen U-100 Insulin 100 unit/mL (3 mL) subcutaneous      Ascorbic Acid (VITAMIN C) 500 MG tablet Take 1,000 mg by mouth daily      clopidogrel (PLAVIX) 75 MG tablet Take 75 mg by mouth daily      pantoprazole sodium (PROTONIX) 40 MG PACK packet Take 40 mg by mouth 2 times daily (before meals)      ramipril (ALTACE) 1.25 MG capsule Take 1.25 mg by mouth daily       atorvastatin (LIPITOR) 40 MG tablet Take 1 tablet by mouth daily 30 tablet 3    ferrous sulfate 325 (65 FE) MG tablet Take 325 mg by mouth daily (with breakfast)      oxyCODONE HCl ER 10 MG CR tablet Take 1 tablet by mouth every 12 hours. 4 tablet 0    metoprolol (TOPROL-XL) 25 MG XL tablet Take 25 mg by mouth daily.  albuterol (PROVENTIL HFA;VENTOLIN HFA) 108 (90 BASE) MCG/ACT inhaler Inhale 2 puffs into the lungs every 6 hours as needed for Wheezing.  potassium chloride SA (K-DUR;KLOR-CON) 10 MEQ tablet Take 10 mEq by mouth 2 times daily        No facility-administered encounter medications on file as of 2/7/2022. Allergies:  Patient has no known allergies. Review of Systems:   · Constitutional: there has been no unanticipated weight loss. There's been no change in energy level, sleep pattern, or activity level. · Eyes: No visual changes or diplopia. No scleral icterus. · ENT: No Headaches, hearing loss or vertigo. No mouth sores or sore throat.   · Cardiovascular: Reviewed in HPI  · Respiratory: No cough or wheezing, no sputum production. No hematemesis. · Gastrointestinal: No abdominal pain, appetite loss, blood in stools. No change in bowel or bladder habits. · Genitourinary: No dysuria, trouble voiding, or hematuria. · Musculoskeletal:  No gait disturbance, weakness or joint complaints. · Integumentary: No rash or pruritis. · Neurological: No headache, diplopia, change in muscle strength, numbness or tingling. No change in gait, balance, coordination, mood, affect, memory, mentation, behavior. · Psychiatric: No anxiety, no depression. · Endocrine: No malaise, fatigue or temperature intolerance. No excessive thirst, fluid intake, or urination. No tremor. · Hematologic/Lymphatic: No abnormal bruising or bleeding, blood clots or swollen lymph nodes. · Allergic/Immunologic: No nasal congestion or hives. Physical Examination:    Vitals:    02/07/22 1358   BP: 134/68   Pulse: 95   Temp: 98.3 °F (36.8 °C)   SpO2: 95%   Weight: (!) 334 lb 3.2 oz (151.6 kg)   Height: 5' 11\" (1.803 m)     Constitutional and General Appearance: NAD; obese in wheelchair  Respiratory:  · Normal excursion and expansion without use of accessory muscles  Resp Auscultation: soft crackles and wheezing right base  Cardiovascular:  · The apical impulses not displaced  · Heart tones are crisp and normal  · Cervical veins are not engorged  · The carotid upstroke is normal in amplitude and contour without delay or bruit  · soft S1S2, No S3, ? soft LEFTY, RRR  · Peripheral pulses are symmetrical and full  · There is no clubbing, cyanosis of the extremities.   · trace to 1+ firm BLE edema; thick skin   · Femoral Arteries: 2+ and equal  · Pedal Pulses: 2+ and equal   Abdomen:  · No masses or tenderness  · Liver/Spleen: No Abnormalities Noted  Neurological/Psychiatric:  · Alert and oriented in all spheres  · Moves all extremities well  · Exhibits normal gait balance and coordination  · No abnormalities of mood, affect, memory, mentation, or behavior are noted    Lab Results   Component Value Date     03/11/2021    K 4.5 03/11/2021    CL 94 (L) 03/11/2021    CO2 38 (H) 03/11/2021    BUN 35 (H) 03/11/2021    CREATININE 1.6 (H) 03/11/2021    GLUCOSE 146 (H) 03/11/2021    CALCIUM 9.5 03/11/2021    PROT 7.1 03/11/2021    LABALBU 3.9 03/11/2021    BILITOT 0.3 03/11/2021    ALKPHOS 71 03/11/2021    AST 12 (L) 03/11/2021    ALT 13 03/11/2021    LABGLOM 43 (A) 03/11/2021    GFRAA 52 (A) 03/11/2021    AGRATIO 1.2 03/11/2021    GLOB 3.2 03/11/2021       Lab Results   Component Value Date    WBC 10.7 03/11/2021    HGB 9.6 (L) 03/11/2021    HCT 30.4 (L) 03/11/2021    MCV 88.6 03/11/2021     03/11/2021       CHOL 3/11/2021   108  TRIG   3/11/2021   165  Lab Results   Component Value Date    CHOL 103 05/29/2015    CHOL 107 10/03/2014     Lab Results   Component Value Date    TRIG 154 (H) 05/29/2015    TRIG 114 10/03/2014     Lab Results   Component Value Date    HDL 26 (L) 03/11/2021    HDL 29 (L) 11/21/2018    HDL 34 (L) 08/22/2017     Lab Results   Component Value Date    LDLCALC 49 03/11/2021    LDLCALC 44 11/21/2018    LDLCALC 47 08/22/2017     Lab Results   Component Value Date    LABVLDL 33 03/11/2021    LABVLDL 37 11/21/2018    LABVLDL 26 08/22/2017    VLDL 31 05/29/2015     Assessment:     1. CAD (coronary artery disease and mild ischemic cardiomyopathy:  Most recent LHC/RHC 10/3/14 showed severe native CAD with patent grafts and no need for PCI. Most recent ECHO 3/26/21 showed 35-40% (no major change 2017 study). Continue ramipril and toprol XL for LV dysfcn. Continue DAPT given PVD, CAD, and diabetic making higher risk for CV events. Note SOB complaints today which was anginal equivalent prior. No cardiac testing in >7 years and he merits non-invasive cardiac testing to assess myocardial perfusion.      2. Hyperlipidemia: I personally reviewed most recent labs from 3/11/21 showing well controlled lipids LDL 49 except chronically low HDL and will continue current medical regimen. I will check labs and adjust accordingly. 3. HTN (hypertension): Well controlled and will continue current medical regimen. 4. S/P CABG x 2:  See #1 above. 5. LE edema:  Improved. Chronic issue. Will continue demadex 40 BID and metolazone 5mg PRN maximum 3x per week. Note he follows nephrologist, Dr. Heidy Villa, in [place]. Plan:  1. Continue working on getting medications covered so they are more affordable  2. I would like for you to continue taking dual blood thinners   3. Continue to follow with kidney doctor to monitor potassium levels  4. Current medications reviewed. No changes at this time. Refills given as warranted. 5. Repeat cholesterol, CMP and CBC blood work the morning before your stress test   -call pcp to see if they want any other blood work done  6. Call to schedule a Lexiscan Stress Test to look for blockages and the blood flow through the heart  -A small IV will be placed into your arm to administer a radioisotope (myoview) to visualize your heart. .   -You will then have a waiting period to allow the tracer to be absorbed by the heart muscle. After the waiting period, we will take pictures of the blood flow to your heart muscle with the nuclear camera. -Following your pictures, we will perform your stress test. We can do your stress test by having you walk on the treadmill or by giving you a medication ([place]) which makes your body think it is exercising.   -We will monitor you before, during, and after your stress test to make sure you are safe and comfortable. 8. I will personally review your tests and then I will have my staff call you with the results. Continue current med mgt if no concerning findings. Follow up with me in 6 months    This note is scribed in the presence of Dr. James Montesinos by José Miguel Dawson RN.    I, Dr. Corina Camarillo, personally performed the services described in this documentation, as scribed by the above signed scribe in my presence. It is both accurate and complete to my knowledge. I agree with the details independently gathered by the clinical support staff, while the remaining scribed note accurately describes my personal service to the patient. Cost of prescription medications and patient compliance have been reviewed with patient. All questions answered. Thank you for allowing me to participate in the care of this individual.    Debby March M.D., [facility]

## 2022-02-21 ENCOUNTER — HOSPITAL ENCOUNTER (OUTPATIENT)
Dept: NON INVASIVE DIAGNOSTICS | Age: 72
Discharge: HOME | End: 2022-02-21
Payer: MEDICARE

## 2022-02-21 ENCOUNTER — HOSPITAL ENCOUNTER (OUTPATIENT)
Dept: NUCLEAR MEDICINE | Age: 72
Discharge: HOME | End: 2022-02-21
Payer: MEDICARE

## 2022-02-21 ENCOUNTER — HOSPITAL ENCOUNTER (OUTPATIENT)
Age: 72
Discharge: HOME | End: 2022-02-21
Payer: MEDICARE

## 2022-02-21 DIAGNOSIS — R06.02 SOB (SHORTNESS OF BREATH): ICD-10-CM

## 2022-02-21 DIAGNOSIS — Z79.899 MEDICATION MANAGEMENT: ICD-10-CM

## 2022-02-21 LAB
A/G RATIO: 1.1 (ref 1.1–2.2)
ALBUMIN SERPL-MCNC: 3.9 G/DL (ref 3.4–5)
ALP BLD-CCNC: 100 U/L (ref 40–129)
ALT SERPL-CCNC: 12 U/L (ref 10–40)
ANION GAP SERPL CALCULATED.3IONS-SCNC: 15 MMOL/L (ref 3–16)
AST SERPL-CCNC: 13 U/L (ref 15–37)
BASOPHILS ABSOLUTE: 0.1 K/UL (ref 0–0.2)
BASOPHILS RELATIVE PERCENT: 0.8 %
BILIRUB SERPL-MCNC: <0.2 MG/DL (ref 0–1)
BUN BLDV-MCNC: 29 MG/DL (ref 7–20)
CALCIUM SERPL-MCNC: 9.9 MG/DL (ref 8.3–10.6)
CHLORIDE BLD-SCNC: 87 MMOL/L (ref 99–110)
CHOLESTEROL, TOTAL: 101 MG/DL (ref 0–199)
CO2: 37 MMOL/L (ref 21–32)
CREAT SERPL-MCNC: 1.7 MG/DL (ref 0.8–1.3)
EOSINOPHILS ABSOLUTE: 0.3 K/UL (ref 0–0.6)
EOSINOPHILS RELATIVE PERCENT: 2.6 %
GFR AFRICAN AMERICAN: 48
GFR NON-AFRICAN AMERICAN: 40
GLUCOSE BLD-MCNC: 202 MG/DL (ref 70–99)
HCT VFR BLD CALC: 31.3 % (ref 40.5–52.5)
HDLC SERPL-MCNC: 32 MG/DL (ref 40–60)
HEMOGLOBIN: 10 G/DL (ref 13.5–17.5)
LDL CHOLESTEROL CALCULATED: 31 MG/DL
LV EF: 45 %
LVEF MODALITY: NORMAL
LYMPHOCYTES ABSOLUTE: 1.1 K/UL (ref 1–5.1)
LYMPHOCYTES RELATIVE PERCENT: 10.5 %
MCH RBC QN AUTO: 27.4 PG (ref 26–34)
MCHC RBC AUTO-ENTMCNC: 31.8 G/DL (ref 31–36)
MCV RBC AUTO: 86.1 FL (ref 80–100)
MONOCYTES ABSOLUTE: 0.9 K/UL (ref 0–1.3)
MONOCYTES RELATIVE PERCENT: 8.6 %
NEUTROPHILS ABSOLUTE: 7.9 K/UL (ref 1.7–7.7)
NEUTROPHILS RELATIVE PERCENT: 77.5 %
PDW BLD-RTO: 19.6 % (ref 12.4–15.4)
PLATELET # BLD: 180 K/UL (ref 135–450)
PMV BLD AUTO: 8 FL (ref 5–10.5)
POTASSIUM SERPL-SCNC: 3.8 MMOL/L (ref 3.5–5.1)
RBC # BLD: 3.64 M/UL (ref 4.2–5.9)
SODIUM BLD-SCNC: 139 MMOL/L (ref 136–145)
TOTAL PROTEIN: 7.5 G/DL (ref 6.4–8.2)
TRIGL SERPL-MCNC: 192 MG/DL (ref 0–150)
VLDLC SERPL CALC-MCNC: 38 MG/DL
WBC # BLD: 10.2 K/UL (ref 4–11)

## 2022-02-21 PROCEDURE — A9502 TC99M TETROFOSMIN: HCPCS | Performed by: INTERNAL MEDICINE

## 2022-02-21 PROCEDURE — 80061 LIPID PANEL: CPT

## 2022-02-21 PROCEDURE — 78452 HT MUSCLE IMAGE SPECT MULT: CPT

## 2022-02-21 PROCEDURE — 6360000002 HC RX W HCPCS: Performed by: INTERNAL MEDICINE

## 2022-02-21 PROCEDURE — 80053 COMPREHEN METABOLIC PANEL: CPT

## 2022-02-21 PROCEDURE — 36415 COLL VENOUS BLD VENIPUNCTURE: CPT

## 2022-02-21 PROCEDURE — 93017 CV STRESS TEST TRACING ONLY: CPT

## 2022-02-21 PROCEDURE — 3430000000 HC RX DIAGNOSTIC RADIOPHARMACEUTICAL: Performed by: INTERNAL MEDICINE

## 2022-02-21 PROCEDURE — 85025 COMPLETE CBC W/AUTO DIFF WBC: CPT

## 2022-02-21 RX ADMIN — TETROFOSMIN 32.9 MILLICURIE: 1.38 INJECTION, POWDER, LYOPHILIZED, FOR SOLUTION INTRAVENOUS at 11:23

## 2022-02-21 RX ADMIN — REGADENOSON 0.4 MG: 0.08 INJECTION, SOLUTION INTRAVENOUS at 11:23

## 2022-02-22 ENCOUNTER — HOSPITAL ENCOUNTER (OUTPATIENT)
Dept: NUCLEAR MEDICINE | Age: 72
Discharge: HOME | End: 2022-02-22

## 2022-02-22 PROCEDURE — 3430000000 HC RX DIAGNOSTIC RADIOPHARMACEUTICAL: Performed by: INTERNAL MEDICINE

## 2022-02-22 PROCEDURE — A9502 TC99M TETROFOSMIN: HCPCS | Performed by: INTERNAL MEDICINE

## 2022-02-22 RX ADMIN — TETROFOSMIN 34 MILLICURIE: 1.38 INJECTION, POWDER, LYOPHILIZED, FOR SOLUTION INTRAVENOUS at 10:43

## 2022-02-23 ENCOUNTER — TELEPHONE (OUTPATIENT)
Dept: CARDIOLOGY CLINIC | Age: 72
End: 2022-02-23

## 2022-02-23 DIAGNOSIS — R94.39 ABNORMAL STRESS TEST: ICD-10-CM

## 2022-02-23 DIAGNOSIS — I25.10 CORONARY ARTERY DISEASE INVOLVING NATIVE CORONARY ARTERY OF NATIVE HEART WITHOUT ANGINA PECTORIS: ICD-10-CM

## 2022-02-23 DIAGNOSIS — R06.02 SOB (SHORTNESS OF BREATH): Primary | ICD-10-CM

## 2022-02-23 RX ORDER — METOPROLOL SUCCINATE 25 MG/1
25 TABLET, EXTENDED RELEASE ORAL 2 TIMES DAILY
Qty: 180 TABLET | Refills: 3 | Status: SHIPPED | OUTPATIENT
Start: 2022-02-23

## 2022-02-23 NOTE — TELEPHONE ENCOUNTER
Spoke with patient's wife. Patient is scheduled with Dr. Crystal Musa for Right and Left Heart Cath on 3/3/22 at [address], arrival time of 6:30am to the Cath Lab. Please have patient arrive to the main entrance of [facility] and check in with the registration desk. Please call patient regarding medication instructions. Remind patient to be NPO after midnight (8 hours prior). Do not apply lotions/creams on skin the day of procedure.     COVID testing - RAPID the day of or  Pre-Procedure Process   We are asking the physicians' offices to encourage all patients to obtain a COVID test prior to their procedure (within 6 days of their scheduled date)   The PAT RN will encourage the patient to obtain a COVID test prior to their procedure (within 6 days of their scheduled date)   This can be done at their Primary Care Doctor, [address], Urgent Care, [place], [facility], etc.   A PCR or Rapid test result will be accepted        [address] [place] [address]/Walk in ([address] [facility] main entrance of hospital)  Monday - Friday 8am-5pm

## 2022-02-23 NOTE — TELEPHONE ENCOUNTER
----- Message from Paco Vázquez MD sent at 2/22/2022  5:59 PM EST -----  I called and went over abnormal nuc imaging results. I want him to increase Toprol XL to 25mg po BID. Please send in script to [facility] in [place] for increased amount of medication. I will discuss case with interventional partner regarding whether cath appropriate or not at this time and let you know. Thanks.

## 2022-02-23 NOTE — TELEPHONE ENCOUNTER
----- Message from Orestes Miranda MD sent at 2/23/2022  7:41 AM EST -----  I discussed case with Dr. Myles Quiros and he wants to cath patient. Please call him and arrange LHC and RHC for indication: CAD s/p CABG, abnormal nuc imaging study, increased SOB. Please arrange and Mr. Sergio Carvajal will be awaiting your call. Thanks.

## 2022-02-24 NOTE — TELEPHONE ENCOUNTER
LM for return call regarding time change to 9:30am, 8am arrival to the Cath Lab. Medications still need reviewed.

## 2022-02-25 NOTE — TELEPHONE ENCOUNTER
Take only 1/2 of normal dose of insulin night prior. HOLD potassium, torsemide, vit D, insulin, vit C, iron morning of. Informed patient.

## 2022-02-28 NOTE — TELEPHONE ENCOUNTER
Per pt's daughter he is sched to receive Aranesp injection 100 mcg.(per daughter it is for his blood.) On 3.1.22 (tomorrow) he is sched for cath on 3.2.22. Wanting to know if OK to get injection before procedure?

## 2022-03-03 ENCOUNTER — HOSPITAL ENCOUNTER (OUTPATIENT)
Dept: CARDIAC CATH/INVASIVE PROCEDURES | Age: 72
Discharge: HOME | End: 2022-03-03
Attending: INTERNAL MEDICINE | Admitting: INTERNAL MEDICINE
Payer: MEDICARE

## 2022-03-03 ENCOUNTER — HOSPITAL ENCOUNTER (OUTPATIENT)
Age: 72
Discharge: HOME | End: 2022-03-03
Payer: MEDICARE

## 2022-03-03 VITALS — HEIGHT: 71 IN | WEIGHT: 315 LBS | BODY MASS INDEX: 44.1 KG/M2

## 2022-03-03 DIAGNOSIS — I25.5 ISCHEMIC CARDIOMYOPATHY: Primary | ICD-10-CM

## 2022-03-03 DIAGNOSIS — I50.9 CONGESTIVE HEART FAILURE, NYHA CLASS 4, UNSPECIFIED CONGESTIVE HEART FAILURE TYPE (HCC): ICD-10-CM

## 2022-03-03 LAB
A/G RATIO: 1.3 (ref 1.1–2.2)
ALBUMIN SERPL-MCNC: 4 G/DL (ref 3.4–5)
ALP BLD-CCNC: 88 U/L (ref 40–129)
ALT SERPL-CCNC: 12 U/L (ref 10–40)
ANION GAP SERPL CALCULATED.3IONS-SCNC: 12 MMOL/L (ref 3–16)
AST SERPL-CCNC: 13 U/L (ref 15–37)
BILIRUB SERPL-MCNC: 0.4 MG/DL (ref 0–1)
BUN BLDV-MCNC: 49 MG/DL (ref 7–20)
CALCIUM SERPL-MCNC: 9.3 MG/DL (ref 8.3–10.6)
CHLORIDE BLD-SCNC: 84 MMOL/L (ref 99–110)
CHOLESTEROL, TOTAL: 100 MG/DL (ref 0–199)
CO2: 38 MMOL/L (ref 21–32)
CREAT SERPL-MCNC: 1.7 MG/DL (ref 0.8–1.3)
EKG ATRIAL RATE: 85 BPM
EKG DIAGNOSIS: NORMAL
EKG Q-T INTERVAL: 340 MS
EKG QRS DURATION: 90 MS
EKG QTC CALCULATION (BAZETT): 404 MS
EKG R AXIS: 20 DEGREES
EKG T AXIS: 89 DEGREES
EKG VENTRICULAR RATE: 85 BPM
GFR AFRICAN AMERICAN: 48
GFR NON-AFRICAN AMERICAN: 40
GLUCOSE BLD-MCNC: 173 MG/DL (ref 70–99)
HCT VFR BLD CALC: 30.3 % (ref 40.5–52.5)
HDLC SERPL-MCNC: 30 MG/DL (ref 40–60)
HEMOGLOBIN: 9.7 G/DL (ref 13.5–17.5)
LDL CHOLESTEROL CALCULATED: 40 MG/DL
MCH RBC QN AUTO: 27.1 PG (ref 26–34)
MCHC RBC AUTO-ENTMCNC: 32 G/DL (ref 31–36)
MCV RBC AUTO: 84.7 FL (ref 80–100)
PDW BLD-RTO: 19.3 % (ref 12.4–15.4)
PLATELET # BLD: 222 K/UL (ref 135–450)
PMV BLD AUTO: 7.1 FL (ref 5–10.5)
POTASSIUM SERPL-SCNC: 3.4 MMOL/L (ref 3.5–5.1)
RBC # BLD: 3.58 M/UL (ref 4.2–5.9)
SARS-COV-2, NAAT: NOT DETECTED
SODIUM BLD-SCNC: 134 MMOL/L (ref 136–145)
TOTAL PROTEIN: 7.1 G/DL (ref 6.4–8.2)
TRIGL SERPL-MCNC: 151 MG/DL (ref 0–150)
VLDLC SERPL CALC-MCNC: 30 MG/DL
WBC # BLD: 9.9 K/UL (ref 4–11)

## 2022-03-03 PROCEDURE — 85027 COMPLETE CBC AUTOMATED: CPT

## 2022-03-03 PROCEDURE — C1887 CATHETER, GUIDING: HCPCS

## 2022-03-03 PROCEDURE — 2500000003 HC RX 250 WO HCPCS

## 2022-03-03 PROCEDURE — 80061 LIPID PANEL: CPT

## 2022-03-03 PROCEDURE — 93461 R&L HRT ART/VENTRICLE ANGIO: CPT | Performed by: INTERNAL MEDICINE

## 2022-03-03 PROCEDURE — 80053 COMPREHEN METABOLIC PANEL: CPT

## 2022-03-03 PROCEDURE — C1894 INTRO/SHEATH, NON-LASER: HCPCS

## 2022-03-03 PROCEDURE — C1769 GUIDE WIRE: HCPCS

## 2022-03-03 PROCEDURE — 2709999900 HC NON-CHARGEABLE SUPPLY

## 2022-03-03 PROCEDURE — 6360000002 HC RX W HCPCS

## 2022-03-03 PROCEDURE — 93005 ELECTROCARDIOGRAM TRACING: CPT | Performed by: INTERNAL MEDICINE

## 2022-03-03 PROCEDURE — 6370000000 HC RX 637 (ALT 250 FOR IP)

## 2022-03-03 PROCEDURE — 93461 R&L HRT ART/VENTRICLE ANGIO: CPT

## 2022-03-03 PROCEDURE — 93010 ELECTROCARDIOGRAM REPORT: CPT | Performed by: INTERNAL MEDICINE

## 2022-03-03 PROCEDURE — 87635 SARS-COV-2 COVID-19 AMP PRB: CPT

## 2022-03-03 RX ORDER — FENTANYL CITRATE 50 UG/ML
INJECTION, SOLUTION INTRAMUSCULAR; INTRAVENOUS
Status: COMPLETED | OUTPATIENT
Start: 2022-03-03 | End: 2022-03-03

## 2022-03-03 RX ORDER — MIDAZOLAM HYDROCHLORIDE 5 MG/ML
INJECTION INTRAMUSCULAR; INTRAVENOUS
Status: COMPLETED | OUTPATIENT
Start: 2022-03-03 | End: 2022-03-03

## 2022-03-03 RX ORDER — ASPIRIN 81 MG/1
81 TABLET, CHEWABLE ORAL DAILY
Status: DISCONTINUED | OUTPATIENT
Start: 2022-03-03 | End: 2022-03-03 | Stop reason: HOSPADM

## 2022-03-03 RX ORDER — OXYCODONE HYDROCHLORIDE 5 MG/1
10 TABLET ORAL ONCE
Status: COMPLETED | OUTPATIENT
Start: 2022-03-03 | End: 2022-03-03

## 2022-03-03 RX ORDER — SODIUM CHLORIDE 0.9 % (FLUSH) 0.9 %
5-40 SYRINGE (ML) INJECTION PRN
Status: DISCONTINUED | OUTPATIENT
Start: 2022-03-03 | End: 2022-03-03 | Stop reason: HOSPADM

## 2022-03-03 RX ORDER — SODIUM CHLORIDE 9 MG/ML
1000 INJECTION, SOLUTION INTRAVENOUS CONTINUOUS
Status: DISCONTINUED | OUTPATIENT
Start: 2022-03-03 | End: 2022-03-03

## 2022-03-03 RX ORDER — SODIUM CHLORIDE 0.9 % (FLUSH) 0.9 %
5-40 SYRINGE (ML) INJECTION EVERY 12 HOURS SCHEDULED
Status: DISCONTINUED | OUTPATIENT
Start: 2022-03-03 | End: 2022-03-03 | Stop reason: HOSPADM

## 2022-03-03 RX ORDER — ACETAMINOPHEN 325 MG/1
650 TABLET ORAL EVERY 4 HOURS PRN
Status: DISCONTINUED | OUTPATIENT
Start: 2022-03-03 | End: 2022-03-03 | Stop reason: HOSPADM

## 2022-03-03 RX ORDER — SODIUM CHLORIDE 9 MG/ML
25 INJECTION, SOLUTION INTRAVENOUS PRN
Status: DISCONTINUED | OUTPATIENT
Start: 2022-03-03 | End: 2022-03-03 | Stop reason: HOSPADM

## 2022-03-03 RX ORDER — CLOPIDOGREL BISULFATE 75 MG/1
75 TABLET ORAL DAILY
Status: DISCONTINUED | OUTPATIENT
Start: 2022-03-03 | End: 2022-03-03 | Stop reason: HOSPADM

## 2022-03-03 RX ORDER — SODIUM CHLORIDE 9 MG/ML
1000 INJECTION, SOLUTION INTRAVENOUS CONTINUOUS
Status: DISCONTINUED | OUTPATIENT
Start: 2022-03-03 | End: 2022-03-03 | Stop reason: HOSPADM

## 2022-03-03 RX ADMIN — MIDAZOLAM HYDROCHLORIDE 1 MG: 5 INJECTION INTRAMUSCULAR; INTRAVENOUS at 11:54

## 2022-03-03 RX ADMIN — OXYCODONE HYDROCHLORIDE 10 MG: 5 TABLET ORAL at 14:26

## 2022-03-03 RX ADMIN — MIDAZOLAM HYDROCHLORIDE 1 MG: 5 INJECTION INTRAMUSCULAR; INTRAVENOUS at 11:31

## 2022-03-03 RX ADMIN — FENTANYL CITRATE 25 MCG: 50 INJECTION, SOLUTION INTRAMUSCULAR; INTRAVENOUS at 11:23

## 2022-03-03 RX ADMIN — MIDAZOLAM HYDROCHLORIDE 1 MG: 5 INJECTION INTRAMUSCULAR; INTRAVENOUS at 11:44

## 2022-03-03 RX ADMIN — MIDAZOLAM HYDROCHLORIDE 1 MG: 5 INJECTION INTRAMUSCULAR; INTRAVENOUS at 11:23

## 2022-03-03 RX ADMIN — FENTANYL CITRATE 25 MCG: 50 INJECTION, SOLUTION INTRAMUSCULAR; INTRAVENOUS at 11:44

## 2022-03-03 RX ADMIN — FENTANYL CITRATE 25 MCG: 50 INJECTION, SOLUTION INTRAMUSCULAR; INTRAVENOUS at 12:09

## 2022-03-03 RX ADMIN — MIDAZOLAM HYDROCHLORIDE 1 MG: 5 INJECTION INTRAMUSCULAR; INTRAVENOUS at 12:09

## 2022-03-03 RX ADMIN — CLOPIDOGREL BISULFATE 75 MG: 75 TABLET ORAL at 08:36

## 2022-03-03 RX ADMIN — FENTANYL CITRATE 25 MCG: 50 INJECTION, SOLUTION INTRAMUSCULAR; INTRAVENOUS at 11:55

## 2022-03-03 RX ADMIN — MIDAZOLAM HYDROCHLORIDE 1 MG: 5 INJECTION INTRAMUSCULAR; INTRAVENOUS at 12:29

## 2022-03-03 RX ADMIN — ASPIRIN 81 MG: 81 TABLET, CHEWABLE ORAL at 08:36

## 2022-03-03 ASSESSMENT — PAIN SCALES - GENERAL: PAINLEVEL_OUTOF10: 8

## 2022-03-03 NOTE — PROCEDURES
CARDIAC CATHETERIZATION REPORT    Date of Procedure: 3/3/2022  : Luis Alberto Zavala DO  Primary Indication: Dyspnea, biventricular systolic heart failure, ischemic cardiomyopathy, CAD with remote 2-vessel CABG (LIMA to LAD, SVG to RPDA), abnormal nuclear SPECT stress test    Procedures Performed:  1. Coronary angiography  2. Left heart catheterization  3. Left subclavian angiography  4. LIMA angiography  5. Saphenous vein graft angiography  6. Right heart catheterization  7. Ultrasound-guided right femoral artery access  8. Ultrasound-guided right femoral vein access  9. Right femoral angiography  10. Moderate conscious sedation     Procedural Details:  1. Access: Local anesthetic was given and access was obtained in the right femoral artery using a micropuncture technique and ultrasound guidance and a 6F sheath was placed over an 0.035 Amplatz wire. Access was also obtained in the right femoral vein using a micropuncture technique and ultrasound guidance and a 5F sheath was placed over 0.025 Amplatz wire. 2. Diagnostic: A 5F Merlene Prows catheter was used to perform the right heart catheterization. 5F JR4 and 5F JL4 catheters were used to perform selective right and left coronary angiography, respectively. The 5F JR4 catheter was used to perform left subclavian angiography. A 5F RENETTA catheter was used to perform LIMA angiography. A 5F AR1 catheter was used to perform saphenous vein graft angiography. The 5F JR4 catheter was used to perform the left heart catheterization. No significant gradient was observed on pull-back of the catheter across the aortic valve. 3. Hemostasis: At the end of the procedure, the right femoral artery sheath was removed and manual pressure applied to maintain hemostasis. The right femoral venous sheath was removed and manual pressure applied to maintain hemostasis. Findings:  1. Hemodynamics:  A.  Right heart catheterization                   1. RA: 15 mmHg 2. RV: 43/17 mmHg                   3. PA: 43/30 (34) mmHg                   4. PCWP: 20 mmHg                   5. Saturations: AO 91%, RA 61%, PA 62%                   6. Kimmy CO: 8.86 L/min                   7. Kimmy CI: 3.36 L/min*m2                   8. Kimmy SVR: 551 dyne*sec                   9. Kimmy PVR: 126 dyne*sec/cm5     B. Opening arterial pressure: 105/54 (76) mmHg      C. LVEDP: 20 mmHg    2. Coronary anatomy:  A. Left main artery: The left main artery bifurcates into the left anterior descending artery and left circumflex artery. The left main artery is short and has mild disease. B. Left anterior descending artery: The LAD is calcified and has a 70-80% ostial stenosis, 70% proximal stenosis, and 100% mid-vessel stenosis. C. Left circumflex artery: Non-dominant vessel that gives rise to 3 obtuse marginal arteries. The LCx has a 20% ostial stenosis and 20-30% proximal stenosis. The OM1 and OM2 are very small and have mild disease. The OM3 is a large vessel with minor luminal irregularities. D. Right coronary artery: Dominant vessel. The RCA has an 80% proximal stenosis and 100% mid-vessel stenosis. 3. Bypass graft anatomy:  A. Left subclavian artery: Patent with mild disease. B. LIMA to LAD: Widely patent. The distal LAD beyond the anastomosis has mild disease. There is retrograde filling to the mid-vessel and of a medium caliber diagonal branch. C. SVG to RPDA: The RPDA beyond the anastomosis has mild disease. There is retrograde filling of the RPLB. 4. Right femoral angiography:  A. The right common femoral artery is calcified with moderate diffuse disease and bifurcates into the right superficial femoral artery and right profunda femoris artery below the level of the inferior margin of the femoral head. The sheath enters the right common femoral artery above the bifurcation over the inferior third of the femoral head. Technical Factors:  Complications: None.   Estimated blood loss: Minimal.  Radiation:  Air kerma 1,490 mGy and 19.3 minutes of fluoroscopy  Sedation: Moderate conscious sedation was administered by qualified nursing personnel under continuous hemodynamic monitoring, starting at 11:31 AM and ending at 12:26 PM.  Medications: 5 mg IV Versed, 125 mcg IV Fentanyl  Contrast: 100 cc of Isovue     Impression:  1. Severe native 2-vessel coronary artery disease. 2. Patent LIMA to LAD. 3. Patent SVG to RCA. 4. Acute on chronic biventricular systolic heart failure/ischemic cardiomyopathy with moderately elevated left and right-sided cardiac filling pressures. 5. Mild pulmonary hypertension. 6. Preserved Kimmy cardiac output and index. Plan:  1. Recommend management with guideline-directed medical therapy. 2. Continue aspirin 81 mg daily, Plavix 75 mg daily, atorvastatin 40 mg daily, metoprolol succinate 25 mg daily, and ramipril 1.25 mg daily. 3. Can take 5 mg PO metolazone tomorrow and resume torsemide 40 mg BID. 4. Follow-up with [facility] in 2 weeks.       Osman Conrad, [address]

## 2022-03-16 NOTE — H&P
Brief Pre-Op Note/Sedation Assessment      Torres Talavera  1950  [id number]  9:43 AM    Planned Procedure: Cardiac Catheterization Procedure  Post Procedure Plan: Return to same level of care  Consent: I have discussed with the patient and/or the patient representative the indication, alternatives, and the possible risks and/or complications of the planned procedure and the anesthesia methods. The patient and/or patient representative appear to understand and agree to proceed. Chief Complaint:   Dyspnea, biventricular systolic heart failure, ischemic cardiomyopathy, CAD with remote 2-vessel CABG (LIMA to LAD, SVG to RPDA), abnormal nuclear SPECT stress test    Indications for Cath Procedure:  1. Presentation:  Worsening Angina, Cardiomyopathy and LV Dysfunction  2. Anginal Classification within 2 weeks:  CCS III - Symptoms with everyday living activities, i.e., moderate limitation  3. Angina Symptoms Assessment:  Atypical Chest Pain  4. Heart Failure Class within last 2 weeks:  Yes:  Heart Failure Type: Systolic Severity:  Class III - Symptoms of HF on less-than-ordinary exertion  5. Cardiovascular Instability:  No    Prior Ischemic Workup/Eval:  1. Pre-Procedural Medications: Yes: ACE/ARB/ARNI, Aspirin, Beta Blockers and STATIN  2. Stress Test Completed? Yes:  Stress or Imaging Studies Performed (within ANY time period):   Type:  Stress Nuclear  Results:  Positive:  Myocardial Perfusion Defects (Nuclear) Extent of Ischemia:  Intermediate    Does Patient need surgery? Cath Valve Surgery:  No    Pre-Procedure Medical History:  Vital Signs:  Ht 5' 11\" (1.803 m)   Wt (!) 338 lb (153.3 kg)   BMI 47.14 kg/m²     Allergies:  No Known Allergies  Medications:    No current facility-administered medications for this encounter.      Current Outpatient Medications   Medication Sig Dispense Refill    metoprolol succinate (TOPROL XL) 25 MG extended release tablet Take 1 tablet by mouth in the morning and at bedtime 180 tablet 3    potassium chloride (KLOR-CON M) 20 MEQ extended release tablet Take 1 tablet by mouth 3 times daily      aspirin EC 81 MG EC tablet Take 1 tablet by mouth daily 90 tablet 1    budesonide-formoterol (SYMBICORT) 160-4.5 MCG/ACT AERO Inhale 2 puffs into the lungs 2 times daily 10.2 g 5    ipratropium-albuterol (DUONEB) 0.5-2.5 (3) MG/3ML SOLN nebulizer solution Inhale 3 mLs into the lungs every 6 hours as needed for Shortness of Breath DX COPD J44.9 120 mL 5    Dulaglutide (TRULICITY) 1.5 UB/1.7NF SOPN Inject 1.5 mg into the skin once a week       torsemide (DEMADEX) 20 MG tablet Take 2 tablets by mouth 2 times daily Take 2 tablets twice daily 90 tablet 3    metOLazone (ZAROXOLYN) 5 MG tablet Take 1 tablet by mouth as needed (swelling) 30 tablet 3    amitriptyline (ELAVIL) 25 MG tablet Take 25 mg by mouth nightly      allopurinol (ZYLOPRIM) 300 MG tablet allopurinol 300 mg tablet      calcitRIOL (ROCALTROL) 0.25 MCG capsule Take 0.25 mcg by mouth daily      Cholecalciferol (VITAMIN D3) 2000 units CAPS Take by mouth daily      Docusate Calcium (STOOL SOFTENER PO) Take by mouth daily      polyethylene glycol (GLYCOLAX) packet Take 17 g by mouth daily as needed for Constipation      insulin glargine (BASAGLAR KWIKPEN) 100 UNIT/ML injection pen Basaglar KwikPen U-100 Insulin 100 unit/mL (3 mL) subcutaneous      Ascorbic Acid (VITAMIN C) 500 MG tablet Take 1,000 mg by mouth daily      clopidogrel (PLAVIX) 75 MG tablet Take 75 mg by mouth daily      pantoprazole sodium (PROTONIX) 40 MG PACK packet Take 40 mg by mouth 2 times daily (before meals)      ramipril (ALTACE) 1.25 MG capsule Take 1.25 mg by mouth daily       atorvastatin (LIPITOR) 40 MG tablet Take 1 tablet by mouth daily 30 tablet 3    ferrous sulfate 325 (65 FE) MG tablet Take 325 mg by mouth daily (with breakfast)      oxyCODONE HCl ER 10 MG CR tablet Take 1 tablet by mouth every 12 hours.  4 tablet 0    albuterol (PROVENTIL HFA;VENTOLIN HFA) 108 (90 BASE) MCG/ACT inhaler Inhale 2 puffs into the lungs every 6 hours as needed for Wheezing.  predniSONE (DELTASONE) 10 MG tablet 3 tabs daily for 10 days 30 tablet 0       Past Medical History:    Past Medical History:   Diagnosis Date    CAD (coronary artery disease)     MI    CHF (congestive heart failure) (HCC)     Chronic kidney disease     [facility]ch[facility] [address] [facility]     Diabetes mellitus [facility] [address] [facility]     DVT of leg (deep venous thrombosis) [facility] [address] [facility]     Hyperlipidemia     Hypertension     MRSA infection within last 3 months     Pneumonia     Unspecified diseases of blood and blood-forming organs        Surgical History:    Past Surgical History:   Procedure Laterality Date    ANKLE FRACTURE SURGERY Left 2/22/15    ORIF; LEFT ANKLE OPEN REDUCTION INTERNAL FIXATION    BACK SURGERY      CARDIAC SURGERY      2 VESSEL CABG    CORONARY ARTERY BYPASS GRAFT      DIAGNOSTIC CARDIAC CATH LAB PROCEDURE      FRACTURE SURGERY      LEG SURGERY      VEIN REMOVED FROM L  LEG  PLACED IN RT LEG    LEG SURGERY  2016    [facility]             Pre-Sedation:  Pre-Sedation Documentation and Exam:  I have assessed the patient and reviewed the H&P on the chart. Prior History of Anesthesia Complications:   none    Modified Mallampati:  III (soft palate, base of uvula visible)    ASA Classification:  Class 3 - A patient with severe systemic disease that limits activity but is not incapacitating    Yue Scale: Activity:  2 - Able to move 4 extremities voluntarily on command  Respiration:  2 - Able to breathe deeply and cough freely  Circulation:  2 - BP+/- 20mmHg of normal  Consciousness:  2 - Fully awake  Oxygen Saturation (color):  2 - Able to maintain oxygen saturation >92% on room air    Sedation/Anesthesia Plan:  Guard the patient's safety and welfare. Minimize physical discomfort and pain.   Minimize negative psychological responses to treatment by providing sedation and analgesia and maximize the potential amnesia. Patient to meet pre-procedure discharge plan.     Medication Planned:  midazolam intravenously and fentanyl intravenously    Patient is an appropriate candidate for plan of sedation:   yes      Electronically signed by Violeta Zavala DO on 3/16/2022 at 9:43 AM

## 2022-03-17 ENCOUNTER — OFFICE VISIT (OUTPATIENT)
Dept: CARDIOLOGY CLINIC | Age: 72
End: 2022-03-17
Payer: MEDICARE

## 2022-03-17 VITALS
HEIGHT: 71 IN | DIASTOLIC BLOOD PRESSURE: 60 MMHG | BODY MASS INDEX: 44.1 KG/M2 | HEART RATE: 93 BPM | OXYGEN SATURATION: 96 % | WEIGHT: 315 LBS | SYSTOLIC BLOOD PRESSURE: 118 MMHG

## 2022-03-17 DIAGNOSIS — I25.10 CORONARY ARTERY DISEASE INVOLVING NATIVE CORONARY ARTERY OF NATIVE HEART WITHOUT ANGINA PECTORIS: ICD-10-CM

## 2022-03-17 DIAGNOSIS — I25.5 ISCHEMIC CARDIOMYOPATHY: ICD-10-CM

## 2022-03-17 DIAGNOSIS — I50.22 CHRONIC SYSTOLIC CHF (CONGESTIVE HEART FAILURE) (HCC): Primary | ICD-10-CM

## 2022-03-17 PROCEDURE — 1123F ACP DISCUSS/DSCN MKR DOCD: CPT | Performed by: NURSE PRACTITIONER

## 2022-03-17 PROCEDURE — 1036F TOBACCO NON-USER: CPT | Performed by: NURSE PRACTITIONER

## 2022-03-17 PROCEDURE — G8417 CALC BMI ABV UP PARAM F/U: HCPCS | Performed by: NURSE PRACTITIONER

## 2022-03-17 PROCEDURE — G8484 FLU IMMUNIZE NO ADMIN: HCPCS | Performed by: NURSE PRACTITIONER

## 2022-03-17 PROCEDURE — G8427 DOCREV CUR MEDS BY ELIG CLIN: HCPCS | Performed by: NURSE PRACTITIONER

## 2022-03-17 PROCEDURE — 3017F COLORECTAL CA SCREEN DOC REV: CPT | Performed by: NURSE PRACTITIONER

## 2022-03-17 PROCEDURE — 99214 OFFICE O/P EST MOD 30 MIN: CPT | Performed by: NURSE PRACTITIONER

## 2022-03-17 PROCEDURE — 4040F PNEUMOC VAC/ADMIN/RCVD: CPT | Performed by: NURSE PRACTITIONER

## 2022-03-17 ASSESSMENT — ENCOUNTER SYMPTOMS: SHORTNESS OF BREATH: 1

## 2022-03-17 NOTE — PATIENT INSTRUCTIONS
Take an extra torsemide until weight decreasing and edema improved  Take metolazone today and tomorrow  Check blood work  Echocardiogram, call [phone]  Follow up with Dr. Alex Heading

## 2022-03-17 NOTE — PROGRESS NOTES
[address]   Cardiology Note              Date:  March 16, 2022  Patientname: Hewitt Skiff  YOB: 1950    Primary Care physician: Emelia Jones    HISTORY OF PRESENT ILLNESS: Hewitt Skiff is a 73 y.o. male with a history of CAD, CHF, PVD, HTN, HLD, CHRISTOFER, COPD, CKD, DVT. He had R fem-pop bypass in 2010. He had CABG x2 in 2010. Echo 3/2021 showed EF 35-40%. Stress test 2/2022 abnormal. LHC 3/3/2022 showed patent grafts, severe native CAD, elevated filling pressures. Today he presents for hospital follow up for CAD s/p LHC. He has shortness of breath at baseline that is somehwat worse. He has increased edema. He has no chest pain or dizziness. He has gained weight. He is on 3L NC. He is mostly sedentary, can walk short distances. Does not follow fluid/salt restriction, drinks a lot of fluids and frequently eats fast food. Office weight today 3/17/2022: 347 lbs  2/7/2022: 334 lbs    Past Medical History:   has a past medical history of CAD (coronary artery disease), CHF (congestive heart failure) ([address] Chronic kidney disease, COPD (chronic obstructive pulmonary disease) ([address] Diabetes mellitus ([address] DVT of leg (deep venous thrombosis) ([address] Hyperlipidemia, Hypertension, MRSA infection within last 3 months, Pneumonia, and Unspecified diseases of blood and blood-forming organs. Past Surgical History:   has a past surgical history that includes Cardiac surgery; Leg Surgery; Coronary artery bypass graft; Diagnostic Cardiac Cath Lab Procedure; Ankle fracture surgery (Left, 2/22/15); fracture surgery; back surgery; and Leg Surgery (2016). Home Medications:    Prior to Admission medications    Medication Sig Start Date End Date Taking?  Authorizing Provider   metoprolol succinate (TOPROL XL) 25 MG extended release tablet Take 1 tablet by mouth in the morning and at bedtime 2/23/22   Terry Miller MD   potassium chloride (KLOR-CON M) 20 MEQ extended release tablet Take 1 tablet by mouth 3 times daily 2/7/22   Alea Baires MD   aspirin EC 81 MG EC tablet Take 1 tablet by mouth daily 2/7/22   Alea Baires MD   predniSONE (DELTASONE) 10 MG tablet 3 tabs daily for 10 days 1/7/22   Bradford Lopez PA-C   budesonide-formoterol [facility]) 160-4.5 MCG/ACT AERO Inhale 2 puffs into the lungs 2 times daily 12/16/21   Harvey Russo MD   ipratropium-albuterol (DUONEB) 0.5-2.5 (3) MG/3ML SOLN nebulizer solution Inhale 3 mLs into the lungs every 6 hours as needed for Shortness of Breath DX COPD J44.9 12/16/21   Harvey Russo MD   Dulaglutide (TRULICITY) 1.5 VB/2.9UB SOPN Inject 1.5 mg into the skin once a week     Historical Provider, MD   torsemide (DEMADEX) 20 MG tablet Take 2 tablets by mouth 2 times daily Take 2 tablets twice daily 3/5/21   Alea Baires MD   metOLazone (ZAROXOLYN) 5 MG tablet Take 1 tablet by mouth as needed (swelling) 3/5/21   Alea Baires MD   amitriptyline (ELAVIL) 25 MG tablet Take 25 mg by mouth nightly    Historical Provider, MD   allopurinol (ZYLOPRIM) 300 MG tablet allopurinol 300 mg tablet    Historical Provider, MD   calcitRIOL (ROCALTROL) 0.25 MCG capsule Take 0.25 mcg by mouth daily    Historical Provider, MD   Cholecalciferol (VITAMIN D3) 2000 units CAPS Take by mouth daily    Historical Provider, MD   Docusate Calcium (STOOL SOFTENER PO) Take by mouth daily    Historical Provider, MD   polyethylene glycol (GLYCOLAX) packet Take 17 g by mouth daily as needed for Constipation    Historical Provider, MD   insulin glargine (BASAGLAR KWIKPEN) 100 UNIT/ML injection pen Basaglar KwikPen U-100 Insulin 100 unit/mL (3 mL) subcutaneous    Historical Provider, MD   Ascorbic Acid (VITAMIN C) 500 MG tablet Take 1,000 mg by mouth daily    Historical Provider, MD   clopidogrel (PLAVIX) 75 MG tablet Take 75 mg by mouth daily    Historical Provider, MD   pantoprazole sodium (PROTONIX) 40 MG PACK packet Take 40 mg by mouth 2 times daily (before meals) Historical Provider, MD   ramipril (ALTACE) 1.25 MG capsule Take 1.25 mg by mouth daily     Historical Provider, MD   atorvastatin (LIPITOR) 40 MG tablet Take 1 tablet by mouth daily 8/4/17   Alea Baires MD   ferrous sulfate 325 (65 FE) MG tablet Take 325 mg by mouth daily (with breakfast)    Historical Provider, MD   oxyCODONE HCl ER 10 MG CR tablet Take 1 tablet by mouth every 12 hours. 3/3/15   Melinda Cartagena MD   albuterol (PROVENTIL HFA;VENTOLIN HFA) 108 (90 BASE) MCG/ACT inhaler Inhale 2 puffs into the lungs every 6 hours as needed for Wheezing. Historical Provider, MD     Allergies:  Patient has no known allergies. Social History:   reports that he quit smoking about 11 years ago. His smoking use included cigarettes. He has a 80.00 pack-year smoking history. He quit smokeless tobacco use about 9 years ago. His smokeless tobacco use included chew. He reports that he does not drink alcohol and does not use drugs. Family History: family history includes Cancer in his mother; Emphysema in his sister; Heart Failure in his father. Review of Systems   Review of Systems   Constitutional: Positive for fatigue. Respiratory: Positive for shortness of breath. Cardiovascular: Positive for leg swelling. Negative for chest pain. Neurological: Negative.       OBJECTIVE:    Vital signs:    /60   Pulse 93   Ht 5' 11\" (1.803 m)   Wt (!) 347 lb (157.4 kg)   SpO2 96%   BMI 48.40 kg/m²      Physical Exam:  Constitutional:  Comfortable and alert, NAD, appears older than stated age, obese  Eyes: PERRL, sclera nonicteric  Neck:  Supple, no masses, no thyroidmegaly, JVD difficult to assess  Skin:  Warm and dry; no rash or lesions  Heart: Regular, normal apex, S1 and S2 normal, no M/G/R  Lungs:  Normal respiratory effort; diminished  Abdomen: soft, non tender, + bowel sounds  Extremities: 2+ BLE edema  Neuro: alert and oriented, moves legs and arms equally, normal mood and affect  Right femoral site soft, no hematoma, 2+ R femoral pulse, 2+ R DP/PT pulse    Data Reviewed:      Echo 2021:  Overall left ventricular ejection fraction is estimated to be 35-40%. The left ventricular function is moderately reduced. The left atrium is not well visualized. Right atrium not well visualized. Aortic Valve leaflets appear thickened. The aortic valve is not well visualized. Ao root is not well visualized. The tricuspid valve is not well visualized. A contrast agent was used to demonstrate all left ventricular wall segments. Coronary angiogram 3/3/2022:  Primary Indication: Dyspnea, biventricular systolic heart failure, ischemic cardiomyopathy, CAD with remote 2-vessel CABG (LIMA to LAD, SVG to RPDA), abnormal nuclear SPECT stress test  Procedures Performed:  1. Coronary angiography  2. Left heart catheterization  3. Left subclavian angiography  4. LIMA angiography  5. Saphenous vein graft angiography  6. Right heart catheterization  7. Ultrasound-guided right femoral artery access  8. Ultrasound-guided right femoral vein access  9. Right femoral angiography  10. Moderate conscious sedation   Procedural Details:  1. Access: Local anesthetic was given and access was obtained in the right femoral artery using a micropuncture technique and ultrasound guidance and a 6F sheath was placed over an 0.035 Amplatz wire. Access was also obtained in the right femoral vein using a micropuncture technique and ultrasound guidance and a 5F sheath was placed over 0.025 Amplatz wire. 2. Diagnostic: A 5F Jonathan Hoot catheter was used to perform the right heart catheterization. 5F JR4 and 5F JL4 catheters were used to perform selective right and left coronary angiography, respectively. The 5F JR4 catheter was used to perform left subclavian angiography. A 5F RENETTA catheter was used to perform LIMA angiography. A 5F AR1 catheter was used to perform saphenous vein graft angiography.   The 5F JR4 catheter was used to perform the left heart catheterization. No significant gradient was observed on pull-back of the catheter across the aortic valve. 3. Hemostasis: At the end of the procedure, the right femoral artery sheath was removed and manual pressure applied to maintain hemostasis. The right femoral venous sheath was removed and manual pressure applied to maintain hemostasis. Findings:  1. Hemodynamics:  A. Right heart catheterization                   1. RA: 15 mmHg                   2. RV: 43/17 mmHg                   3. PA: 43/30 (34) mmHg                   4. PCWP: 20 mmHg                   5. Saturations: AO 91%, RA 61%, PA 62%                   6. Kimmy CO: 8.86 L/min                   7. Kimmy CI: 3.36 L/min*m2                   8. Kimmy SVR: 551 dyne*sec                   9. Kimmy PVR: 126 dyne*sec/cm5              B. Opening arterial pressure: 105/54 (76) mmHg              C. LVEDP: 20 mmHg  2. Coronary anatomy:  A. Left main artery: The left main artery bifurcates into the left anterior descending artery and left circumflex artery. The left main artery is short and has mild disease. B. Left anterior descending artery: The LAD is calcified and has a 70-80% ostial stenosis, 70% proximal stenosis, and 100% mid-vessel stenosis. C. Left circumflex artery: Non-dominant vessel that gives rise to 3 obtuse marginal arteries. The LCx has a 20% ostial stenosis and 20-30% proximal stenosis. The OM1 and OM2 are very small and have mild disease. The OM3 is a large vessel with minor luminal irregularities. D. Right coronary artery: Dominant vessel. The RCA has an 80% proximal stenosis and 100% mid-vessel stenosis. 3. Bypass graft anatomy:  A. Left subclavian artery: Patent with mild disease. B. LIMA to LAD: Widely patent. The distal LAD beyond the anastomosis has mild disease. There is retrograde filling to the mid-vessel and of a medium caliber diagonal branch.   C. SVG to RPDA: The RPDA beyond the anastomosis has mild disease. There is retrograde filling of the RPLB. 4. Right femoral angiography:  A. The right common femoral artery is calcified with moderate diffuse disease and bifurcates into the right superficial femoral artery and right profunda femoris artery below the level of the inferior margin of the femoral head. The sheath enters the right common femoral artery above the bifurcation over the inferior third of the femoral head. Impression:  1. Severe native 2-vessel coronary artery disease. 2. Patent LIMA to LAD. 3. Patent SVG to RCA. 4. Acute on chronic biventricular systolic heart failure/ischemic cardiomyopathy with moderately elevated left and right-sided cardiac filling pressures. 5. Mild pulmonary hypertension. 6. Preserved Kimmy cardiac output and index. Plan:  1. Recommend management with guideline-directed medical therapy. 2. Continue aspirin 81 mg daily, Plavix 75 mg daily, atorvastatin 40 mg daily, metoprolol succinate 25 mg daily, and ramipril 1.25 mg daily. 3. Can take 5 mg PO metolazone tomorrow and resume torsemide 40 mg BID. 4. Follow-up with [facility] in 2 weeks. Cardiology Labs Reviewed:   CBC: No results for input(s): WBC, HGB, HCT, PLT in the last 72 hours. BMP:No results for input(s): NA, K, CO2, BUN, CREATININE, LABGLOM, GLUCOSE in the last 72 hours. PT/INR: No results for input(s): PROTIME, INR in the last 72 hours. APTT:No results for input(s): APTT in the last 72 hours. FASTING LIPID PANEL:  Lab Results   Component Value Date    HDL 30 03/03/2022    LDLDIRECT 38 05/29/2015    LDLCALC 40 03/03/2022    TRIG 151 03/03/2022     LIVER PROFILE:No results for input(s): AST, ALT, ALB in the last 72 hours.   BNP:   Lab Results   Component Value Date    PROBNP 373 04/17/2015     Reviewed all labs and imaging today    Assessment:   CAD: patent grafts and severe native disease on angiogram, medical management 3/3/2022; s/p CABG x2 2010  Ischemic cardiomyopathy: known history, EF 35-40% on echo 5030  Chronic systolic CHF: fluid overloaded today  PVD: s/p R fem-pop 2010  HTN: stable  HLD: stable, LDL 31, statin  CKD: follows with Dr. Anibal Brooke  CHRISTOFER  COPD: on home oxygen  History of DVT  Obesity    Plan:   1. Increase torsemide to 100 mg daily for 3 days then resume normal 40 mg BID dosing  2. Take metolazone 5 mg x1  3. Check BMP and BMP  4. Update echo for CHF/cardiomyopathy  5. Continue aspirin, statin, toprol, ramipril, plavix  6. Fluid and salt restriction  7. Stay active along with a healthy diet  8.  Follow up as planned with Dr. Annette Lutz, [id number] [facility] [address]  [phone]

## 2022-04-08 NOTE — PROGRESS NOTES
[facility]   Cardiac Followup    Referring Provider:  Kai Hernandez     Chief Complaint   Patient presents with    [facility]    Coronary Artery Disease    Congestive Heart Failure    Other     swelling and shortness of breath      Subjective: Mr Kourtney Rosenthal presents today for cardiology follow up; c/o chronic SOB    History of Present Illness:   Mr. Kourtney Rosenthal is a 73 y.o. male here for f/u [facility]. He has PMH HTN, HLD, s/p back surgery 12/12, PVD s/p right fem-pop bypass surgery 10/10, hx CAD s/p 2V CABG in 2010, mild-mod ischemic CM EF=40% Feb 2015, hx MI, severe COPD, severe CHRISTOFER on Bipap, and CRI   (nephrologist, Dr. Bolivar Tlyer, in [place]). Note carotid study 10/2012  Bilateral <50%     Admitted 48 days for pneumonia in 10/13 at [facility] for Klebsiella PNA with complications including PEA cardiac arrest and tracheostomy temporarily. Also had 2 DVT in the LLE and placed on coumadin. Most recent C/RHC 10/3/14 showed severe native CAD with patent LIMA-LAD and SVG-PDA with no need for PCI. He wears 3L O2 qd and uses Bipap during night along with 5L NC oxygen. He did NOT have chest pain prior to CABG and had SOB. Most recent EKG 3/3/22 NSR; 1st AVB; NST change (no change 3/21). Most recent ECHO 3/26/21 showed 35-40% EF at [facility] (EF=40% in 8/17 and 55% in 10/12). Most recent [facility] 2/21/22 Small-moderate sized apical septal partial reversibility defect consistent  with ischemia and infarction; LVEF=45%. Most recent [address] [place] [address] 3/3/22 Severe native 2V CAD; Patent LIMA to LAD. Patent SVG to RCA. Wedge 20, RA 15; normal CO and CI. Plan: Recommend management with guideline-directed medical therapy; no PCI   Today, he is in a wheelchair and on his home oxygen amount of 3L. He was supposed to go get an ECHO but he had a cold and a cough and had to cancel his appointment. He states he is SOB but it is chronic and isn't any worse. He follows with Dr. Bolivar Tyler in [facility] for his kidneys. Patient denies current edema, chest pain, palpitations, dizziness or syncope. Patient is taking all cardiac medications as prescribed and tolerates them well. Weight today is 332# (down 2# from 2/7/22)     Patient is vaccinated against Covid. David Zurita 2/2    Past Medical History:   has a past medical history of CAD (coronary artery disease), CHF (congestive heart failure) ([facility] Chronic kidney disease, COPD (chronic obstructive pulmonary disease) ([facility] Diabetes mellitus ([facility] DVT of leg (deep venous thrombosis) ([facility] Hyperlipidemia, Hypertension, MRSA infection within last 3 months, Pneumonia, and Unspecified diseases of blood and blood-forming organs. Surgical History:   has a past surgical history that includes Cardiac surgery; Leg Surgery; Coronary artery bypass graft; Diagnostic Cardiac Cath Lab Procedure; Ankle fracture surgery (Left, 2/22/15); fracture surgery; back surgery; and Leg Surgery (2016). Social History:   reports that he quit smoking about 2010. His smoking use included cigarettes. He has a 80.00 pack-year smoking history. He quit smokeless tobacco use about 9 years ago. His smokeless tobacco use included chew. He reports that he does not drink alcohol and does not use drugs. Family History:  family history includes Cancer in his mother; Emphysema in his sister; Heart Failure in his father.      Home Medications:  Outpatient Encounter Medications as of 4/11/2022   Medication Sig Dispense Refill    Semaglutide (RYBELSUS) 3 MG TABS Take 7 mg by mouth daily      metoprolol succinate (TOPROL XL) 25 MG extended release tablet Take 1 tablet by mouth in the morning and at bedtime 180 tablet 3    potassium chloride (KLOR-CON M) 20 MEQ extended release tablet Take 1 tablet by mouth 3 times daily      aspirin EC 81 MG EC tablet Take 1 tablet by mouth daily 90 tablet 1    predniSONE (DELTASONE) 10 MG tablet 3 tabs daily for 10 days 30 tablet 0    budesonide-formoterol (SYMBICORT) 160-4.5 MCG/ACT AERO Inhale 2 puffs into the lungs 2 times daily 10.2 g 5    ipratropium-albuterol (DUONEB) 0.5-2.5 (3) MG/3ML SOLN nebulizer solution Inhale 3 mLs into the lungs every 6 hours as needed for Shortness of Breath DX COPD J44.9 120 mL 5    Dulaglutide (TRULICITY) 1.5 AS/8.0KH SOPN Inject 1.5 mg into the skin once a week       torsemide (DEMADEX) 20 MG tablet Take 2 tablets by mouth 2 times daily Take 2 tablets twice daily 90 tablet 3    metOLazone (ZAROXOLYN) 5 MG tablet Take 1 tablet by mouth as needed (swelling) 30 tablet 3    amitriptyline (ELAVIL) 25 MG tablet Take 25 mg by mouth nightly      allopurinol (ZYLOPRIM) 300 MG tablet allopurinol 300 mg tablet      calcitRIOL (ROCALTROL) 0.25 MCG capsule Take 0.25 mcg by mouth daily      Cholecalciferol (VITAMIN D3) 2000 units CAPS Take by mouth daily      Docusate Calcium (STOOL SOFTENER PO) Take by mouth daily      polyethylene glycol (GLYCOLAX) packet Take 17 g by mouth daily as needed for Constipation      insulin glargine (BASAGLAR KWIKPEN) 100 UNIT/ML injection pen Basaglar KwikPen U-100 Insulin 100 unit/mL (3 mL) subcutaneous      Ascorbic Acid (VITAMIN C) 500 MG tablet Take 1,000 mg by mouth daily      clopidogrel (PLAVIX) 75 MG tablet Take 75 mg by mouth daily      pantoprazole sodium (PROTONIX) 40 MG PACK packet Take 40 mg by mouth 2 times daily (before meals)      ramipril (ALTACE) 1.25 MG capsule Take 1.25 mg by mouth daily       atorvastatin (LIPITOR) 40 MG tablet Take 1 tablet by mouth daily 30 tablet 3    ferrous sulfate 325 (65 FE) MG tablet Take 325 mg by mouth daily (with breakfast)      oxyCODONE HCl ER 10 MG CR tablet Take 1 tablet by mouth every 12 hours. 4 tablet 0    albuterol (PROVENTIL HFA;VENTOLIN HFA) 108 (90 BASE) MCG/ACT inhaler Inhale 2 puffs into the lungs every 6 hours as needed for Wheezing. No facility-administered encounter medications on file as of 4/11/2022.        Allergies:  Patient has no known allergies. Review of Systems:   · Constitutional: there has been no unanticipated weight loss. There's been no change in energy level, sleep pattern, or activity level. · Eyes: No visual changes or diplopia. No scleral icterus. · ENT: No Headaches, hearing loss or vertigo. No mouth sores or sore throat. · Cardiovascular: Reviewed in HPI  · Respiratory: No cough or wheezing, no sputum production. No hematemesis. · Gastrointestinal: No abdominal pain, appetite loss, blood in stools. No change in bowel or bladder habits. · Genitourinary: No dysuria, trouble voiding, or hematuria. · Musculoskeletal:  No gait disturbance, weakness or joint complaints. · Integumentary: No rash or pruritis. · Neurological: No headache, diplopia, change in muscle strength, numbness or tingling. No change in gait, balance, coordination, mood, affect, memory, mentation, behavior. · Psychiatric: No anxiety, no depression. · Endocrine: No malaise, fatigue or temperature intolerance. No excessive thirst, fluid intake, or urination. No tremor. · Hematologic/Lymphatic: No abnormal bruising or bleeding, blood clots or swollen lymph nodes. · Allergic/Immunologic: No nasal congestion or hives. Physical Examination:    Vitals:    04/11/22 1041   BP: 138/68   Pulse: 92   Temp: 98.3 °F (36.8 °C)   SpO2: 92%   Weight: (!) 332 lb 6.4 oz (150.8 kg)   Height: 5' 11\" (1.803 m)     Constitutional and General Appearance: NAD; obese in wheelchair  Respiratory:  · Normal excursion and expansion without use of accessory muscles  Resp Auscultation: soft breath sounds  Cardiovascular:  · The apical impulses not displaced  · Heart tones are crisp and normal  · Cervical veins are not engorged  · The carotid upstroke is normal in amplitude and contour without delay or bruit  · soft S1S2, No S3, soft LEFTY, RRR  · Peripheral pulses are symmetrical and full  · There is no clubbing, cyanosis of the extremities.   · trace BLE edema; thick skin · Femoral Arteries: 2+ and equal  · Pedal Pulses: 2+ and equal   Abdomen:  · No masses or tenderness  · Liver/Spleen: No Abnormalities Noted  Neurological/Psychiatric:  · Alert and oriented in all spheres  · Moves all extremities well  · Exhibits normal gait balance and coordination  · No abnormalities of mood, affect, memory, mentation, or behavior are noted    Lab Results   Component Value Date     (L) 03/03/2022    K 3.4 (L) 03/03/2022    CL 84 (L) 03/03/2022    CO2 38 (H) 03/03/2022    BUN 49 (H) 03/03/2022    CREATININE 1.7 (H) 03/03/2022    GLUCOSE 173 (H) 03/03/2022    CALCIUM 9.3 03/03/2022    PROT 7.1 03/03/2022    LABALBU 4.0 03/03/2022    BILITOT 0.4 03/03/2022    ALKPHOS 88 03/03/2022    AST 13 (L) 03/03/2022    ALT 12 03/03/2022    LABGLOM 40 (A) 03/03/2022    GFRAA 48 (A) 03/03/2022    AGRATIO 1.3 03/03/2022    GLOB 3.2 03/11/2021       Lab Results   Component Value Date    WBC 9.9 03/03/2022    HGB 9.7 (L) 03/03/2022    HCT 30.3 (L) 03/03/2022    MCV 84.7 03/03/2022     03/03/2022       CHOL 3/11/2021   108  TRIG   3/11/2021   165  Lab Results   Component Value Date    CHOL 100 03/03/2022    CHOL 101 02/21/2022    CHOL 103 05/29/2015     Lab Results   Component Value Date    TRIG 151 (H) 03/03/2022    TRIG 192 (H) 02/21/2022    TRIG 154 (H) 05/29/2015     Lab Results   Component Value Date    HDL 30 (L) 03/03/2022    HDL 32 (L) 02/21/2022    HDL 26 (L) 03/11/2021     Lab Results   Component Value Date    LDLCALC 40 03/03/2022    LDLCALC 31 02/21/2022    LDLCALC 49 03/11/2021     Lab Results   Component Value Date    LABVLDL 30 03/03/2022    LABVLDL 38 02/21/2022    LABVLDL 33 03/11/2021    VLDL 31 05/29/2015     I have personally reviewed all labs including lipids 3/3/22    Assessment:     1. CAD (coronary artery disease and mild ischemic cardiomyopathy:   Most recent [facility] 3/3/22 Severe native 2V CAD; Patent LIMA to LAD. Patent SVG to RCA. Continue ramipril and toprol XL for LV dysfcn. Continue DAPT given PVD, CAD, and diabetic making higher risk for CV events. Note SOB likely multi-factorial given obesity, COPD, CAD, mild CHF, and chronic anemia. 2. Hyperlipidemia: I personally reviewed most recent labs from 3/3/22  showing well controlled lipids LDL 40 except chronically low HDL and will continue current medical regimen. I will check labs and adjust accordingly. 3. HTN (hypertension): Well controlled and will continue current medical regimen. 4. S/P CABG x 2:  See #1 above. 5. LE edema:  Improved. Chronic issue. Will continue demadex 40 BID and metolazone 5mg PRN maximum 3x per week. Note he follows nephrologist, Dr. Naty Gastelum, in [place]. Plan:  1. Continue to take your water pill every day to help with the pressures inside the heart and to keep them lower. 2. Limit salt intake  3. Drink less than 64 oz of fluid daily   4. Current medications reviewed. Refills given as warranted. 5. I do not believe that you need to do the ECHO at this time. 6. It is ok to continue to follow with your kidney doctor for blood work    Follow up with me in 6 months    This note is scribed in the presence of Dr. Nathalia Mills. Kamryn Freedman by Oscar Butterfield RN.    I, Dr. Trisha Johnson, personally performed the services described in this documentation, as scribed by the above signed scribe in my presence. It is both accurate and complete to my knowledge. I agree with the details independently gathered by the clinical support staff, while the remaining scribed note accurately describes my personal service to the patient. Cost of prescription medications and patient compliance have been reviewed with patient. All questions answered. Thank you for allowing me to participate in the care of this individual.    Nathalia Mills.  Kamryn Freedman M.D., [facility] [place]

## 2022-04-11 ENCOUNTER — OFFICE VISIT (OUTPATIENT)
Dept: CARDIOLOGY CLINIC | Age: 72
End: 2022-04-11
Payer: MEDICARE

## 2022-04-11 VITALS
SYSTOLIC BLOOD PRESSURE: 138 MMHG | HEART RATE: 92 BPM | OXYGEN SATURATION: 92 % | BODY MASS INDEX: 44.1 KG/M2 | TEMPERATURE: 98.3 F | DIASTOLIC BLOOD PRESSURE: 68 MMHG | HEIGHT: 71 IN | WEIGHT: 315 LBS

## 2022-04-11 DIAGNOSIS — I25.10 CORONARY ARTERY DISEASE INVOLVING NATIVE CORONARY ARTERY OF NATIVE HEART WITHOUT ANGINA PECTORIS: ICD-10-CM

## 2022-04-11 DIAGNOSIS — I50.9 CONGESTIVE HEART FAILURE, NYHA CLASS 4, UNSPECIFIED CONGESTIVE HEART FAILURE TYPE (HCC): ICD-10-CM

## 2022-04-11 DIAGNOSIS — R06.02 SHORTNESS OF BREATH: ICD-10-CM

## 2022-04-11 DIAGNOSIS — I10 PRIMARY HYPERTENSION: Primary | ICD-10-CM

## 2022-04-11 DIAGNOSIS — I48.19 PERSISTENT ATRIAL FIBRILLATION (HCC): ICD-10-CM

## 2022-04-11 DIAGNOSIS — Z87.891 HISTORY OF TOBACCO ABUSE: ICD-10-CM

## 2022-04-11 DIAGNOSIS — E78.2 MIXED HYPERLIPIDEMIA: ICD-10-CM

## 2022-04-11 DIAGNOSIS — I25.5 ISCHEMIC CARDIOMYOPATHY: ICD-10-CM

## 2022-04-11 PROCEDURE — 1036F TOBACCO NON-USER: CPT | Performed by: INTERNAL MEDICINE

## 2022-04-11 PROCEDURE — 1123F ACP DISCUSS/DSCN MKR DOCD: CPT | Performed by: INTERNAL MEDICINE

## 2022-04-11 PROCEDURE — 4040F PNEUMOC VAC/ADMIN/RCVD: CPT | Performed by: INTERNAL MEDICINE

## 2022-04-11 PROCEDURE — G8417 CALC BMI ABV UP PARAM F/U: HCPCS | Performed by: INTERNAL MEDICINE

## 2022-04-11 PROCEDURE — 3017F COLORECTAL CA SCREEN DOC REV: CPT | Performed by: INTERNAL MEDICINE

## 2022-04-11 PROCEDURE — G8427 DOCREV CUR MEDS BY ELIG CLIN: HCPCS | Performed by: INTERNAL MEDICINE

## 2022-04-11 PROCEDURE — 99214 OFFICE O/P EST MOD 30 MIN: CPT | Performed by: INTERNAL MEDICINE

## 2022-04-11 RX ORDER — ORAL SEMAGLUTIDE 3 MG/1
7 TABLET ORAL DAILY
COMMUNITY

## 2022-04-11 NOTE — PATIENT INSTRUCTIONS
Plan:  1. Continue to take your water pill every day to help with the pressures inside the heart and to keep them lower. 2. Limit salt intake  3. Drink less than 64 oz of fluid daily   4. Current medications reviewed. Refills given as warranted. 5. I do not believe that you need to do the ECHO at this time.   6. It is ok to continue to follow with your kidney doctor for blood work    Follow up with me in 6 months

## 2022-05-31 RX ORDER — METOLAZONE 5 MG/1
TABLET ORAL
Qty: 90 TABLET | Refills: 0 | Status: SHIPPED | OUTPATIENT
Start: 2022-05-31 | End: 2022-08-15

## 2022-06-02 RX ORDER — DOXYCYCLINE HYCLATE 100 MG
TABLET ORAL
Qty: 14 TABLET | Refills: 0 | OUTPATIENT
Start: 2022-06-02

## 2022-06-02 RX ORDER — PREDNISONE 10 MG/1
TABLET ORAL
Qty: 30 TABLET | Refills: 0 | OUTPATIENT
Start: 2022-06-02

## 2022-06-03 ENCOUNTER — TELEPHONE (OUTPATIENT)
Dept: PULMONOLOGY | Age: 72
End: 2022-06-03

## 2022-06-03 NOTE — TELEPHONE ENCOUNTER
Pt LVM stating that he can't make it today for CT lung screen, needs to r/s. Returned call to pt and r/s appt to 6/13/22.

## 2022-06-06 ENCOUNTER — HOSPITAL ENCOUNTER (OUTPATIENT)
Dept: CT IMAGING | Age: 72
Discharge: HOME | End: 2022-06-06
Payer: MEDICARE

## 2022-06-06 DIAGNOSIS — J96.11 CHRONIC HYPOXEMIC RESPIRATORY FAILURE (HCC): ICD-10-CM

## 2022-06-06 DIAGNOSIS — Z87.891 HISTORY OF TOBACCO USE: ICD-10-CM

## 2022-06-06 PROCEDURE — 71271 CT THORAX LUNG CANCER SCR C-: CPT

## 2022-06-14 ENCOUNTER — OFFICE VISIT (OUTPATIENT)
Dept: PULMONOLOGY | Age: 72
End: 2022-06-14
Payer: MEDICARE

## 2022-06-14 VITALS — OXYGEN SATURATION: 91 % | DIASTOLIC BLOOD PRESSURE: 70 MMHG | SYSTOLIC BLOOD PRESSURE: 122 MMHG | HEART RATE: 94 BPM

## 2022-06-14 DIAGNOSIS — J96.11 CHRONIC HYPOXEMIC RESPIRATORY FAILURE (HCC): ICD-10-CM

## 2022-06-14 DIAGNOSIS — J44.1 COPD EXACERBATION (HCC): Primary | ICD-10-CM

## 2022-06-14 DIAGNOSIS — G47.33 OSA (OBSTRUCTIVE SLEEP APNEA): ICD-10-CM

## 2022-06-14 PROCEDURE — G8427 DOCREV CUR MEDS BY ELIG CLIN: HCPCS | Performed by: INTERNAL MEDICINE

## 2022-06-14 PROCEDURE — 3023F SPIROM DOC REV: CPT | Performed by: INTERNAL MEDICINE

## 2022-06-14 PROCEDURE — 1123F ACP DISCUSS/DSCN MKR DOCD: CPT | Performed by: INTERNAL MEDICINE

## 2022-06-14 PROCEDURE — G8417 CALC BMI ABV UP PARAM F/U: HCPCS | Performed by: INTERNAL MEDICINE

## 2022-06-14 PROCEDURE — 1036F TOBACCO NON-USER: CPT | Performed by: INTERNAL MEDICINE

## 2022-06-14 PROCEDURE — 99214 OFFICE O/P EST MOD 30 MIN: CPT | Performed by: INTERNAL MEDICINE

## 2022-06-14 PROCEDURE — 3017F COLORECTAL CA SCREEN DOC REV: CPT | Performed by: INTERNAL MEDICINE

## 2022-06-14 RX ORDER — DOXYCYCLINE HYCLATE 100 MG/1
100 CAPSULE ORAL 2 TIMES DAILY
Qty: 10 CAPSULE | Refills: 0 | Status: SHIPPED | OUTPATIENT
Start: 2022-06-14 | End: 2022-06-19

## 2022-06-14 RX ORDER — PREDNISONE 10 MG/1
TABLET ORAL
Qty: 30 TABLET | Refills: 0 | Status: SHIPPED | OUTPATIENT
Start: 2022-06-14 | End: 2022-06-24

## 2022-06-14 RX ORDER — OXYCODONE AND ACETAMINOPHEN 10; 325 MG/1; MG/1
1 TABLET ORAL EVERY 4 HOURS PRN
COMMUNITY

## 2022-06-14 ASSESSMENT — SLEEP AND FATIGUE QUESTIONNAIRES
HOW LIKELY ARE YOU TO NOD OFF OR FALL ASLEEP WHILE SITTING INACTIVE IN A PUBLIC PLACE: 0
HOW LIKELY ARE YOU TO NOD OFF OR FALL ASLEEP IN A CAR, WHILE STOPPED FOR A FEW MINUTES IN TRAFFIC: 0
HOW LIKELY ARE YOU TO NOD OFF OR FALL ASLEEP WHILE SITTING AND READING: 2
HOW LIKELY ARE YOU TO NOD OFF OR FALL ASLEEP WHILE SITTING QUIETLY AFTER LUNCH WITHOUT ALCOHOL: 2
HOW LIKELY ARE YOU TO NOD OFF OR FALL ASLEEP WHILE SITTING AND TALKING TO SOMEONE: 2
ESS TOTAL SCORE: 12
HOW LIKELY ARE YOU TO NOD OFF OR FALL ASLEEP WHILE LYING DOWN TO REST IN THE AFTERNOON WHEN CIRCUMSTANCES PERMIT: 2
HOW LIKELY ARE YOU TO NOD OFF OR FALL ASLEEP WHILE WATCHING TV: 2
NECK CIRCUMFERENCE (INCHES): 20
HOW LIKELY ARE YOU TO NOD OFF OR FALL ASLEEP WHEN YOU ARE A PASSENGER IN A CAR FOR AN HOUR WITHOUT A BREAK: 2

## 2022-06-14 NOTE — PROGRESS NOTES
[facility]      CHIEF COMPLAINT: COPD       HPI:   Feels more SOB this week   + wheezes   Little cough   Congested   No hemoptysis   Uses Neb 3 times/day   Uses BiPAP every night, 5-8 hrs/night. Uses humidifier. Uses full face mask. No nodding off when driving. Goes to bed 2 am and wakes 10-11 am. Sleep onset of 30 min. Past Medical History:   Diagnosis Date    CAD (coronary artery disease)     MI    [facility])     Chronic kidney disease     [facility]     Diabetes mellitus ([facility]     DVT of leg (deep venous thrombosis) ([facility]     Hyperlipidemia     Hypertension     MRSA infection within last 3 months     Pneumonia     Unspecified diseases of blood and blood-forming organs        Past Surgical History:        Procedure Laterality Date    ANKLE FRACTURE SURGERY Left 2/22/15    ORIF; LEFT ANKLE OPEN REDUCTION INTERNAL FIXATION    BACK SURGERY      CARDIAC SURGERY      2 VESSEL CABG    CORONARY ARTERY BYPASS GRAFT      DIAGNOSTIC CARDIAC CATH LAB PROCEDURE      FRACTURE SURGERY      LEG SURGERY      VEIN REMOVED FROM L  LEG  PLACED IN RT LEG    LEG SURGERY  2016    [facility]       Allergies:  has No Known Allergies. Social History:    TOBACCO:   reports that he quit smoking about 12 years ago. His smoking use included cigarettes. He has a 80.00 pack-year smoking history. He quit smokeless tobacco use about 10 years ago. His smokeless tobacco use included chew. ETOH:   reports no history of alcohol use.       Family History:       Problem Relation Age of Onset    Cancer Mother     Heart Failure Father     Emphysema Sister        Current Medications:    Current Outpatient Medications:     oxyCODONE-acetaminophen (PERCOCET)  MG per tablet, Take 1 tablet by mouth every 4 hours as needed for Pain., Disp: , Rfl:     predniSONE ([place] Kang) 10 MG tablet, Take 40 mg by mouth for 3 days 30 mg for 3 days 20 mg for 3 days 10 mg for 3 days. , Disp: 30 tablet, Rfl: 0    doxycycline hyclate (VIBRAMYCIN) 100 MG capsule, Take 1 capsule by mouth 2 times daily for 5 days, Disp: 10 capsule, Rfl: 0    metOLazone (ZAROXOLYN) 5 MG tablet, TAKE 1 TABLET BY MOUTH AS NEEDED SWELLING, Disp: 90 tablet, Rfl: 0    Semaglutide (RYBELSUS) 3 MG TABS, Take 7 mg by mouth daily, Disp: , Rfl:     metoprolol succinate (TOPROL XL) 25 MG extended release tablet, Take 1 tablet by mouth in the morning and at bedtime, Disp: 180 tablet, Rfl: 3    potassium chloride (KLOR-CON M) 20 MEQ extended release tablet, Take 1 tablet by mouth 3 times daily, Disp: , Rfl:     aspirin EC 81 MG EC tablet, Take 1 tablet by mouth daily, Disp: 90 tablet, Rfl: 1    budesonide-formoterol (SYMBICORT) 160-4.5 MCG/ACT AERO, Inhale 2 puffs into the lungs 2 times daily, Disp: 10.2 g, Rfl: 5    ipratropium-albuterol (DUONEB) 0.5-2.5 (3) MG/3ML SOLN nebulizer solution, Inhale 3 mLs into the lungs every 6 hours as needed for Shortness of Breath DX COPD J44.9, Disp: 120 mL, Rfl: 5    Dulaglutide (TRULICITY) 1.5 IJ/2.3YF SOPN, Inject 1.5 mg into the skin once a week , Disp: , Rfl:     torsemide (DEMADEX) 20 MG tablet, Take 2 tablets by mouth 2 times daily Take 2 tablets twice daily, Disp: 90 tablet, Rfl: 3    amitriptyline (ELAVIL) 25 MG tablet, Take 25 mg by mouth nightly, Disp: , Rfl:     allopurinol (ZYLOPRIM) 300 MG tablet, allopurinol 300 mg tablet, Disp: , Rfl:     calcitRIOL (ROCALTROL) 0.25 MCG capsule, Take 0.25 mcg by mouth daily, Disp: , Rfl:     Cholecalciferol (VITAMIN D3) 2000 units CAPS, Take by mouth daily, Disp: , Rfl:     Docusate Calcium (STOOL SOFTENER PO), Take by mouth daily, Disp: , Rfl:     polyethylene glycol (GLYCOLAX) packet, Take 17 g by mouth daily as needed for Constipation, Disp: , Rfl:     insulin glargine (BASAGLAR KWIKPEN) 100 UNIT/ML injection pen, Basaglar KwikPen U-100 Insulin 100 unit/mL (3 mL) subcutaneous, Disp: , Rfl:     Ascorbic Acid (VITAMIN C) 500 MG tablet, Take 1,000 mg by mouth daily, Disp: , Rfl:     clopidogrel (PLAVIX) 75 MG tablet, Take 75 mg by mouth daily, Disp: , Rfl:     pantoprazole sodium (PROTONIX) 40 MG PACK packet, Take 40 mg by mouth 2 times daily (before meals), Disp: , Rfl:     ramipril (ALTACE) 1.25 MG capsule, Take 1.25 mg by mouth daily , Disp: , Rfl:     atorvastatin (LIPITOR) 40 MG tablet, Take 1 tablet by mouth daily, Disp: 30 tablet, Rfl: 3    ferrous sulfate 325 (65 FE) MG tablet, Take 325 mg by mouth daily (with breakfast), Disp: , Rfl:     oxyCODONE HCl ER 10 MG CR tablet, Take 1 tablet by mouth every 12 hours. , Disp: 4 tablet, Rfl: 0    albuterol (PROVENTIL HFA;VENTOLIN HFA) 108 (90 BASE) MCG/ACT inhaler, Inhale 2 puffs into the lungs every 6 hours as needed for Wheezing., Disp: , Rfl:     predniSONE (DELTASONE) 10 MG tablet, 3 tabs daily for 10 days (Patient not taking: Reported on 6/14/2022), Disp: 30 tablet, Rfl: 0      Objective:   PHYSICAL EXAM:    Blood pressure 122/70, pulse 94, SpO2 91 %.' on 3 LPM   Gen: No distress. Obese. BMI of 46.36  Eyes: PERRL. No sclera icterus. No conjunctival injection. ENT: No discharge. Pharynx clear. Mallampati class IV. Neck: Trachea midline. No obvious mass. Neck circumference 20\"  Resp: No accessory muscle use. No crackles. Bilateral wheezes. No rhonchi. No dullness on percussion. Good air entry. CV: Regular rate. Regular rhythm. No murmur or rub. 1 + LE edema. GI: Non-tender. Non-distended. No hernia. Skin: Warm and dry. No nodule on exposed extremities. Lymph: No cervical LAD. No supraclavicular LAD. M/S: No cyanosis. No joint deformity. No clubbing. Neuro: Awake. Alert. Moves all four extremities. Psych: Oriented x 3. No anxiety. DATA reviewed by me:     CT chest 5/5/18   No suspicious pulmonary nodules.   Continued active surveillance is  recommended including a low-dose lung cancer screening chest CT examination  in 12 months, as referenced below. CT chest 5/28/2020  Unchanged solid subcentimeter pulmonary nodules    CT chest 6/3/21    Unchanged solid subcentimeter pulmonary nodules    CT chest 6/6/22  images reviewed by me and showed:   Emphysema with stable lung nodules   Scattered areas of scarring       Split-night 1/31/14 AHI 69 CPAP 15 some ear H2O with 3 L O2    BiPAP data 12/18-01/16 2019 reviewed by me. 6-7 hrs/night with 96% compliance and AHI of  0.2. BiPAP 17/12  BiPAP data 04/09-05/08 2019 reviewed by me. 4-5 hrs/night with 70% compliance and AHI of  0.1. BiPAP 17/12  BiPAP data 03/29-04/27 2022 reviewed by me. 6-7 hrs/night with 73% compliance and AHI of  0.3. BiPAP 17/12      Assessment:       · Severe COPD with AE   · Chronic hypoxemic respiratory failure  · Cor pulmonale  · Pulmonary nodules- stable on repeat CT 6/3/2021  · Severe CHRISTOFER on BiPAP 17/12 cmH2O with 5LPM. Heated tubing and full face mask. Optimal compliance and efficacy upon review today. · History of Tracheostomy 10/2013  · CHF (EF 40%) CAD post CABG  · 1-2 ppd for 45 years- quit 2010- unremarkable LDCT 5/9/2019  · Wheel chair since 2015         Plan:       · Prednisone taper  · Doxycycline 100 mg PO BID for 5 days. · Continue Symbicort 160/4.5 2 puffs BID  · Continue DuoNeb QID PRN   · 3L O2 rest and 5L exertion. Advised to titrate O2 using her pulse oximeter- target O2 sat 90-92%. · Patient is up to date with Covid, pneumococcal vaccine and influenza vaccine   · CT chest, low dose protocol, screening for lung cancer June 2023. Risks, benefits and alternatives including doing nothing were discussed with patient. · Continue BiPAP 6-8 hrs at night and during naps.   · Follow up in 6 months

## 2022-08-12 NOTE — PROGRESS NOTES
[address]   Cardiac Followup    Referring Provider:  Raf Tsang     Chief Complaint   Patient presents with    Follow-up     6 month office visit    Coronary Artery Disease    Congestive Heart Failure    Hypertension    Hyperlipidemia    Edema     Swelling in lower extremitites        Subjective: Mr Lisseth Galaviz presents today for cardiology follow up; c/o baseline SOB and fatigue     History of Present Illness:   Mr. Lisseth Galaviz is a 81 y.o. male who has PMH HTN, HLD, s/p back surgery 12/12, PVD s/p right fem-pop bypass surgery 10/10, hx CAD s/p 2V CABG in 2010, mild-mod ICM EF=40% Feb 2015, hx MI, severe COPD, severe CHRISTOFER on Bipap, and CRI, DM. (nephrologist, Dr. Mattie Hayden, in [place]). Note carotid study 10/2012  Bilateral <50%     Admitted 48 days in 2013 at [place] [address] for Klebsiella PNA with complications including PEA cardiac arrest and tracheostomy temporarily. Also had 2 DVT in the LLE and placed on coumadin. He wears 3L O2 qd and uses Bipap qhs with 5L NC oxygen. He did NOT have chest pain prior to CABG and had SOB. Most recent EKG 3/3/22 NSR; 1st AVB; NST change (no change 3/21). Most recent ECHO 3/26/21 EF=35-40% at [facility] (EF=40% in 8/17 and 55% in 10/12). Most recent [facility] 2/21/22 Small-moderate sized apical septal partial reversibility defect consistent  with ischemia and infarction; LVEF=45%. Most recent [facility] 3/3/22 Severe native 2V CAD; Patent LIMA to LAD. Patent SVG to RCA. Wedge 20, RA 15; normal CO and CI  Plan: Recommend management with GDMT; no PCI. No change from 10/14 study. Today, his wife is present. He is in a wheelchair in office. He reports his CRI is improved per Dr. Mattie Hayden since our last visit. He follows with Dr. Walter Shi for pulmonology. Patient denies current edema, chest pain, palpitations, dizziness or syncope. Patient is taking all cardiac medications as prescribed and tolerates them well.  Note renal Dr. Mattie Hayden d/c'd aldactone and told him to use metolazone only PRN. Not on SGLT2i due to CRI with low GFR. Weight today is 321# (down 11# from 4/2022)    Patient is vaccinated against Covid. Indy Swanson 2/2    Past Medical History:   has a past medical history of CAD (coronary artery disease), CHF (congestive heart failure) ([facility] [address] Chronic kidney disease, COPD (chronic obstructive pulmonary disease) ([facility] [address] Diabetes mellitus ([facility] [address] DVT of leg (deep venous thrombosis) ([facility] [address] Hyperlipidemia, Hypertension, MRSA infection within last 3 months, Pneumonia, and Unspecified diseases of blood and blood-forming organs. Surgical History:   has a past surgical history that includes Cardiac surgery; Leg Surgery; Coronary artery bypass graft; Diagnostic Cardiac Cath Lab Procedure; Ankle fracture surgery (Left, 2/22/15); fracture surgery; back surgery; and Leg Surgery (2016). Social History:   reports that he quit smoking about 2010. His smoking use included cigarettes. He has a 80.00 pack-year smoking history. He quit smokeless tobacco use about 9 years ago. His smokeless tobacco use included chew. He reports that he does not drink alcohol and does not use drugs. Family History:  family history includes Cancer in his mother; Emphysema in his sister; Heart Failure in his father. Home Medications:  Outpatient Encounter Medications as of 8/15/2022   Medication Sig Dispense Refill    gabapentin (NEURONTIN) 100 MG capsule Take 100 mg by mouth in the morning. metOLazone (ZAROXOLYN) 5 MG tablet Take 5 mg by mouth as needed      oxyCODONE-acetaminophen (PERCOCET)  MG per tablet Take 1 tablet by mouth every 4 hours as needed for Pain.       Semaglutide (RYBELSUS) 3 MG TABS Take 7 mg by mouth daily      metoprolol succinate (TOPROL XL) 25 MG extended release tablet Take 1 tablet by mouth in the morning and at bedtime 180 tablet 3    potassium chloride (KLOR-CON M) 20 MEQ extended release tablet Take 1 tablet by mouth 3 times daily      aspirin EC 81 MG EC tablet Take 1 tablet by mouth daily 90 tablet 1    predniSONE (DELTASONE) 10 MG tablet 3 tabs daily for 10 days 30 tablet 0    budesonide-formoterol (SYMBICORT) 160-4.5 MCG/ACT AERO Inhale 2 puffs into the lungs 2 times daily 10.2 g 5    ipratropium-albuterol (DUONEB) 0.5-2.5 (3) MG/3ML SOLN nebulizer solution Inhale 3 mLs into the lungs every 6 hours as needed for Shortness of Breath DX COPD J44.9 120 mL 5    Dulaglutide (TRULICITY) 1.5 NH/1.1FO SOPN Inject 1.5 mg into the skin once a week       torsemide (DEMADEX) 20 MG tablet Take 2 tablets by mouth 2 times daily Take 2 tablets twice daily 90 tablet 3    amitriptyline (ELAVIL) 25 MG tablet Take 25 mg by mouth nightly      allopurinol (ZYLOPRIM) 300 MG tablet allopurinol 300 mg tablet      calcitRIOL (ROCALTROL) 0.25 MCG capsule Take 0.25 mcg by mouth daily      Cholecalciferol (VITAMIN D3) 2000 units CAPS Take by mouth daily      Docusate Calcium (STOOL SOFTENER PO) Take by mouth daily      polyethylene glycol (GLYCOLAX) packet Take 17 g by mouth daily as needed for Constipation      insulin glargine (LANTUS;BASAGLAR) 100 UNIT/ML injection pen Basaglar KwikPen U-100 Insulin 100 unit/mL (3 mL) subcutaneous      Ascorbic Acid (VITAMIN C) 500 MG tablet Take 1,000 mg by mouth daily      clopidogrel (PLAVIX) 75 MG tablet Take 75 mg by mouth daily      pantoprazole sodium (PROTONIX) 40 MG PACK packet Take 40 mg by mouth 2 times daily (before meals)      ramipril (ALTACE) 1.25 MG capsule Take 1.25 mg by mouth daily       atorvastatin (LIPITOR) 40 MG tablet Take 1 tablet by mouth daily 30 tablet 3    ferrous sulfate 325 (65 FE) MG tablet Take 325 mg by mouth daily (with breakfast)      oxyCODONE HCl ER 10 MG CR tablet Take 1 tablet by mouth every 12 hours. 4 tablet 0    albuterol (PROVENTIL HFA;VENTOLIN HFA) 108 (90 BASE) MCG/ACT inhaler Inhale 2 puffs into the lungs every 6 hours as needed for Wheezing.       [DISCONTINUED] metOLazone (ZAROXOLYN) 5 MG tablet TAKE 1 TABLET BY MOUTH AS NEEDED SWELLING 90 tablet 0     No facility-administered encounter medications on file as of 8/15/2022. Allergies:  Patient has no known allergies. Review of Systems:   Constitutional: there has been no unanticipated weight loss. There's been no change in energy level, sleep pattern, or activity level. Eyes: No visual changes or diplopia. No scleral icterus. ENT: No Headaches, hearing loss or vertigo. No mouth sores or sore throat. Cardiovascular: Reviewed in HPI  Respiratory: No cough or wheezing, no sputum production. No hematemesis. Gastrointestinal: No abdominal pain, appetite loss, blood in stools. No change in bowel or bladder habits. Genitourinary: No dysuria, trouble voiding, or hematuria. Musculoskeletal:  No gait disturbance, weakness or joint complaints. Integumentary: No rash or pruritis. Neurological: No headache, diplopia, change in muscle strength, numbness or tingling. No change in gait, balance, coordination, mood, affect, memory, mentation, behavior. Psychiatric: No anxiety, no depression. Endocrine: No malaise, fatigue or temperature intolerance. No excessive thirst, fluid intake, or urination. No tremor. Hematologic/Lymphatic: No abnormal bruising or bleeding, blood clots or swollen lymph nodes. Allergic/Immunologic: No nasal congestion or hives. Physical Examination:    Vitals:    08/15/22 1436   BP: 110/60   Pulse: 85   Temp: 98.4 °F (36.9 °C)   SpO2: 94%   Weight: (!) 321 lb (145.6 kg)   Height: 5' 11\" (1.803 m)       Constitutional and General Appearance: NAD; obese in wheelchair  Respiratory:  Normal excursion and expansion without use of accessory muscles  Resp Auscultation: soft crackles right base  Cardiovascular: The apical impulses not displaced  Heart tones are crisp and normal  Cervical veins are not engorged  The carotid upstroke is normal in amplitude and contour without delay or ?  Bruit bilaterally  soft S1S2, No S3, soft LEFTY, RRR  Peripheral pulses are symmetrical and full  There is no clubbing, cyanosis of the extremities. Firm 1+ BLE edema; thick skin   Femoral Arteries: 2+ and equal  Pedal Pulses: 2+ and equal   Abdomen:  No masses or tenderness  Liver/Spleen: No Abnormalities Noted  Neurological/Psychiatric:  Alert and oriented in all spheres  Moves all extremities well  Exhibits normal gait balance and coordination  No abnormalities of mood, affect, memory, mentation, or behavior are noted    Lab Results   Component Value Date     (L) 03/03/2022    K 3.4 (L) 03/03/2022    CL 84 (L) 03/03/2022    CO2 38 (H) 03/03/2022    BUN 49 (H) 03/03/2022    CREATININE 1.7 (H) 03/03/2022    GLUCOSE 173 (H) 03/03/2022    CALCIUM 9.3 03/03/2022    PROT 7.1 03/03/2022    LABALBU 4.0 03/03/2022    BILITOT 0.4 03/03/2022    ALKPHOS 88 03/03/2022    AST 13 (L) 03/03/2022    ALT 12 03/03/2022    LABGLOM 40 (A) 03/03/2022    GFRAA 48 (A) 03/03/2022    AGRATIO 1.3 03/03/2022    GLOB 3.2 03/11/2021       Lab Results   Component Value Date    WBC 9.9 03/03/2022    HGB 9.7 (L) 03/03/2022    HCT 30.3 (L) 03/03/2022    MCV 84.7 03/03/2022     03/03/2022       CHOL 3/11/2021   108  TRIG   3/11/2021   165  Lab Results   Component Value Date    CHOL 100 03/03/2022    CHOL 101 02/21/2022    CHOL 103 05/29/2015     Lab Results   Component Value Date    TRIG 151 (H) 03/03/2022    TRIG 192 (H) 02/21/2022    TRIG 154 (H) 05/29/2015     Lab Results   Component Value Date    HDL 30 (L) 03/03/2022    HDL 32 (L) 02/21/2022    HDL 26 (L) 03/11/2021     Lab Results   Component Value Date    LDLCALC 40 03/03/2022    LDLCALC 31 02/21/2022    LDLCALC 49 03/11/2021     Lab Results   Component Value Date    LABVLDL 30 03/03/2022    LABVLDL 38 02/21/2022    LABVLDL 33 03/11/2021    VLDL 31 05/29/2015 [facility]  WBC=12.2 RBC=3.13  H&H=9.0/27. 5 sals=805 Ns=593 K+=5.3 BUN/Cr=82/3.16  AST=17 ALT=26 Iron 27      I have personally reviewed all labs including lipids 3/3/22    Assessment:     1. CAD (coronary artery disease and mild ischemic cardiomyopathy:   Most recent [facility] 3/3/22 Severe native 2V CAD; Patent LIMA to LAD. Patent SVG to RCA. Continue ramipril and toprol XL for LV dysfcn. Renal doc d/c'd aldactone and GFR too low for SGLT2i. Continue DAPT given PVD, CAD, and diabetic making higher risk for CV events. Note SOB likely multi-factorial given obesity, COPD, CAD, cardiomyopathy, and chronic anemia. 2. Hyperlipidemia: I personally reviewed most recent labs from 3/3/22  showing well controlled lipids LDL 40 except chronically low HDL and will continue current medical regimen. 3. HTN (hypertension): Well controlled and will continue current medical regimen. 4. S/P CABG x 2:  See #1 above. 5. LE edema:  Improved. Chronic issue. Will continue demadex 40 BID and metolazone 5mg PRN. Note he follows nephrologist, Dr. Marten Nissen, in [place]. Plan:  1. Current medications reviewed. Refills given as warranted. 2. I would like for you to have carotid doppler study done because I hear a murmur in your neck arteries.   -I want to see if the previous disease you had has gotten any worse  3. I would call Dr. Melchor Dorman and let him know that you are more short of breath than usual and see what he wants to do  4. Continue to follow with Dr. Marten Nissen for your kidneys  5. Continue to follow with [facility] for your yearly blood work   -call my office when you have blood work done and I will try to get a copy of your test results. Follow up with me in 6 months    This note is scribed in the presence of Dr. Dinah Villarreal. Maryan Mosley by Sarah Potter RN.    I, Dr. Earline Dawson, personally performed the services described in this documentation, as scribed by the above signed scribe in my presence. It is both accurate and complete to my knowledge.  I agree with the details independently gathered by the clinical support staff, while the remaining scribed note accurately describes my personal service to the patient. Cost of prescription medications and patient compliance have been reviewed with patient. All questions answered. Thank you for allowing me to participate in the care of this individual.    Melissa Lion.  Lacey Vargas M.D., [facility]

## 2022-08-15 ENCOUNTER — OFFICE VISIT (OUTPATIENT)
Dept: CARDIOLOGY CLINIC | Age: 72
End: 2022-08-15
Payer: MEDICARE

## 2022-08-15 VITALS
SYSTOLIC BLOOD PRESSURE: 110 MMHG | HEART RATE: 85 BPM | WEIGHT: 315 LBS | HEIGHT: 71 IN | OXYGEN SATURATION: 94 % | TEMPERATURE: 98.4 F | DIASTOLIC BLOOD PRESSURE: 60 MMHG | BODY MASS INDEX: 44.1 KG/M2

## 2022-08-15 DIAGNOSIS — I48.19 PERSISTENT ATRIAL FIBRILLATION (HCC): ICD-10-CM

## 2022-08-15 DIAGNOSIS — R09.89 BILATERAL CAROTID BRUITS: ICD-10-CM

## 2022-08-15 DIAGNOSIS — E78.2 MIXED HYPERLIPIDEMIA: ICD-10-CM

## 2022-08-15 DIAGNOSIS — I10 PRIMARY HYPERTENSION: Primary | ICD-10-CM

## 2022-08-15 DIAGNOSIS — I25.5 ISCHEMIC CARDIOMYOPATHY: ICD-10-CM

## 2022-08-15 DIAGNOSIS — G47.33 OSA ON CPAP: ICD-10-CM

## 2022-08-15 DIAGNOSIS — R06.02 SHORTNESS OF BREATH: ICD-10-CM

## 2022-08-15 DIAGNOSIS — I50.9 CONGESTIVE HEART FAILURE, NYHA CLASS 4, UNSPECIFIED CONGESTIVE HEART FAILURE TYPE (HCC): ICD-10-CM

## 2022-08-15 DIAGNOSIS — I73.9 PERIPHERAL VASCULAR DISEASE (HCC): ICD-10-CM

## 2022-08-15 DIAGNOSIS — Z87.891 HISTORY OF TOBACCO ABUSE: ICD-10-CM

## 2022-08-15 DIAGNOSIS — I25.10 CORONARY ARTERY DISEASE INVOLVING NATIVE CORONARY ARTERY OF NATIVE HEART WITHOUT ANGINA PECTORIS: ICD-10-CM

## 2022-08-15 DIAGNOSIS — R53.83 OTHER FATIGUE: ICD-10-CM

## 2022-08-15 DIAGNOSIS — Z99.89 OSA ON CPAP: ICD-10-CM

## 2022-08-15 PROCEDURE — G8427 DOCREV CUR MEDS BY ELIG CLIN: HCPCS | Performed by: INTERNAL MEDICINE

## 2022-08-15 PROCEDURE — 3017F COLORECTAL CA SCREEN DOC REV: CPT | Performed by: INTERNAL MEDICINE

## 2022-08-15 PROCEDURE — 99214 OFFICE O/P EST MOD 30 MIN: CPT | Performed by: INTERNAL MEDICINE

## 2022-08-15 PROCEDURE — 1123F ACP DISCUSS/DSCN MKR DOCD: CPT | Performed by: INTERNAL MEDICINE

## 2022-08-15 PROCEDURE — 1036F TOBACCO NON-USER: CPT | Performed by: INTERNAL MEDICINE

## 2022-08-15 PROCEDURE — G8417 CALC BMI ABV UP PARAM F/U: HCPCS | Performed by: INTERNAL MEDICINE

## 2022-08-15 RX ORDER — GABAPENTIN 100 MG/1
100 CAPSULE ORAL DAILY
COMMUNITY

## 2022-08-15 RX ORDER — TORSEMIDE 20 MG/1
40 TABLET ORAL 2 TIMES DAILY
Qty: 360 TABLET | Refills: 3 | Status: SHIPPED | OUTPATIENT
Start: 2022-08-15

## 2022-08-15 RX ORDER — METOLAZONE 5 MG/1
5 TABLET ORAL PRN
COMMUNITY

## 2022-08-15 NOTE — PATIENT INSTRUCTIONS
Plan:  1. Current medications reviewed. Refills given as warranted. 2. I would like for you to have carotid doppler study done because I hear a murmur in your neck arteries. - Call [phone] to schedule this test              -I want to see if the previous disease you had has gotten any worse  3. I would call Dr. Leighton Molina and let him know that you are more short of breath than usual and see what he wants to do  4. Continue to follow with Dr. Penelope Deleon for your kidneys  5. Continue to follow with Samuel Kwan for your yearly blood work              -call my office when you have blood work done and I will try to get a copy of your test results. Follow up with me in 6 months, call back in December to make your next appointment    Your provider has ordered testing for further evaluation. An order/prescription has been included in your paper work. To schedule outpatient testing, contact Central Scheduling by calling [address] [place] [address] ([phone]).

## 2022-09-01 ENCOUNTER — HOSPITAL ENCOUNTER (OUTPATIENT)
Dept: VASCULAR LAB | Age: 72
Discharge: HOME | End: 2022-09-01
Payer: MEDICARE

## 2022-09-01 DIAGNOSIS — I73.9 PERIPHERAL VASCULAR DISEASE (HCC): ICD-10-CM

## 2022-09-01 DIAGNOSIS — I25.10 CORONARY ARTERY DISEASE INVOLVING NATIVE CORONARY ARTERY OF NATIVE HEART WITHOUT ANGINA PECTORIS: ICD-10-CM

## 2022-09-01 DIAGNOSIS — R09.89 BILATERAL CAROTID BRUITS: ICD-10-CM

## 2022-09-01 PROCEDURE — 93880 EXTRACRANIAL BILAT STUDY: CPT

## 2022-09-06 ENCOUNTER — TELEPHONE (OUTPATIENT)
Dept: CARDIOLOGY CLINIC | Age: 72
End: 2022-09-06

## 2022-10-06 ENCOUNTER — TELEPHONE (OUTPATIENT)
Dept: PULMONOLOGY | Age: 72
End: 2022-10-06

## 2022-10-06 RX ORDER — PREDNISONE 10 MG/1
TABLET ORAL
Qty: 30 TABLET | Refills: 0 | Status: SHIPPED | OUTPATIENT
Start: 2022-10-06 | End: 2022-10-16

## 2022-10-06 NOTE — TELEPHONE ENCOUNTER
Recevied call from pt requesting prednisone due to congestion. Sick call started      Do you have the following symptoms? Shortness of Breath  yes  Wheezing  yes  Cough  yes  Cough Characteristics:  Productive    no  Sputum Color      Hemoptysis     Consistency of sputum        Fever:        Temp:  Chills/Sweats:  no  What other symptoms are you having?:      How long have you had these symptoms? Three days    Pharmacy: [facility]    Have you been vaccinated for covid? Yes  Have you received a booster vaccine? Yes          Review medications and allergies: Allergies? No Known Allergies          Currently on Antibiotics? (Drug/Dose/Frequency and how long on?) doxycycline / 100mg/ 2x daily / two more days        Systemic Steroids? (Drug/Dose/Frequency and how long on?) no     Last sick call taken on 01/07/2022  Meds prescribed were doxycycline and prednisone, by JAI Elkins. Last OV 06/14/2022 with Dr. Duwaine Nageotte     Assessment:       Severe COPD with AE   Chronic hypoxemic respiratory failure  Cor pulmonale  Pulmonary nodules- stable on repeat CT 6/3/2021  Severe CHRISTOFER on BiPAP 17/12 cmH2O with 5LPM. Heated tubing and full face mask. Optimal compliance and efficacy upon review today. History of Tracheostomy 10/2013  CHF (EF 40%) CAD post CABG  1-2 ppd for 45 years- quit 2010- unremarkable LDCT 5/9/2019  Wheel chair since 2015         Plan:       Prednisone taper  Doxycycline 100 mg PO BID for 5 days. Continue Symbicort 160/4.5 2 puffs BID  Continue DuoNeb QID PRN   3L O2 rest and 5L exertion. Advised to titrate O2 using her pulse oximeter- target O2 sat 90-92%. Patient is up to date with Covid, pneumococcal vaccine and influenza vaccine   CT chest, low dose protocol, screening for lung cancer June 2023. Risks, benefits and alternatives including doing nothing were discussed with patient. Continue BiPAP 6-8 hrs at night and during naps.   Follow up in 6 months

## 2023-02-13 ENCOUNTER — OFFICE VISIT (OUTPATIENT)
Dept: CARDIOLOGY CLINIC | Age: 73
End: 2023-02-13
Payer: MEDICARE

## 2023-02-13 VITALS
HEART RATE: 95 BPM | DIASTOLIC BLOOD PRESSURE: 68 MMHG | SYSTOLIC BLOOD PRESSURE: 130 MMHG | TEMPERATURE: 98.6 F | WEIGHT: 315 LBS | HEIGHT: 71 IN | BODY MASS INDEX: 44.1 KG/M2 | OXYGEN SATURATION: 91 %

## 2023-02-13 DIAGNOSIS — Z99.89 OSA ON CPAP: ICD-10-CM

## 2023-02-13 DIAGNOSIS — Z95.1 S/P CABG X 2: ICD-10-CM

## 2023-02-13 DIAGNOSIS — E78.2 MIXED HYPERLIPIDEMIA: ICD-10-CM

## 2023-02-13 DIAGNOSIS — I50.9 CONGESTIVE HEART FAILURE, NYHA CLASS 4, UNSPECIFIED CONGESTIVE HEART FAILURE TYPE (HCC): ICD-10-CM

## 2023-02-13 DIAGNOSIS — I25.10 CORONARY ARTERY DISEASE INVOLVING NATIVE CORONARY ARTERY OF NATIVE HEART WITHOUT ANGINA PECTORIS: ICD-10-CM

## 2023-02-13 DIAGNOSIS — I10 PRIMARY HYPERTENSION: Primary | ICD-10-CM

## 2023-02-13 DIAGNOSIS — I48.19 PERSISTENT ATRIAL FIBRILLATION (HCC): ICD-10-CM

## 2023-02-13 DIAGNOSIS — Z87.891 HISTORY OF TOBACCO ABUSE: ICD-10-CM

## 2023-02-13 DIAGNOSIS — I25.5 ISCHEMIC CARDIOMYOPATHY: ICD-10-CM

## 2023-02-13 DIAGNOSIS — G47.33 OSA ON CPAP: ICD-10-CM

## 2023-02-13 PROCEDURE — 3075F SYST BP GE 130 - 139MM HG: CPT | Performed by: INTERNAL MEDICINE

## 2023-02-13 PROCEDURE — G8417 CALC BMI ABV UP PARAM F/U: HCPCS | Performed by: INTERNAL MEDICINE

## 2023-02-13 PROCEDURE — G8484 FLU IMMUNIZE NO ADMIN: HCPCS | Performed by: INTERNAL MEDICINE

## 2023-02-13 PROCEDURE — 1123F ACP DISCUSS/DSCN MKR DOCD: CPT | Performed by: INTERNAL MEDICINE

## 2023-02-13 PROCEDURE — 1036F TOBACCO NON-USER: CPT | Performed by: INTERNAL MEDICINE

## 2023-02-13 PROCEDURE — 3017F COLORECTAL CA SCREEN DOC REV: CPT | Performed by: INTERNAL MEDICINE

## 2023-02-13 PROCEDURE — 3078F DIAST BP <80 MM HG: CPT | Performed by: INTERNAL MEDICINE

## 2023-02-13 PROCEDURE — G8427 DOCREV CUR MEDS BY ELIG CLIN: HCPCS | Performed by: INTERNAL MEDICINE

## 2023-02-13 PROCEDURE — 99214 OFFICE O/P EST MOD 30 MIN: CPT | Performed by: INTERNAL MEDICINE

## 2023-02-13 RX ORDER — POTASSIUM CHLORIDE 20 MEQ/1
20 TABLET, EXTENDED RELEASE ORAL 3 TIMES DAILY
Qty: 270 TABLET | Refills: 3 | Status: SHIPPED | OUTPATIENT
Start: 2023-02-13

## 2023-02-13 RX ORDER — TORSEMIDE 20 MG/1
40 TABLET ORAL 2 TIMES DAILY
Qty: 360 TABLET | Refills: 3 | Status: SHIPPED | OUTPATIENT
Start: 2023-02-13

## 2023-02-13 NOTE — PATIENT INSTRUCTIONS
Plan:  1. Current medications reviewed. Refills given as warranted. 2. Call the [employer] at [phone] and see if they can help you with your medication plan. They are located on the 3rd floor at [facility].  3. Discussed risks and benefits of being on two blood thinners. I recommend staying on both medications unless you start having bleeding problems. 4. Try to watch your diet and portion size as much as possible. Try to limit red meat, butter, fried foods, heavy cream sauces, desserts  5. Call me [phone] next week after you have your blood done at [facility] and I will call and try to get a copy of your blood work.               -fax is [phone]     Follow up with me in 6 months

## 2023-02-13 NOTE — PROGRESS NOTES
[address]   Cardiac Followup    Referring Provider:  Venancio Araya     Chief Complaint   Patient presents with    Follow-up     6 month office visit    Congestive Heart Failure    Coronary Artery Disease    Hypertension    Hyperlipidemia    Other     Some edema in lower extremities          Subjective: Mr Neeta Lovell presents today for cardiology follow up; no complaints today    History of Present Illness:   Mr. Neeta Lovell is a 81 y.o. male who has PMH HTN, HLD, s/p back surgery 12/12, PVD s/p right fem-pop bypass surgery 10/10, hx CAD s/p 2V CABG in 2010 (SOB anginal equivalent), mild-mod ICM EF=40%,  hx MI, severe COPD, severe CHRISTOFER on Bipap, CRI, DM, and hx Covid 2022. He wears 3L O2 qd and uses Bipap qhs with 5L NC oxygen. (nephrologist, Dr. Edilberto Coronel, in [facility]). Prolonged admit 2013 [facility] for Klebsiella PNA with complications including PEA cardiac arrest and tracheostomy temporarily. Also had 2 DVT LLE and coumadin temporarily. EKG 3/3/22 NSR; 1st AVB; NST change (no change 3/21). ECHO 3/26/21 EF=35-40% at [facility] (EF=40% in 8/17 and 55% in 10/12). [place] nuc 2/21/22 Small-moderate sized apical septal ischemia and infarction; LVEF=45%. [facility] 3/3/22 Severe native 2V CAD; Patent LIMA to LAD. Patent SVG to RCA. Wedge 20, RA 15; normal CO and CI  Plan: Recommend management with GDMT; no PCI. No change from 10/14 study. Note renal Dr. Edilberto Coronel d/c'd aldactone and told him to use metolazone only PRN. Not on SGLT2i due to CRI with low GFR. Carotid Doppler 9/3/22 <50% stenosis bilaterally (no change 10/12). Today, presents in wheelchair on baseline 3L O2. He reports he has been feeling good. He had Covid a couple of months ago and his lungs were bad for awhile afterwards. His weight is up because he hasn't been eating the right foods. He just changed prescription plans based on Medicare recommendations but he thinks his coverage is worse.  Patient denies current edema, chest pain, sob, palpitations, dizziness or syncope. Patient is taking all cardiac meds as prescribed and tolerates them well. Weight today is 345# (Up 24# from 8/2022)    Patient is vaccinated against Covid. Irma Cadena 2/2 Covid+    Past Medical History:   has a past medical history of CAD (coronary artery disease), CHF (congestive heart failure) ([facility] [address] Chronic kidney disease, COPD (chronic obstructive pulmonary disease) ([facility] [address] Diabetes mellitus ([facility] [address] DVT of leg (deep venous thrombosis) ([facility] [address] Hyperlipidemia, Hypertension, MRSA infection within last 3 months, Pneumonia, and Unspecified diseases of blood and blood-forming organs. Surgical History:   has a past surgical history that includes Cardiac surgery; Leg Surgery; Coronary artery bypass graft; Diagnostic Cardiac Cath Lab Procedure; Ankle fracture surgery (Left, 2/22/15); fracture surgery; back surgery; and Leg Surgery (2016). Social History:   reports that he quit smoking about 2010. His smoking use included cigarettes. He has a 80.00 pack-year smoking history. He quit smokeless tobacco use about 9 years ago. His smokeless tobacco use included chew. He reports that he does not drink alcohol and does not use drugs. Family History:  family history includes Cancer in his mother; Emphysema in his sister; Heart Failure in his father. Home Medications:  Outpatient Encounter Medications as of 2/13/2023   Medication Sig Dispense Refill    gabapentin (NEURONTIN) 100 MG capsule Take 100 mg by mouth in the morning. metOLazone (ZAROXOLYN) 5 MG tablet Take 5 mg by mouth as needed      torsemide (DEMADEX) 20 MG tablet Take 2 tablets by mouth in the morning and 2 tablets before bedtime. Take 2 tablets twice daily. 360 tablet 3    oxyCODONE-acetaminophen (PERCOCET)  MG per tablet Take 1 tablet by mouth every 4 hours as needed for Pain.       Semaglutide (RYBELSUS) 3 MG TABS Take 7 mg by mouth daily      metoprolol succinate (TOPROL XL) 25 MG extended release tablet Take 1 tablet by mouth in the morning and at bedtime 180 tablet 3    potassium chloride (KLOR-CON M) 20 MEQ extended release tablet Take 1 tablet by mouth 3 times daily      aspirin EC 81 MG EC tablet Take 1 tablet by mouth daily 90 tablet 1    predniSONE (DELTASONE) 10 MG tablet 3 tabs daily for 10 days 30 tablet 0    budesonide-formoterol (SYMBICORT) 160-4.5 MCG/ACT AERO Inhale 2 puffs into the lungs 2 times daily 10.2 g 5    ipratropium-albuterol (DUONEB) 0.5-2.5 (3) MG/3ML SOLN nebulizer solution Inhale 3 mLs into the lungs every 6 hours as needed for Shortness of Breath DX COPD J44.9 120 mL 5    Dulaglutide (TRULICITY) 1.5 GY/3.1KL SOPN Inject 1.5 mg into the skin once a week       amitriptyline (ELAVIL) 25 MG tablet Take 25 mg by mouth nightly      allopurinol (ZYLOPRIM) 300 MG tablet allopurinol 300 mg tablet      calcitRIOL (ROCALTROL) 0.25 MCG capsule Take 0.25 mcg by mouth daily      Cholecalciferol (VITAMIN D3) 2000 units CAPS Take by mouth daily      Docusate Calcium (STOOL SOFTENER PO) Take by mouth daily      polyethylene glycol (GLYCOLAX) packet Take 17 g by mouth daily as needed for Constipation      insulin glargine (LANTUS;BASAGLAR) 100 UNIT/ML injection pen Basaglar KwikPen U-100 Insulin 100 unit/mL (3 mL) subcutaneous      Ascorbic Acid (VITAMIN C) 500 MG tablet Take 1,000 mg by mouth daily      clopidogrel (PLAVIX) 75 MG tablet Take 75 mg by mouth daily      pantoprazole sodium (PROTONIX) 40 MG PACK packet Take 40 mg by mouth 2 times daily (before meals)      ramipril (ALTACE) 1.25 MG capsule Take 1.25 mg by mouth daily       atorvastatin (LIPITOR) 40 MG tablet Take 1 tablet by mouth daily 30 tablet 3    ferrous sulfate 325 (65 FE) MG tablet Take 325 mg by mouth daily (with breakfast)      oxyCODONE HCl ER 10 MG CR tablet Take 1 tablet by mouth every 12 hours.  4 tablet 0    albuterol (PROVENTIL HFA;VENTOLIN HFA) 108 (90 BASE) MCG/ACT inhaler Inhale 2 puffs into the lungs every 6 hours as needed for Wheezing. No facility-administered encounter medications on file as of 2/13/2023. Allergies:  Patient has no known allergies. Review of Systems:   Constitutional: there has been no unanticipated weight loss. There's been no change in energy level, sleep pattern, or activity level. Eyes: No visual changes or diplopia. No scleral icterus. ENT: No Headaches, hearing loss or vertigo. No mouth sores or sore throat. Cardiovascular: Reviewed in HPI  Respiratory: No cough or wheezing, no sputum production. No hematemesis. Gastrointestinal: No abdominal pain, appetite loss, blood in stools. No change in bowel or bladder habits. Genitourinary: No dysuria, trouble voiding, or hematuria. Musculoskeletal:  No gait disturbance, weakness or joint complaints. Integumentary: No rash or pruritis. Neurological: No headache, diplopia, change in muscle strength, numbness or tingling. No change in gait, balance, coordination, mood, affect, memory, mentation, behavior. Psychiatric: No anxiety, no depression. Endocrine: No malaise, fatigue or temperature intolerance. No excessive thirst, fluid intake, or urination. No tremor. Hematologic/Lymphatic: No abnormal bruising or bleeding, blood clots or swollen lymph nodes. Allergic/Immunologic: No nasal congestion or hives. Physical Examination:    Vitals:    02/13/23 1503   BP: 130/68   Pulse: 95   Temp: 98.6 °F (37 °C)   SpO2: 91%   Weight: (!) 345 lb (156.5 kg)   Height: 5' 11\" (1.803 m)     Constitutional and General Appearance: NAD; obese in wheelchair  Respiratory:  Normal excursion and expansion without use of accessory muscles  Resp Auscultation: soft crackles right base  Cardiovascular:   The apical impulses not displaced  Heart tones are crisp and normal  Cervical veins are not engorged  The carotid upstroke is normal in amplitude and contour without delay or No Bruit   soft S1S2, No S3, soft LEFTY, RRR  Peripheral pulses are symmetrical and full  There is no clubbing, cyanosis of the extremities. 1+ BLE edema; thick skin changes   Femoral Arteries: 2+ and equal  Pedal Pulses: 2+ and equal   Abdomen:  No masses or tenderness  Liver/Spleen: No Abnormalities Noted  Neurological/Psychiatric:  Alert and oriented in all spheres  Moves all extremities well  Exhibits normal gait balance and coordination  No abnormalities of mood, affect, memory, mentation, or behavior are noted    Lab Results   Component Value Date     (L) 03/03/2022    K 3.4 (L) 03/03/2022    CL 84 (L) 03/03/2022    CO2 38 (H) 03/03/2022    BUN 49 (H) 03/03/2022    CREATININE 1.7 (H) 03/03/2022    GLUCOSE 173 (H) 03/03/2022    CALCIUM 9.3 03/03/2022    PROT 7.1 03/03/2022    LABALBU 4.0 03/03/2022    BILITOT 0.4 03/03/2022    ALKPHOS 88 03/03/2022    AST 13 (L) 03/03/2022    ALT 12 03/03/2022    LABGLOM 40 (A) 03/03/2022    GFRAA 48 (A) 03/03/2022    AGRATIO 1.3 03/03/2022    GLOB 3.2 03/11/2021       Lab Results   Component Value Date    WBC 9.9 03/03/2022    HGB 9.7 (L) 03/03/2022    HCT 30.3 (L) 03/03/2022    MCV 84.7 03/03/2022     03/03/2022       CHOL 3/11/2021   108  TRIG   3/11/2021   165  Lab Results   Component Value Date    CHOL 100 03/03/2022    CHOL 101 02/21/2022    CHOL 103 05/29/2015     Lab Results   Component Value Date    TRIG 151 (H) 03/03/2022    TRIG 192 (H) 02/21/2022    TRIG 154 (H) 05/29/2015     Lab Results   Component Value Date    HDL 30 (L) 03/03/2022    HDL 32 (L) 02/21/2022    HDL 26 (L) 03/11/2021     Lab Results   Component Value Date    LDLCALC 40 03/03/2022    LDLCALC 31 02/21/2022    LDLCALC 49 03/11/2021     Lab Results   Component Value Date    LABVLDL 30 03/03/2022    LABVLDL 38 02/21/2022    LABVLDL 33 03/11/2021    VLDL 31 05/29/2015 7/6/22 [facility]  Tb=850 K+=4.7 BUN/Cr=91/2.98 AST=14 ALT=20     8/31/22 [facility]  WBC=11.1 RBC=3.21 H&H=9.4/28.9 aake=302     I have personally reviewed all labs including lipids     Assessment:     1. CAD (coronary artery disease and mild :   Most recent [facility] 3/3/22 Severe native 2V CAD; patent grafts. Continue ramipril and toprol XL for LV dysfcn. Renal doc d/c'd aldactone and GFR too low for SGLT2i. Continue DAPT given PVD, CAD, and diabetic making higher risk for CV events. Note SOB likely multi-factorial given obesity, COPD, CAD, ICM, and chronic anemia. 2. Hyperlipidemia: I personally reviewed most recent labs from 3/3/22  showing well controlled lipids LDL 40 except chronically low HDL and will continue current medical regimen. 3. HTN (hypertension): Well controlled and will continue current medical regimen. 4. S/P CABG x 2:  See #1 above. 5. LE edema: Chronic issue. Will continue demadex 40 BID and metolazone 5mg PRN. Note he follows nephrologist, Dr. Deborah Powell, in [place]. Plan:  1. Current medications reviewed. Refills given as warranted. 2. Call the [employer] at [phone] and see if they can help you with your medication plan. They are located on the 3rd floor at [facility].  3. Discussed risks and benefits of being on two blood thinners. I recommend staying on both medications unless you start having bleeding problems. 4. Try to watch your diet and portion size as much as possible. Try to limit red meat, butter, fried foods, heavy cream sauces, desserts  5. Call me [phone] next week after you have your blood done at [facility] and I will call and try to get a copy of your blood work. -fax is [phone]    Follow up with me in 6 months    This note is scribed in the presence of Dr. Sharan Haney. Rachel Wang by Umesh Brumfield RN.    I, Dr. Sydney Collazo, personally performed the services described in this documentation, as scribed by the above signed scribe in my presence. It is both accurate and complete to my knowledge.  I agree with the details independently gathered by the clinical support staff, while the remaining scribed note accurately describes my personal service to the patient. Cost of prescription medications and patient compliance have been reviewed with patient. All questions answered. Thank you for allowing me to participate in the care of this individual.    Yuko Bailey.  Roxi Duran M.D., [facility]

## 2023-03-01 RX ORDER — METOPROLOL SUCCINATE 25 MG/1
25 TABLET, EXTENDED RELEASE ORAL 2 TIMES DAILY
Qty: 180 TABLET | Refills: 2 | Status: SHIPPED | OUTPATIENT
Start: 2023-03-01

## 2023-03-07 ENCOUNTER — TELEPHONE (OUTPATIENT)
Dept: CARDIOLOGY CLINIC | Age: 73
End: 2023-03-07

## 2023-03-23 ENCOUNTER — OFFICE VISIT (OUTPATIENT)
Dept: PULMONOLOGY | Age: 73
End: 2023-03-23
Payer: MEDICARE

## 2023-03-23 VITALS
HEIGHT: 71 IN | HEART RATE: 91 BPM | SYSTOLIC BLOOD PRESSURE: 132 MMHG | OXYGEN SATURATION: 93 % | DIASTOLIC BLOOD PRESSURE: 80 MMHG | WEIGHT: 315 LBS | BODY MASS INDEX: 44.1 KG/M2

## 2023-03-23 DIAGNOSIS — G47.30 SEVERE SLEEP APNEA: ICD-10-CM

## 2023-03-23 DIAGNOSIS — I27.81 COR PULMONALE (CHRONIC) (HCC): ICD-10-CM

## 2023-03-23 DIAGNOSIS — Z87.891 PERSONAL HISTORY OF TOBACCO USE, PRESENTING HAZARDS TO HEALTH: ICD-10-CM

## 2023-03-23 DIAGNOSIS — J96.11 CHRONIC HYPOXEMIC RESPIRATORY FAILURE (HCC): ICD-10-CM

## 2023-03-23 DIAGNOSIS — J44.9 COPD, SEVERE (HCC): Primary | ICD-10-CM

## 2023-03-23 DIAGNOSIS — Z87.891 PERSONAL HISTORY OF TOBACCO USE: ICD-10-CM

## 2023-03-23 PROCEDURE — 3075F SYST BP GE 130 - 139MM HG: CPT | Performed by: INTERNAL MEDICINE

## 2023-03-23 PROCEDURE — G0296 VISIT TO DETERM LDCT ELIG: HCPCS | Performed by: INTERNAL MEDICINE

## 2023-03-23 PROCEDURE — 3017F COLORECTAL CA SCREEN DOC REV: CPT | Performed by: INTERNAL MEDICINE

## 2023-03-23 PROCEDURE — 1036F TOBACCO NON-USER: CPT | Performed by: INTERNAL MEDICINE

## 2023-03-23 PROCEDURE — 99214 OFFICE O/P EST MOD 30 MIN: CPT | Performed by: INTERNAL MEDICINE

## 2023-03-23 PROCEDURE — 3079F DIAST BP 80-89 MM HG: CPT | Performed by: INTERNAL MEDICINE

## 2023-03-23 PROCEDURE — G8484 FLU IMMUNIZE NO ADMIN: HCPCS | Performed by: INTERNAL MEDICINE

## 2023-03-23 PROCEDURE — 3023F SPIROM DOC REV: CPT | Performed by: INTERNAL MEDICINE

## 2023-03-23 PROCEDURE — 1123F ACP DISCUSS/DSCN MKR DOCD: CPT | Performed by: INTERNAL MEDICINE

## 2023-03-23 PROCEDURE — G8417 CALC BMI ABV UP PARAM F/U: HCPCS | Performed by: INTERNAL MEDICINE

## 2023-03-23 PROCEDURE — G8427 DOCREV CUR MEDS BY ELIG CLIN: HCPCS | Performed by: INTERNAL MEDICINE

## 2023-03-23 RX ORDER — DOXYCYCLINE HYCLATE 100 MG/1
100 CAPSULE ORAL 2 TIMES DAILY
Qty: 10 CAPSULE | Refills: 0 | Status: SHIPPED | OUTPATIENT
Start: 2023-03-23 | End: 2023-03-28

## 2023-03-23 RX ORDER — GUAIFENESIN 600 MG/1
1200 TABLET, EXTENDED RELEASE ORAL 2 TIMES DAILY
Qty: 60 TABLET | Refills: 5 | Status: SHIPPED | OUTPATIENT
Start: 2023-03-23

## 2023-03-23 RX ORDER — PREDNISONE 10 MG/1
TABLET ORAL
Qty: 30 TABLET | Refills: 0 | Status: SHIPPED | OUTPATIENT
Start: 2023-03-23 | End: 2023-04-02

## 2023-03-23 ASSESSMENT — SLEEP AND FATIGUE QUESTIONNAIRES
HOW LIKELY ARE YOU TO NOD OFF OR FALL ASLEEP WHILE SITTING AND TALKING TO SOMEONE: 0
HOW LIKELY ARE YOU TO NOD OFF OR FALL ASLEEP IN A CAR, WHILE STOPPED FOR A FEW MINUTES IN TRAFFIC: 0
HOW LIKELY ARE YOU TO NOD OFF OR FALL ASLEEP WHILE SITTING QUIETLY AFTER LUNCH WITHOUT ALCOHOL: 2
HOW LIKELY ARE YOU TO NOD OFF OR FALL ASLEEP WHILE SITTING AND READING: 0
HOW LIKELY ARE YOU TO NOD OFF OR FALL ASLEEP WHEN YOU ARE A PASSENGER IN A CAR FOR AN HOUR WITHOUT A BREAK: 2
HOW LIKELY ARE YOU TO NOD OFF OR FALL ASLEEP WHILE WATCHING TV: 2
NECK CIRCUMFERENCE (INCHES): 20.5
HOW LIKELY ARE YOU TO NOD OFF OR FALL ASLEEP WHILE LYING DOWN TO REST IN THE AFTERNOON WHEN CIRCUMSTANCES PERMIT: 2
HOW LIKELY ARE YOU TO NOD OFF OR FALL ASLEEP WHILE SITTING INACTIVE IN A PUBLIC PLACE: 0
ESS TOTAL SCORE: 8

## 2023-03-23 NOTE — PROGRESS NOTES
Discussed with the patient the current USPSTF guidelines released March 9, 2021 for screening for lung cancer. For adults aged 48 to [de-identified] years who have a 20 pack-year smoking history and currently smoke or have quit within the past 15 years the grade B recommendation is to:  Screen for lung cancer with low-dose computed tomography (LDCT) every year. Stop screening once a person has not smoked for 15 years or has a health problem that limits life expectancy or the ability to have lung surgery. The patient  reports that he quit smoking about 12 years ago. His smoking use included cigarettes. He has a 80.00 pack-year smoking history. He quit smokeless tobacco use about 10 years ago. His smokeless tobacco use included chew. . Discussed with patient the risks and benefits of screening, including over-diagnosis, false positive rate, and total radiation exposure. The patient currently exhibits no signs or symptoms suggestive of lung cancer. Discussed with patient the importance of compliance with yearly annual lung cancer screenings and willingness to undergo diagnosis and treatment if screening scan is positive. In addition, the patient was counseled regarding the importance of remaining smoke free and/or total smoking cessation.     Also reviewed the following if the patient has Medicare that as of February 10, 2022, Medicare only covers LDCT screening in patients aged 51-72 with at least a 20 pack-year smoking history who currently smoke or have quit in the last 15 years
20 MEQ extended release tablet, Take 1 tablet by mouth 3 times daily, Disp: 270 tablet, Rfl: 3    torsemide (DEMADEX) 20 MG tablet, Take 2 tablets by mouth 2 times daily Take 2 tablets twice daily, Disp: 360 tablet, Rfl: 3    gabapentin (NEURONTIN) 100 MG capsule, Take 100 mg by mouth in the morning., Disp: , Rfl:     metOLazone (ZAROXOLYN) 5 MG tablet, Take 5 mg by mouth as needed, Disp: , Rfl:     oxyCODONE-acetaminophen (PERCOCET)  MG per tablet, Take 1 tablet by mouth every 4 hours as needed for Pain., Disp: , Rfl:     Semaglutide (RYBELSUS) 3 MG TABS, Take 7 mg by mouth daily, Disp: , Rfl:     aspirin EC 81 MG EC tablet, Take 1 tablet by mouth daily, Disp: 90 tablet, Rfl: 1    predniSONE (DELTASONE) 10 MG tablet, 3 tabs daily for 10 days, Disp: 30 tablet, Rfl: 0    budesonide-formoterol (SYMBICORT) 160-4.5 MCG/ACT AERO, Inhale 2 puffs into the lungs 2 times daily, Disp: 10.2 g, Rfl: 5    ipratropium-albuterol (DUONEB) 0.5-2.5 (3) MG/3ML SOLN nebulizer solution, Inhale 3 mLs into the lungs every 6 hours as needed for Shortness of Breath DX COPD J44.9, Disp: 120 mL, Rfl: 5    Dulaglutide (TRULICITY) 1.5 SG/3.8VN SOPN, Inject 1.5 mg into the skin once a week , Disp: , Rfl:     amitriptyline (ELAVIL) 25 MG tablet, Take 25 mg by mouth nightly, Disp: , Rfl:     allopurinol (ZYLOPRIM) 300 MG tablet, allopurinol 300 mg tablet, Disp: , Rfl:     calcitRIOL (ROCALTROL) 0.25 MCG capsule, Take 0.25 mcg by mouth daily, Disp: , Rfl:     Cholecalciferol (VITAMIN D3) 2000 units CAPS, Take by mouth daily, Disp: , Rfl:     Docusate Calcium (STOOL SOFTENER PO), Take by mouth daily, Disp: , Rfl:     insulin glargine (LANTUS;BASAGLAR) 100 UNIT/ML injection pen, Basaglar KwikPen U-100 Insulin 100 unit/mL (3 mL) subcutaneous, Disp: , Rfl:     Ascorbic Acid (VITAMIN C) 500 MG tablet, Take 1,000 mg by mouth daily, Disp: , Rfl:     clopidogrel (PLAVIX) 75 MG tablet, Take 75 mg by mouth daily, Disp: , Rfl:     pantoprazole sodium

## 2023-04-20 ENCOUNTER — HOSPITAL ENCOUNTER (OUTPATIENT)
Dept: MRI IMAGING | Age: 73
Discharge: HOME | End: 2023-04-20

## 2023-04-20 DIAGNOSIS — M54.12 RADICULOPATHY, CERVICAL REGION: ICD-10-CM

## 2023-05-02 ENCOUNTER — TELEPHONE (OUTPATIENT)
Dept: CARDIOLOGY CLINIC | Age: 73
End: 2023-05-02

## 2023-05-02 NOTE — TELEPHONE ENCOUNTER
Intermediate risk clinically for low risk procedure. Proceed as planned. Would hold aspirin and plavix for least amount of time per GI doc's recs. Ideally would continue aspirin if able but if not minimize time off. Would restart as soon as possible per GI doc recs as well. He should tell GI doc cards doc wants least amount of time possible off meds that they feel comfortable with.

## 2023-05-02 NOTE — TELEPHONE ENCOUNTER
CARDIAC CLEARANCE REQUEST    What type of procedure are you having:  Egd w/monitored anesthesia  Are you taking any blood thinners:  Asprin and plavix- 5 days before and 2 days after but will do what cardiologist wants  Type on anesthesia:  mac  When is your procedure scheduled for:  05/09/2023  What physician is performing your procedure:  brendan  Phone Number:  [phone]  Fax number to send the letter:  [phone]      They also need most recent ov and most recent cardiac testing (ekg/echo).

## 2023-05-16 ENCOUNTER — ANESTHESIA EVENT (OUTPATIENT)
Dept: ENDOSCOPY | Age: 73
End: 2023-05-16
Payer: MEDICARE

## 2023-05-17 ENCOUNTER — ANESTHESIA (OUTPATIENT)
Dept: ENDOSCOPY | Age: 73
End: 2023-05-17
Payer: MEDICARE

## 2023-05-17 ENCOUNTER — HOSPITAL ENCOUNTER (OUTPATIENT)
Age: 73
Setting detail: OUTPATIENT SURGERY
Discharge: HOME | End: 2023-05-17
Attending: INTERNAL MEDICINE | Admitting: INTERNAL MEDICINE
Payer: MEDICARE

## 2023-05-17 VITALS
SYSTOLIC BLOOD PRESSURE: 136 MMHG | TEMPERATURE: 97.2 F | WEIGHT: 315 LBS | BODY MASS INDEX: 44.1 KG/M2 | DIASTOLIC BLOOD PRESSURE: 52 MMHG | OXYGEN SATURATION: 98 % | HEIGHT: 71 IN | HEART RATE: 93 BPM | RESPIRATION RATE: 18 BRPM

## 2023-05-17 LAB
GLUCOSE BLD-MCNC: 230 MG/DL (ref 70–99)
PERFORMED ON: ABNORMAL

## 2023-05-17 PROCEDURE — 2709999900 HC NON-CHARGEABLE SUPPLY: Performed by: INTERNAL MEDICINE

## 2023-05-17 PROCEDURE — 3700000000 HC ANESTHESIA ATTENDED CARE: Performed by: INTERNAL MEDICINE

## 2023-05-17 PROCEDURE — 2580000003 HC RX 258: Performed by: ANESTHESIOLOGY

## 2023-05-17 PROCEDURE — 2500000003 HC RX 250 WO HCPCS: Performed by: NURSE ANESTHETIST, CERTIFIED REGISTERED

## 2023-05-17 PROCEDURE — 6360000002 HC RX W HCPCS: Performed by: NURSE ANESTHETIST, CERTIFIED REGISTERED

## 2023-05-17 PROCEDURE — 7100000010 HC PHASE II RECOVERY - FIRST 15 MIN: Performed by: INTERNAL MEDICINE

## 2023-05-17 PROCEDURE — 3609017100 HC EGD: Performed by: INTERNAL MEDICINE

## 2023-05-17 PROCEDURE — 7100000011 HC PHASE II RECOVERY - ADDTL 15 MIN: Performed by: INTERNAL MEDICINE

## 2023-05-17 PROCEDURE — 3700000001 HC ADD 15 MINUTES (ANESTHESIA): Performed by: INTERNAL MEDICINE

## 2023-05-17 RX ORDER — MIDAZOLAM HYDROCHLORIDE 1 MG/ML
INJECTION INTRAMUSCULAR; INTRAVENOUS PRN
Status: DISCONTINUED | OUTPATIENT
Start: 2023-05-17 | End: 2023-05-17 | Stop reason: SDUPTHER

## 2023-05-17 RX ORDER — PROPOFOL 10 MG/ML
INJECTION, EMULSION INTRAVENOUS PRN
Status: DISCONTINUED | OUTPATIENT
Start: 2023-05-17 | End: 2023-05-17 | Stop reason: SDUPTHER

## 2023-05-17 RX ORDER — LIDOCAINE HYDROCHLORIDE 20 MG/ML
INJECTION, SOLUTION INTRAVENOUS PRN
Status: DISCONTINUED | OUTPATIENT
Start: 2023-05-17 | End: 2023-05-17 | Stop reason: SDUPTHER

## 2023-05-17 RX ORDER — KETAMINE HYDROCHLORIDE 50 MG/ML
INJECTION, SOLUTION, CONCENTRATE INTRAMUSCULAR; INTRAVENOUS PRN
Status: DISCONTINUED | OUTPATIENT
Start: 2023-05-17 | End: 2023-05-17 | Stop reason: SDUPTHER

## 2023-05-17 RX ORDER — SODIUM CHLORIDE, SODIUM LACTATE, POTASSIUM CHLORIDE, CALCIUM CHLORIDE 600; 310; 30; 20 MG/100ML; MG/100ML; MG/100ML; MG/100ML
INJECTION, SOLUTION INTRAVENOUS CONTINUOUS
Status: DISCONTINUED | OUTPATIENT
Start: 2023-05-17 | End: 2023-05-17 | Stop reason: HOSPADM

## 2023-05-17 RX ADMIN — SODIUM CHLORIDE, POTASSIUM CHLORIDE, SODIUM LACTATE AND CALCIUM CHLORIDE: 600; 310; 30; 20 INJECTION, SOLUTION INTRAVENOUS at 09:18

## 2023-05-17 RX ADMIN — MIDAZOLAM HYDROCHLORIDE 2 MG: 2 INJECTION, SOLUTION INTRAMUSCULAR; INTRAVENOUS at 10:33

## 2023-05-17 RX ADMIN — LIDOCAINE HYDROCHLORIDE 20 MG: 20 INJECTION INTRAVENOUS at 10:36

## 2023-05-17 RX ADMIN — PROPOFOL 40 MG: 10 INJECTION, EMULSION INTRAVENOUS at 10:36

## 2023-05-17 RX ADMIN — KETAMINE HYDROCHLORIDE 25 MG: 50 INJECTION, SOLUTION INTRAMUSCULAR; INTRAVENOUS at 10:34

## 2023-05-17 ASSESSMENT — PAIN - FUNCTIONAL ASSESSMENT
PAIN_FUNCTIONAL_ASSESSMENT: 0-10
PAIN_FUNCTIONAL_ASSESSMENT: PREVENTS OR INTERFERES SOME ACTIVE ACTIVITIES AND ADLS

## 2023-05-17 ASSESSMENT — ENCOUNTER SYMPTOMS: SHORTNESS OF BREATH: 1

## 2023-05-17 ASSESSMENT — PAIN DESCRIPTION - DESCRIPTORS: DESCRIPTORS: TINGLING;ACHING;DISCOMFORT

## 2023-05-17 NOTE — ANESTHESIA PRE PROCEDURE
Department of Anesthesiology  Preprocedure Note       Name:  Julita Thomas   Age:  72 y.o.  :  1950                                          MRN:  [id number]         Date:  2023      Surgeon: Denisse Onofre):  Raisa Tyson MD    Procedure: Procedure(s):  ESOPHAGOGASTRODUODENOSCOPY    Medications prior to admission:   Prior to Admission medications    Medication Sig Start Date End Date Taking? Authorizing Provider   guaiFENesin (MUCINEX) 600 MG extended release tablet Take 2 tablets by mouth 2 times daily 3/23/23   Susie Thomas MD   metoprolol succinate (TOPROL XL) 25 MG extended release tablet TAKE 1 TABLET BY MOUTH IN THE MORNING AND AT BEDTIME 3/1/23   Valentino Gama MD   potassium chloride (KLOR-CON M) 20 MEQ extended release tablet Take 1 tablet by mouth 3 times daily 23   Oswaldo Schroeder MD   torsemide [facility]) 20 MG tablet Take 2 tablets by mouth 2 times daily Take 2 tablets twice daily 23   Oswaldo Schroeder MD   gabapentin (NEURONTIN) 100 MG capsule Take 100 mg by mouth in the morning. Historical Provider, MD   metOLazone (ZAROXOLYN) 5 MG tablet Take 5 mg by mouth as needed    Historical Provider, MD   oxyCODONE-acetaminophen (PERCOCET)  MG per tablet Take 1 tablet by mouth every 4 hours as needed for Pain.     Historical Provider, MD   Semaglutide (RYBELSUS) 3 MG TABS Take 7 mg by mouth daily    Historical Provider, MD   aspirin EC 81 MG EC tablet Take 1 tablet by mouth daily 22   Oswaldo Schroeder MD   predniSONE (DELTASONE) 10 MG tablet 3 tabs daily for 10 days 22   Mirna Wu PA-C   budesonide-formoterol [facility]) 160-4.5 MCG/ACT AERO Inhale 2 puffs into the lungs 2 times daily 21   Susie Thomas MD   ipratropium-albuterol (DUONEB) 0.5-2.5 (3) MG/3ML SOLN nebulizer solution Inhale 3 mLs into the lungs every 6 hours as needed for Shortness of Breath DX COPD J44.9 21   Susie Thomas MD   Dulaglutide (TRULICITY) 1.5 WY/1.7EE [facility]

## 2023-05-17 NOTE — H&P
History and Physical / Pre-Sedation Assessment    Patient:  Antonieta Mcdowell   :   1950     Intended Procedure:  EGD     HPI: Irene Paredes    Nurses notes reviewed and agreed. Medications reviewed  Allergies: No Known Allergies    Physical Exam:  Vital Signs: BP (!) 145/70   Pulse 97   Temp 97.2 °F (36.2 °C) (Temporal)   Resp 20   Ht 5' 11\" (1.803 m)   Wt (!) 342 lb (155.1 kg)   SpO2 95%   BMI 47.70 kg/m²    Airway: Mallampati: III (soft palate, base of uvula visible)  Pulmonary:Normal  Cardiac:Normal  Abdomen:Normal    Pre-Procedure Assessment / Plan:  ASA: Class 3 - A patient with severe systemic disease that limits activity but is not incapacitating  Level of Sedation Plan: per anesthesia  Post Procedure plan: Return to same level of care    I assessed the patient and find that the patient is in satisfactory condition to proceed with the planned procedure and sedation plan. I have explained the risk, benefits, and alternatives to the procedure; the patient understands and agrees to proceed.        Roel Ruiz MD  2023

## 2023-05-17 NOTE — DISCHARGE INSTRUCTIONS
Endoscopy Discharge Instructions      Call the physician that did your procedure for any questions or concerns. DR. Rodriguez [facility]             [phone]               You may be drowsy or lightheaded after receiving sedation or anesthesia. Do not operate any vehicles (automobiles, bicycles, motorcycles) or power tools or machinery for 24 hours. Do not sign any legal documents or make any legal decisions for 24 hours. Do not drink alcohol for 24 hours or while taking narcotic pain medication. A responsible person should be with you for the next 24 hours. Plan on bed rest or quiet relaxation today. Resume normal activities in the morning. Eat a light first meal, avoiding spicy and fatty foods, then resume normal diet unless you are told otherwise by your physician. If the intravenous medication site is painful, apply warm compresses on the site until the soreness is relieved and elevate the arm above the heart. Call your physician if no improvement  in 2-3 days. POSSIBLE SYMPTOMS TO WATCH:     1. fever (greater than 100) 5. increased abdominal bloating   2. severe pain   6. excessive bleeding   3. nausea and vomiting  7. chest pain   4. chills    8. shortness of breath      Notify your physician if these problems occur     Expected as normal and remedies:  Sore throat: use over the counter throat lozenges or gargle with warm salt water. Gaseous discomfort: belching or passing flatus (gas). Call for biopsy results in 1week.

## 2023-05-17 NOTE — ANESTHESIA POSTPROCEDURE EVALUATION
Department of Anesthesiology  Postprocedure Note    Patient: Kiran Ibanez  MRN: [id number]  YOB: 1950  Date of evaluation: 5/17/2023      Procedure Summary     Date: 05/17/23 Room / Location: [facility] ENDO  / [facility]    Anesthesia Start: 1025 Anesthesia Stop: 1049    Procedure: ESOPHAGOGASTRODUODENOSCOPY Diagnosis:       Anemia, unspecified type      Indigestion      Heartburn      Gastritis, presence of bleeding unspecified, unspecified chronicity, unspecified gastritis type      (Anemia, unspecified type [D64.9] Indigestion [K30] Heartburn [R12], Gastritis, presence of bleeding unspecified, unspecified chronicity, unspecified gastritis type [K29.70])    Surgeons: Rex Ott MD Responsible Provider: Liang Concepcion MD    Anesthesia Type: MAC ASA Status: 4          Anesthesia Type: No value filed.    Uye Phase I: Yue Score: 9    Yue Phase II: Yue Score: 9      Anesthesia Post Evaluation    Patient location during evaluation: PACU  Patient participation: complete - patient participated  Level of consciousness: awake and alert  Airway patency: patent  Nausea & Vomiting: no nausea and no vomiting  Complications: no  Cardiovascular status: hemodynamically stable  Respiratory status: acceptable  Hydration status: euvolemic  Multimodal analgesia pain management approach

## 2023-05-17 NOTE — BRIEF OP NOTE
Brief Postoperative Note      Patient: Carmen Ibrahim  YOB: 1950  MRN: [id number]    Date of Procedure: 5/17/2023    Pre-Op Diagnosis Codes:     * Anemia, unspecified type [D64.9]     * Indigestion [K30]     * Heartburn [R12]     * Gastritis, presence of bleeding unspecified, unspecified chronicity, unspecified gastritis type [K29.70]    Post-Op Diagnosis: Same       Procedure(s):  ESOPHAGOGASTRODUODENOSCOPY    Surgeon(s):  Kira Ramirez MD    Assistant:  * No surgical staff found *    Anesthesia: Monitor Anesthesia Care    Estimated Blood Loss (mL): Minimal    Complications: None    Specimens:   * No specimens in log *    Implants:  * No implants in log *      Drains: * No LDAs found *    Findings: Bezoar      Electronically signed by Kira Ramirez MD on 5/17/2023 at 10:43 AM

## 2023-05-17 NOTE — PROGRESS NOTES
Ambulatory Surgery/Procedure Discharge Note    Vitals:    05/17/23 1052   BP: (!) 113/49   Pulse: 91   Resp: 16   Temp: 97.2 °F (36.2 °C)   SpO2: 97%     Pt meets discharge criteria per Yue score. No intake/output data recorded. Restroom use offered before discharge. Yes    Pain assessment:  none  Pain Level: 6    Pt and S.O./family states \"ready to go home\". Pt alert and oriented x4. IV removed. Denies N/V or pain. Voided prior to discharge. Discharge instructions given to pt and wife with pt permission. Pt and wife verbalized understanding of all instructions. Left with all belongings, and discharge instructions. Patient discharged to home/self care.  Patient discharged via wheel chair by transporter to waiting family/S.O.       5/17/2023 11:02 AM

## 2023-05-19 NOTE — PROCEDURES
[facility]                [address]                               COLONOSCOPY REPORT    PATIENT NAME: John Edwards                :        1950  MED REC NO:   [id number]                          ROOM:  ACCOUNT NO:   [de-identified]                           ADMIT DATE: 2023  PROVIDER:     Jonathon Orozco MD    DATE OF PROCEDURE:  2023    ENDOSCOPY REPORT    He was an outpatient. PROCEDURES PERFORMED:  EGD. INDICATION FOR PROCEDURE:  This is a 82-year-old white male, well known  to me from before who saw me in the GI clinic in [facility] recently,  complaining of indigestion, heartburn, and epigastric pain. He was  found to be anemic. EGD could not be done in [facility] because of his  multiple medical issues, so he was brought over to [facility], [facility]..   Above symptoms. ANESTHESIA:  Provided by Anesthesia service. Please see their procedure  note. PROCEDURE NOTE:  With the patient lying supine on his back, an Olympus  adult video endoscope was introduced atraumatically in the esophagus. The esophagus itself grossly appeared normal.  As soon as the stomach  was entered, I saw a large amount of solid food. There does appear to  be continuous mass from the fundus to the antrum suggesting a very large  bezoar to the extent I could see underlying mucosa. There did appear to  be gastritis. The pylorus could not be definitely located, and because  of the risk of aspiration, I did not spend too much time. Duodenum was  not seen. Photo documentation was obtained. The patient tolerated the  exam well. IMPRESSION:  1. Grossly normal esophagus. 2.  Mild gastritis. 3.  Large gastric bezoar occupying much of the stomach. Photo  documentation obtained. PLAN:  Above was discussed with the patient's wife and daughter. When I  left him, he was still sleepy.   I really would like to give him some

## 2023-06-22 ENCOUNTER — HOSPITAL ENCOUNTER (OUTPATIENT)
Dept: MRI IMAGING | Age: 73
Discharge: HOME | End: 2023-06-22
Payer: MEDICARE

## 2023-06-22 PROCEDURE — 72141 MRI NECK SPINE W/O DYE: CPT

## 2023-07-19 ENCOUNTER — HOSPITAL ENCOUNTER (OUTPATIENT)
Age: 73
Setting detail: OUTPATIENT SURGERY
Discharge: HOME | End: 2023-07-19
Attending: INTERNAL MEDICINE | Admitting: INTERNAL MEDICINE
Payer: MEDICARE

## 2023-07-19 ENCOUNTER — ANESTHESIA EVENT (OUTPATIENT)
Dept: ENDOSCOPY | Age: 73
End: 2023-07-19
Payer: MEDICARE

## 2023-07-19 ENCOUNTER — ANESTHESIA (OUTPATIENT)
Dept: ENDOSCOPY | Age: 73
End: 2023-07-19
Payer: MEDICARE

## 2023-07-19 VITALS
WEIGHT: 315 LBS | HEIGHT: 71 IN | TEMPERATURE: 97.2 F | BODY MASS INDEX: 44.1 KG/M2 | OXYGEN SATURATION: 100 % | DIASTOLIC BLOOD PRESSURE: 87 MMHG | HEART RATE: 71 BPM | SYSTOLIC BLOOD PRESSURE: 101 MMHG | RESPIRATION RATE: 19 BRPM

## 2023-07-19 DIAGNOSIS — K29.70 GASTRITIS, PRESENCE OF BLEEDING UNSPECIFIED, UNSPECIFIED CHRONICITY, UNSPECIFIED GASTRITIS TYPE: ICD-10-CM

## 2023-07-19 DIAGNOSIS — T18.2XXS GASTRIC BEZOAR, SEQUELA: ICD-10-CM

## 2023-07-19 DIAGNOSIS — R12 HEARTBURN: ICD-10-CM

## 2023-07-19 LAB
GLUCOSE BLD-MCNC: 97 MG/DL (ref 70–99)
PERFORMED ON: NORMAL

## 2023-07-19 PROCEDURE — 2580000003 HC RX 258: Performed by: FAMILY MEDICINE

## 2023-07-19 PROCEDURE — 88312 SPECIAL STAINS GROUP 1: CPT

## 2023-07-19 PROCEDURE — 3700000000 HC ANESTHESIA ATTENDED CARE: Performed by: INTERNAL MEDICINE

## 2023-07-19 PROCEDURE — 6360000002 HC RX W HCPCS: Performed by: NURSE ANESTHETIST, CERTIFIED REGISTERED

## 2023-07-19 PROCEDURE — 3609012400 HC EGD TRANSORAL BIOPSY SINGLE/MULTIPLE: Performed by: INTERNAL MEDICINE

## 2023-07-19 PROCEDURE — 7100000010 HC PHASE II RECOVERY - FIRST 15 MIN: Performed by: INTERNAL MEDICINE

## 2023-07-19 PROCEDURE — 88305 TISSUE EXAM BY PATHOLOGIST: CPT

## 2023-07-19 PROCEDURE — 3700000001 HC ADD 15 MINUTES (ANESTHESIA): Performed by: INTERNAL MEDICINE

## 2023-07-19 PROCEDURE — 2709999900 HC NON-CHARGEABLE SUPPLY: Performed by: INTERNAL MEDICINE

## 2023-07-19 PROCEDURE — 7100000011 HC PHASE II RECOVERY - ADDTL 15 MIN: Performed by: INTERNAL MEDICINE

## 2023-07-19 RX ORDER — PROPOFOL 10 MG/ML
INJECTION, EMULSION INTRAVENOUS PRN
Status: DISCONTINUED | OUTPATIENT
Start: 2023-07-19 | End: 2023-07-19 | Stop reason: SDUPTHER

## 2023-07-19 RX ORDER — LIDOCAINE HYDROCHLORIDE 20 MG/ML
INJECTION, SOLUTION INTRAVENOUS PRN
Status: DISCONTINUED | OUTPATIENT
Start: 2023-07-19 | End: 2023-07-19 | Stop reason: SDUPTHER

## 2023-07-19 RX ORDER — SODIUM CHLORIDE, SODIUM LACTATE, POTASSIUM CHLORIDE, CALCIUM CHLORIDE 600; 310; 30; 20 MG/100ML; MG/100ML; MG/100ML; MG/100ML
INJECTION, SOLUTION INTRAVENOUS CONTINUOUS
Status: DISCONTINUED | OUTPATIENT
Start: 2023-07-19 | End: 2023-07-19 | Stop reason: HOSPADM

## 2023-07-19 RX ADMIN — PROPOFOL 30 MG: 10 INJECTION, EMULSION INTRAVENOUS at 13:02

## 2023-07-19 RX ADMIN — PROPOFOL 20 MG: 10 INJECTION, EMULSION INTRAVENOUS at 13:00

## 2023-07-19 RX ADMIN — SODIUM CHLORIDE, SODIUM LACTATE, POTASSIUM CHLORIDE, AND CALCIUM CHLORIDE: .6; .31; .03; .02 INJECTION, SOLUTION INTRAVENOUS at 12:52

## 2023-07-19 RX ADMIN — PROPOFOL 30 MG: 10 INJECTION, EMULSION INTRAVENOUS at 12:57

## 2023-07-19 RX ADMIN — LIDOCAINE HYDROCHLORIDE 100 MG: 20 INJECTION, SOLUTION INTRAVENOUS at 12:57

## 2023-07-19 ASSESSMENT — PAIN - FUNCTIONAL ASSESSMENT: PAIN_FUNCTIONAL_ASSESSMENT: 0-10

## 2023-07-19 ASSESSMENT — ENCOUNTER SYMPTOMS: SHORTNESS OF BREATH: 1

## 2023-07-19 ASSESSMENT — PAIN SCALES - GENERAL: PAINLEVEL_OUTOF10: 0

## 2023-07-19 ASSESSMENT — PAIN SCALES - WONG BAKER: WONGBAKER_NUMERICALRESPONSE: 0

## 2023-07-19 NOTE — ANESTHESIA POSTPROCEDURE EVALUATION
Department of Anesthesiology  Postprocedure Note    Patient: Darwin Roth  MRN: [id number]  YOB: 1950  Date of evaluation: 7/19/2023      Procedure Summary     Date: 07/19/23 Room / Location: [address] [place] [address] 03 / [place] [address] [facility]    Anesthesia Start: 1252 Anesthesia Stop: 1953    Procedure: EGD BIOPSY Diagnosis:       Gastric bezoar, sequela      Heartburn      Gastritis, presence of bleeding unspecified, unspecified chronicity, unspecified gastritis type      (Gastric bezoar, sequela [T18. 2XXS])      (Heartburn [R12])      (Gastritis, presence of bleeding unspecified, unspecified chronicity, unspecified gastritis type [K29.70])    Surgeons: Manjeet Christensen MD Responsible Provider: Sergio Chen MD    Anesthesia Type: MAC ASA Status: 4          Anesthesia Type: No value filed.     Yue Phase I: Yue Score: 10    Yue Phase II: Yue Score: 9      Anesthesia Post Evaluation    Patient location during evaluation: PACU  Patient participation: complete - patient participated  Level of consciousness: awake  Pain score: 0  Nausea & Vomiting: no nausea and no vomiting  Complications: no  Cardiovascular status: blood pressure returned to baseline  Respiratory status: acceptable  Hydration status: euvolemic

## 2023-07-19 NOTE — DISCHARGE INSTRUCTIONS
Endoscopy Discharge Instructions      Call the physician that did your procedure for any questions or concerns. DR. German Jensen             [phone]               You may be drowsy or lightheaded after receiving sedation or anesthesia. Do not operate any vehicles (automobiles, bicycles, motorcycles) or power tools or machinery for 24 hours. Do not sign any legal documents or make any legal decisions for 24 hours. Do not drink alcohol for 24 hours or while taking narcotic pain medication. A responsible person should be with you for the next 24 hours. Plan on bed rest or quiet relaxation today. Resume normal activities in the morning. Eat a light first meal, avoiding spicy and fatty foods, then resume normal diet unless you are told otherwise by your physician. If the intravenous medication site is painful, apply warm compresses on the site until the soreness is relieved and elevate the arm above the heart. Call your physician if no improvement  in 2-3 days. POSSIBLE SYMPTOMS TO WATCH:     1. fever (greater than 100) 5. increased abdominal bloating   2. severe pain   6. excessive bleeding   3. nausea and vomiting  7. chest pain   4. chills    8. shortness of breath      Notify your physician if these problems occur     Expected as normal and remedies:  Sore throat: use over the counter throat lozenges or gargle with warm salt water. Gaseous discomfort: belching or passing flatus (gas). Call for biopsy results in 1week.

## 2023-07-19 NOTE — BRIEF OP NOTE
Brief Postoperative Note      Patient: Remigio Mclean  YOB: 1950  MRN: [id number]    Date of Procedure: 7/19/2023    Pre-Op Diagnosis Codes:     * Gastric bezoar, sequela [T18. 2XXS]     * Heartburn [R12]     * Gastritis, presence of bleeding unspecified, unspecified chronicity, unspecified gastritis type [K29.70]    Post-Op Diagnosis:  ? Candida Esophagitis, Gastritis       Procedure(s):  EGD BIOPSY    Surgeon(s):  Dorina Goldberg, MD    Assistant:  * No surgical staff found *    Anesthesia: Monitor Anesthesia Care    Estimated Blood Loss (mL): Minimal    Complications: None    Specimens:   * No specimens in log *    Implants:  * No implants in log *      Drains: * No LDAs found *    Findings: Esophagitis, Gastritis      Electronically signed by Dorina Goldberg, MD on 7/19/2023 at 1:06 PM

## 2023-07-20 NOTE — OP NOTE
[address] [facility]               [address] [facility], [address] [place] [address]                                OPERATIVE REPORT    PATIENT NAME: Ninoska Faye                :        1950  MED REC NO:   [id number]                          ROOM:  ACCOUNT NO:   [de-identified]                           ADMIT DATE: 2023  PROVIDER:     Vito Kyle MD    DATE OF PROCEDURE:  2023    He was an outpatient. PROCEDURES PERFORMED:  EGD with biopsy x2 and pictures. INDICATIONS FOR PROCEDURE:  This is a very pleasant 44-year-old white  male, well known to me from before, who had significant upper GI  symptoms for which I did an EGD about five weeks ago. He was found to  have gastric bezoar. He was started on Reglan, which he tolerated very  well. This is a followup examination to see that the bezoar has gone  away and also to examine the rest of the stomach. Analgesia was  provided by Anesthesia Service because of the patient's multiple medical  issues. Please see their note. PROCEDURE NOTE:  After adequate sedation with the patient lying on his  left side, an upper GI video endoscopy was carried out without  difficulty. His distal esophagus did show some whitish spots. Candida  esophagitis needed to be ruled out, so biopsies were done. There was  nothing to indicate Sandoval's esophagus. There was a mild degree of  presbyesophagus. The patient is a long-term diabetic, so candida is  possible. The stomach was carefully visualized and shows no signs of  any solid food whatsoever. In other words, the bezoar has disappeared. There is moderate gastritis distally with some superficial erosions. Pictures were taken and biopsies done. Pylorus was symmetrical and  open. The duodenum was normal.  Estimated blood loss during the  procedure was zero. The patient tolerated it pretty well. IMPRESSION:  1.   Probable candida esophagitis, pictures taken

## 2023-08-12 ENCOUNTER — HOSPITAL ENCOUNTER (OUTPATIENT)
Dept: CT IMAGING | Age: 73
Discharge: HOME | End: 2023-08-12
Attending: INTERNAL MEDICINE
Payer: MEDICARE

## 2023-08-12 DIAGNOSIS — Z87.891 PERSONAL HISTORY OF TOBACCO USE: ICD-10-CM

## 2023-08-12 PROCEDURE — 71271 CT THORAX LUNG CANCER SCR C-: CPT

## 2023-08-14 ENCOUNTER — OFFICE VISIT (OUTPATIENT)
Dept: CARDIOLOGY CLINIC | Age: 73
End: 2023-08-14
Payer: MEDICARE

## 2023-08-14 VITALS
OXYGEN SATURATION: 91 % | HEIGHT: 71 IN | WEIGHT: 315 LBS | DIASTOLIC BLOOD PRESSURE: 64 MMHG | HEART RATE: 78 BPM | SYSTOLIC BLOOD PRESSURE: 120 MMHG | BODY MASS INDEX: 44.1 KG/M2

## 2023-08-14 DIAGNOSIS — Z87.891 HISTORY OF TOBACCO ABUSE: ICD-10-CM

## 2023-08-14 DIAGNOSIS — I10 PRIMARY HYPERTENSION: ICD-10-CM

## 2023-08-14 DIAGNOSIS — R53.83 OTHER FATIGUE: ICD-10-CM

## 2023-08-14 DIAGNOSIS — I50.9 CONGESTIVE HEART FAILURE, NYHA CLASS 4, UNSPECIFIED CONGESTIVE HEART FAILURE TYPE (HCC): ICD-10-CM

## 2023-08-14 DIAGNOSIS — I25.10 CORONARY ARTERY DISEASE INVOLVING NATIVE CORONARY ARTERY OF NATIVE HEART WITHOUT ANGINA PECTORIS: ICD-10-CM

## 2023-08-14 DIAGNOSIS — I48.19 PERSISTENT ATRIAL FIBRILLATION (HCC): Primary | ICD-10-CM

## 2023-08-14 DIAGNOSIS — E78.2 MIXED HYPERLIPIDEMIA: ICD-10-CM

## 2023-08-14 DIAGNOSIS — I25.5 ISCHEMIC CARDIOMYOPATHY: ICD-10-CM

## 2023-08-14 DIAGNOSIS — G47.33 OSA (OBSTRUCTIVE SLEEP APNEA): ICD-10-CM

## 2023-08-14 PROCEDURE — 3017F COLORECTAL CA SCREEN DOC REV: CPT | Performed by: INTERNAL MEDICINE

## 2023-08-14 PROCEDURE — 93000 ELECTROCARDIOGRAM COMPLETE: CPT | Performed by: INTERNAL MEDICINE

## 2023-08-14 PROCEDURE — 1036F TOBACCO NON-USER: CPT | Performed by: INTERNAL MEDICINE

## 2023-08-14 PROCEDURE — G8417 CALC BMI ABV UP PARAM F/U: HCPCS | Performed by: INTERNAL MEDICINE

## 2023-08-14 PROCEDURE — 3078F DIAST BP <80 MM HG: CPT | Performed by: INTERNAL MEDICINE

## 2023-08-14 PROCEDURE — 99214 OFFICE O/P EST MOD 30 MIN: CPT | Performed by: INTERNAL MEDICINE

## 2023-08-14 PROCEDURE — G8427 DOCREV CUR MEDS BY ELIG CLIN: HCPCS | Performed by: INTERNAL MEDICINE

## 2023-08-14 PROCEDURE — 3074F SYST BP LT 130 MM HG: CPT | Performed by: INTERNAL MEDICINE

## 2023-08-14 PROCEDURE — 1123F ACP DISCUSS/DSCN MKR DOCD: CPT | Performed by: INTERNAL MEDICINE

## 2023-08-14 NOTE — PATIENT INSTRUCTIONS
Plan:  Current medications reviewed. Refills given as warranted. Continue using metolazone as needed. Continue following with pcp for your yearly labs  -call and let us know when you have yearly labs and I will try to get a copy of them  -blood work recommended  -CBC, CMP, TSH, Lipids   -you will need to be fasting for blood work  -it is ok to take your medicine with sips of water or black coffee  Goal LDL is less than 70 for your cholesterol  No additional cardiac testing at this time. Follow up with me in 6 months, call in December to make your next appointment.

## 2023-08-14 NOTE — PROGRESS NOTES
[address] [facility]   Cardiac Followup    Referring Provider:  Nury Ly, LARISSA - CNP     Chief Complaint   Patient presents with    Follow-up    Hypertension    Congestive Heart Failure    Atrial Fibrillation    Hyperlipidemia    Coronary Artery Disease    Cardiomyopathy          Subjective: Mr Chelsea Mims presents today for cardiology follow up; c/o chronic fatigue    History of Present Illness:   Mr. Chelsea Mims is a 72 y.o. male who has PMH HTN, HLD, s/p back surgery 12/12, PVD s/p right fem-pop bypass surgery 10/10, hx CAD s/p 2V CABG in 2010 (SOB anginal equivalent), mild-mod ICM EF=35-40%,  hx MI, severe COPD, severe CHRISTOFER on Bipap, CRI, DM, and hx Covid 2022. He wears 3L O2 qd and uses Bipap qhs with 5L NC oxygen. (nephrologist, Dr. Dominik Maynard, in [facility]). Prolonged admit 2013 [facility] for Klebsiella PNA with complications including PEA cardiac arrest and tracheostomy temporarily. Also had 2 DVT LLE and coumadin temporarily. EKG 3/3/22 NSR; 1st AVB; NST change (no change 3/21). ECHO 3/26/21 EF=35-40% at OSH (EF=40% in 8/17 and 55% in 10/12). [place] nuc 2/21/22 Small-moderate sized apical septal ischemia and infarction; LVEF=45%. LHC 3/3/22 Severe native 2V CAD; Patent LIMA to LAD. Patent SVG to RCA. Wedge 20, RA 15; normal CO and CI  Plan: Recommend management with GDMT; no PCI. No change from 10/14 study. Note renal Dr. Dominik Maynard d/c'd aldactone and told to use metolazone PRN. Not on SGLT2i due to CRI with low GFR. Carotid Doppler 9/3/22 <50% stenosis bilaterally (no change 10/12). Today, his daughter is present. He presents in a wheelchair. He reports he wears 3L during day and 5L at night with BiPAP. His BiPAP has been broken for a couple of weeks. He just got his BiPAP back. He uses metolazone 1-2 times a week. He is tired all the time. He reports his iron was stopped because it is too high. Patient denies current edema, chest pain, palpitations, dizziness or syncope.   Patient is taking all

## 2023-08-14 NOTE — PROGRESS NOTES
[facility]   Cardiac Followup    Referring Provider:  Mike Mon, APRN - CNP     No chief complaint on file. Subjective: Mr Chino Zaidi presents today for cardiology follow up; no complaints today    History of Present Illness:   Mr. Chino Zaidi is a 72 y.o. male who has PMH HTN, HLD, s/p back surgery 12/12, PVD s/p right fem-pop bypass surgery 10/10, hx CAD s/p 2V CABG in 2010 (SOB anginal equivalent), mild-mod ICM EF=40%,  hx MI, severe COPD, severe CHRISTOFER on Bipap, CRI, DM, and hx Covid 2022. He wears 3L O2 qd and uses Bipap qhs with 5L NC oxygen. (nephrologist, Dr. Lilian Hand, in [facility]). Prolonged admit 2013 [facility] for Klebsiella PNA with complications including PEA cardiac arrest and tracheostomy temporarily. Also had 2 DVT LLE and coumadin temporarily. EKG 3/3/22 NSR; 1st AVB; NST change (no change 3/21). ECHO 3/26/21 EF=35-40% at [facility] (EF=40% in 8/17 and 55% in 10/12). Poonam nuc 2/21/22 Small-moderate sized apical septal ischemia and infarction; LVEF=45%. [facility] 3/3/22 Severe native 2V CAD; Patent LIMA to LAD. Patent SVG to RCA. Wedge 20, RA 15; normal CO and CI  Plan: Recommend management with GDMT; no PCI. No change from 10/14 study. Note renal Dr. Lilian Hand d/c'd aldactone and told him to use metolazone only PRN. Not on SGLT2i due to CRI with low GFR. Carotid Doppler 9/3/22 <50% stenosis bilaterally (no change 10/12). Today, presents     Weight today is ***# (Up ***# from 2/13/23)245      Past Medical History:   has a past medical history of CAD (coronary artery disease), CHF (congestive heart failure) ([address]), Chronic kidney disease, COPD (chronic obstructive pulmonary disease) ([address]), Diabetes mellitus ([address]), DVT of leg (deep venous thrombosis) ([address]), History of intravascular stent placement, Hyperlipidemia, Hypertension, MRSA infection within last 3 months, Pneumonia, Syringopleural Shunt Placement, and Unspecified diseases of blood and blood-forming organs.     Surgical

## 2023-08-16 ENCOUNTER — TELEPHONE (OUTPATIENT)
Dept: CARDIOLOGY CLINIC | Age: 73
End: 2023-08-16

## 2023-08-16 DIAGNOSIS — Z79.899 MEDICATION MANAGEMENT: Primary | ICD-10-CM

## 2023-08-17 NOTE — TELEPHONE ENCOUNTER
Spoke to Tamiko, at [facility] lab who stated that she would fax over pt's labs. Awaiting results now.

## 2023-08-18 NOTE — TELEPHONE ENCOUNTER
Called and spoke with medical records. They have CMP and CBC from 8/9/23. No recent lipid or TSH. They will fax results today.

## 2023-08-22 NOTE — TELEPHONE ENCOUNTER
Katelyn Jamison MD  [facility] Staff 4 hours ago (3:15 AM)     RG  Please order fasting lipids and TSH   prefer [facility] lab  can go to [place] on [address] they can do blood work there may be closer to his home

## 2023-08-22 NOTE — TELEPHONE ENCOUNTER
Spoke to pt, relayed [facility] message. Pt v/u. Labs ordered in [facility]. Pt stated he has an appointment here in [facility] tomorrow and would have lab work completed at that time.

## 2023-08-23 ENCOUNTER — OFFICE VISIT (OUTPATIENT)
Dept: PULMONOLOGY | Age: 73
End: 2023-08-23
Payer: MEDICARE

## 2023-08-23 ENCOUNTER — HOSPITAL ENCOUNTER (OUTPATIENT)
Age: 73
Discharge: HOME | End: 2023-08-23
Payer: MEDICARE

## 2023-08-23 VITALS
SYSTOLIC BLOOD PRESSURE: 140 MMHG | HEIGHT: 71 IN | DIASTOLIC BLOOD PRESSURE: 74 MMHG | OXYGEN SATURATION: 95 % | WEIGHT: 315 LBS | BODY MASS INDEX: 44.1 KG/M2 | HEART RATE: 96 BPM | RESPIRATION RATE: 18 BRPM

## 2023-08-23 DIAGNOSIS — J44.9 COPD, SEVERE (HCC): Primary | ICD-10-CM

## 2023-08-23 DIAGNOSIS — G47.33 OSA (OBSTRUCTIVE SLEEP APNEA): ICD-10-CM

## 2023-08-23 DIAGNOSIS — Z79.899 MEDICATION MANAGEMENT: ICD-10-CM

## 2023-08-23 DIAGNOSIS — J96.11 CHRONIC HYPOXEMIC RESPIRATORY FAILURE (HCC): ICD-10-CM

## 2023-08-23 DIAGNOSIS — I27.81 COR PULMONALE (CHRONIC) (HCC): ICD-10-CM

## 2023-08-23 LAB
CHOLEST SERPL-MCNC: 126 MG/DL (ref 0–199)
HDLC SERPL-MCNC: 34 MG/DL (ref 40–60)
LDL CHOLESTEROL CALCULATED: 53 MG/DL
T4 FREE SERPL-MCNC: 1.3 NG/DL (ref 0.9–1.8)
TRIGL SERPL-MCNC: 196 MG/DL (ref 0–150)
TSH SERPL DL<=0.005 MIU/L-ACNC: 5.26 UIU/ML (ref 0.27–4.2)
VLDLC SERPL CALC-MCNC: 39 MG/DL

## 2023-08-23 PROCEDURE — G8427 DOCREV CUR MEDS BY ELIG CLIN: HCPCS | Performed by: INTERNAL MEDICINE

## 2023-08-23 PROCEDURE — 99214 OFFICE O/P EST MOD 30 MIN: CPT | Performed by: INTERNAL MEDICINE

## 2023-08-23 PROCEDURE — 80061 LIPID PANEL: CPT

## 2023-08-23 PROCEDURE — 36415 COLL VENOUS BLD VENIPUNCTURE: CPT

## 2023-08-23 PROCEDURE — 3023F SPIROM DOC REV: CPT | Performed by: INTERNAL MEDICINE

## 2023-08-23 PROCEDURE — 1123F ACP DISCUSS/DSCN MKR DOCD: CPT | Performed by: INTERNAL MEDICINE

## 2023-08-23 PROCEDURE — 3077F SYST BP >= 140 MM HG: CPT | Performed by: INTERNAL MEDICINE

## 2023-08-23 PROCEDURE — 3078F DIAST BP <80 MM HG: CPT | Performed by: INTERNAL MEDICINE

## 2023-08-23 PROCEDURE — 3017F COLORECTAL CA SCREEN DOC REV: CPT | Performed by: INTERNAL MEDICINE

## 2023-08-23 PROCEDURE — 84439 ASSAY OF FREE THYROXINE: CPT

## 2023-08-23 PROCEDURE — 1036F TOBACCO NON-USER: CPT | Performed by: INTERNAL MEDICINE

## 2023-08-23 PROCEDURE — G8417 CALC BMI ABV UP PARAM F/U: HCPCS | Performed by: INTERNAL MEDICINE

## 2023-08-23 PROCEDURE — 84443 ASSAY THYROID STIM HORMONE: CPT

## 2023-08-23 RX ORDER — ZINC GLUCONATE 50 MG
50 TABLET ORAL DAILY
COMMUNITY

## 2023-08-23 RX ORDER — GLIPIZIDE 10 MG/1
2 TABLET ORAL 2 TIMES DAILY
COMMUNITY
Start: 2023-07-28

## 2023-08-23 RX ORDER — BUDESONIDE AND FORMOTEROL FUMARATE DIHYDRATE 160; 4.5 UG/1; UG/1
2 AEROSOL RESPIRATORY (INHALATION) 2 TIMES DAILY
Qty: 10.2 G | Refills: 5 | Status: SHIPPED | OUTPATIENT
Start: 2023-08-23

## 2023-08-23 RX ORDER — IPRATROPIUM BROMIDE AND ALBUTEROL SULFATE 2.5; .5 MG/3ML; MG/3ML
1 SOLUTION RESPIRATORY (INHALATION) EVERY 6 HOURS PRN
Qty: 120 ML | Refills: 5 | Status: SHIPPED | OUTPATIENT
Start: 2023-08-23

## 2023-08-23 RX ORDER — MULTIVITAMIN WITH IRON
1 TABLET ORAL DAILY
COMMUNITY

## 2023-08-23 RX ORDER — VILAZODONE HYDROCHLORIDE 40 MG/1
2 TABLET ORAL 2 TIMES DAILY
COMMUNITY

## 2023-08-23 ASSESSMENT — SLEEP AND FATIGUE QUESTIONNAIRES
HOW LIKELY ARE YOU TO NOD OFF OR FALL ASLEEP WHILE WATCHING TV: 1
HOW LIKELY ARE YOU TO NOD OFF OR FALL ASLEEP IN A CAR, WHILE STOPPED FOR A FEW MINUTES IN TRAFFIC: 0
HOW LIKELY ARE YOU TO NOD OFF OR FALL ASLEEP WHILE SITTING QUIETLY AFTER LUNCH WITHOUT ALCOHOL: 1
HOW LIKELY ARE YOU TO NOD OFF OR FALL ASLEEP WHILE SITTING AND READING: 0
ESS TOTAL SCORE: 3
HOW LIKELY ARE YOU TO NOD OFF OR FALL ASLEEP WHEN YOU ARE A PASSENGER IN A CAR FOR AN HOUR WITHOUT A BREAK: 0
HOW LIKELY ARE YOU TO NOD OFF OR FALL ASLEEP WHILE LYING DOWN TO REST IN THE AFTERNOON WHEN CIRCUMSTANCES PERMIT: 0
HOW LIKELY ARE YOU TO NOD OFF OR FALL ASLEEP WHILE SITTING INACTIVE IN A PUBLIC PLACE: 0
NECK CIRCUMFERENCE (INCHES): 21
HOW LIKELY ARE YOU TO NOD OFF OR FALL ASLEEP WHILE SITTING AND TALKING TO SOMEONE: 1

## 2023-08-23 NOTE — PROGRESS NOTES
[facility]      CHIEF COMPLAINT: COPD/CT       HPI:   Doing okay   Some cough with clear white thick sputum  No hemoptysis   Cough with white sputum  No hemoptysis   Uses Neb 2-3 times/day   Not compliant with his BiPAP- starting again today after he fixed the humidifier. Past Medical History:   Diagnosis Date    CAD (coronary artery disease)     MI    CHF (congestive heart failure) (HCC)     Chronic kidney disease     COPD (chronic obstructive pulmonary disease) ([address])     Diabetes mellitus ([address])     DVT of leg (deep venous thrombosis) ([address])     History of intravascular stent placement 03/16/2016    [place] EverFlex Stent(s) placed in left SFA    Hyperlipidemia     Hypertension     MRSA infection within last 3 months     Pneumonia     Syringopleural Shunt Placement 12/31/2012    Tran Garcia MD/ [facility]/ placed T-7/8 Syrinx to pleural placement    Unspecified diseases of blood and blood-forming organs        Past Surgical History:        Procedure Laterality Date    ANKLE FRACTURE SURGERY Left 2/22/15    ORIF; LEFT ANKLE OPEN REDUCTION INTERNAL FIXATION    BACK SURGERY      CARDIAC SURGERY      2 VESSEL CABG    CORONARY ARTERY BYPASS GRAFT      DIAGNOSTIC CARDIAC CATH LAB PROCEDURE      FRACTURE SURGERY      LEG SURGERY      VEIN REMOVED FROM L  LEG  PLACED IN RT LEG    LEG SURGERY  2016    [facility]    UPPER GASTROINTESTINAL ENDOSCOPY N/A 5/17/2023    ESOPHAGOGASTRODUODENOSCOPY performed by Pradeep Quarles MD at [address] [place] [address] 7/19/2023    EGD BIOPSY performed by Pradeep Quarles MD at [facility] ENDOSCOPY       Allergies:  has No Known Allergies. Social History:    TOBACCO:   reports that he quit smoking about 13 years ago. His smoking use included cigarettes. He has a 80.00 pack-year smoking history. He quit smokeless tobacco use about 11 years ago.   His

## 2023-08-24 ENCOUNTER — TELEPHONE (OUTPATIENT)
Dept: CARDIOLOGY CLINIC | Age: 73
End: 2023-08-24

## 2023-08-24 NOTE — TELEPHONE ENCOUNTER
----- Message from Yolanda Oliver MD sent at 8/24/2023  9:14 AM EDT -----  Blood test for thyroid and cholesterol are ok.

## 2023-09-11 ENCOUNTER — TELEPHONE (OUTPATIENT)
Dept: CARDIOLOGY CLINIC | Age: 73
End: 2023-09-11

## 2023-09-11 LAB
ALBUMIN SERPL-MCNC: 3.4 G/DL
ALP BLD-CCNC: 83 U/L
ALT SERPL-CCNC: 17 U/L
ANION GAP SERPL CALCULATED.3IONS-SCNC: 5 MMOL/L
AST SERPL-CCNC: 12 U/L
BASOPHILS ABSOLUTE: 0 /ΜL
BASOPHILS RELATIVE PERCENT: 0.4 %
BILIRUB SERPL-MCNC: 0.3 MG/DL (ref 0.1–1.4)
BUN BLDV-MCNC: 43 MG/DL
CALCIUM SERPL-MCNC: 9 MG/DL
CHLORIDE BLD-SCNC: 98 MMOL/L
CO2: 39.5 MMOL/L
CREAT SERPL-MCNC: 2.14 MG/DL
EGFR: 37
EOSINOPHILS ABSOLUTE: 0.5 /ΜL
EOSINOPHILS RELATIVE PERCENT: 5.5 %
GLUCOSE BLD-MCNC: 114 MG/DL
HCT VFR BLD CALC: 30.3 % (ref 41–53)
HEMOGLOBIN: 9.8 G/DL (ref 13.5–17.5)
LYMPHOCYTES ABSOLUTE: 1.4 /ΜL
LYMPHOCYTES RELATIVE PERCENT: 14 %
MCH RBC QN AUTO: 30.7 PG
MCHC RBC AUTO-ENTMCNC: 32.5 G/DL
MCV RBC AUTO: 9.5 FL
MONOCYTES ABSOLUTE: 1 /ΜL
MONOCYTES RELATIVE PERCENT: 9.8 %
NEUTROPHILS ABSOLUTE: 7 /ΜL
NEUTROPHILS RELATIVE PERCENT: ABNORMAL
PLATELET # BLD: 173 K/ΜL
PMV BLD AUTO: ABNORMAL FL
POTASSIUM SERPL-SCNC: 5.1 MMOL/L
RBC # BLD: 3.21 10^6/ΜL
SODIUM BLD-SCNC: 142 MMOL/L
TOTAL PROTEIN: 6.5
WBC # BLD: 9.9 10^3/ML

## 2023-09-11 NOTE — TELEPHONE ENCOUNTER
----- Message from Nury Quinteros MD sent at 9/11/2023 11:13 AM EDT -----  Stable labs overall but renal function mildly worsened. Make sure he discusses with his kidney doctor, Dr. Nik Mcdowell, in [facility] to see if she wants to do anything or just follow.

## (undated) DEVICE — MASK O2 ADULT POM PROCEDURAL OXYGEN MASK 7FT O2 TUBING 10FT

## (undated) DEVICE — Z DUP USE 2149365 FORCEPS BX L240CM JAW DIA2.8MM L CAP W/ NDL MIC MESH TOOTH